# Patient Record
Sex: FEMALE | Race: WHITE | Employment: OTHER | URBAN - METROPOLITAN AREA
[De-identification: names, ages, dates, MRNs, and addresses within clinical notes are randomized per-mention and may not be internally consistent; named-entity substitution may affect disease eponyms.]

---

## 2020-08-20 ENCOUNTER — APPOINTMENT (OUTPATIENT)
Dept: GENERAL RADIOLOGY | Age: 70
DRG: 683 | End: 2020-08-20
Payer: COMMERCIAL

## 2020-08-20 ENCOUNTER — HOSPITAL ENCOUNTER (INPATIENT)
Age: 70
LOS: 4 days | Discharge: NURSING, REHAB OR LONG-TERM CARE | DRG: 683 | End: 2020-08-24
Attending: EMERGENCY MEDICINE | Admitting: INTERNAL MEDICINE
Payer: COMMERCIAL

## 2020-08-20 PROBLEM — E11.9 TYPE 2 DIABETES MELLITUS, WITH LONG-TERM CURRENT USE OF INSULIN (HCC): Chronic | Status: ACTIVE | Noted: 2020-08-20

## 2020-08-20 PROBLEM — Z79.4 TYPE 2 DIABETES MELLITUS, WITH LONG-TERM CURRENT USE OF INSULIN (HCC): Chronic | Status: ACTIVE | Noted: 2020-08-20

## 2020-08-20 PROBLEM — I48.91 ATRIAL FIBRILLATION WITH SLOW VENTRICULAR RESPONSE (HCC): Status: ACTIVE | Noted: 2020-08-20

## 2020-08-20 PROBLEM — N18.9 ACUTE KIDNEY INJURY SUPERIMPOSED ON CHRONIC KIDNEY DISEASE (HCC): Chronic | Status: ACTIVE | Noted: 2020-08-20

## 2020-08-20 PROBLEM — E03.9 ACQUIRED HYPOTHYROIDISM: Chronic | Status: ACTIVE | Noted: 2020-08-20

## 2020-08-20 PROBLEM — N18.30 STAGE 3 CHRONIC KIDNEY DISEASE (HCC): Chronic | Status: ACTIVE | Noted: 2020-08-20

## 2020-08-20 PROBLEM — N17.9 AKI (ACUTE KIDNEY INJURY) (HCC): Status: ACTIVE | Noted: 2020-08-20

## 2020-08-20 PROBLEM — N17.9 ACUTE KIDNEY INJURY SUPERIMPOSED ON CHRONIC KIDNEY DISEASE (HCC): Chronic | Status: ACTIVE | Noted: 2020-08-20

## 2020-08-20 PROBLEM — I10 ESSENTIAL HYPERTENSION: Chronic | Status: ACTIVE | Noted: 2020-08-20

## 2020-08-20 PROBLEM — F03.90 DEMENTIA (HCC): Chronic | Status: ACTIVE | Noted: 2020-08-20

## 2020-08-20 PROBLEM — K21.9 GERD (GASTROESOPHAGEAL REFLUX DISEASE): Chronic | Status: ACTIVE | Noted: 2020-08-20

## 2020-08-20 PROBLEM — E87.5 HYPERKALEMIA: Status: ACTIVE | Noted: 2020-08-20

## 2020-08-20 LAB
ALBUMIN SERPL-MCNC: 3.6 G/DL (ref 3.4–5)
ALP BLD-CCNC: 101 U/L (ref 40–129)
ALT SERPL-CCNC: 32 U/L (ref 10–40)
ANION GAP SERPL CALCULATED.3IONS-SCNC: 10 MMOL/L (ref 3–16)
AST SERPL-CCNC: 92 U/L (ref 15–37)
BACTERIA: ABNORMAL /HPF
BASOPHILS ABSOLUTE: 0 K/UL (ref 0–0.2)
BASOPHILS RELATIVE PERCENT: 0.6 %
BILIRUB SERPL-MCNC: 0.6 MG/DL (ref 0–1)
BILIRUBIN DIRECT: <0.2 MG/DL (ref 0–0.3)
BILIRUBIN URINE: ABNORMAL
BILIRUBIN, INDIRECT: ABNORMAL MG/DL (ref 0–1)
BLOOD, URINE: NEGATIVE
BUN BLDV-MCNC: 94 MG/DL (ref 7–20)
CALCIUM SERPL-MCNC: 9.8 MG/DL (ref 8.3–10.6)
CHLORIDE BLD-SCNC: 109 MMOL/L (ref 99–110)
CLARITY: CLEAR
CO2: 13 MMOL/L (ref 21–32)
COLOR: YELLOW
CREAT SERPL-MCNC: 2.6 MG/DL (ref 0.6–1.2)
EOSINOPHILS ABSOLUTE: 0.1 K/UL (ref 0–0.6)
EOSINOPHILS RELATIVE PERCENT: 1.7 %
EPITHELIAL CELLS, UA: ABNORMAL /HPF (ref 0–5)
GFR AFRICAN AMERICAN: 22
GFR NON-AFRICAN AMERICAN: 18
GLUCOSE BLD-MCNC: 161 MG/DL (ref 70–99)
GLUCOSE URINE: NEGATIVE MG/DL
HCT VFR BLD CALC: 33.4 % (ref 36–48)
HEMOGLOBIN: 10.9 G/DL (ref 12–16)
INR BLD: 1.79 (ref 0.86–1.14)
KETONES, URINE: NEGATIVE MG/DL
LEUKOCYTE ESTERASE, URINE: ABNORMAL
LYMPHOCYTES ABSOLUTE: 0.8 K/UL (ref 1–5.1)
LYMPHOCYTES RELATIVE PERCENT: 13.4 %
MAGNESIUM: 1.8 MG/DL (ref 1.8–2.4)
MCH RBC QN AUTO: 30.7 PG (ref 26–34)
MCHC RBC AUTO-ENTMCNC: 32.7 G/DL (ref 31–36)
MCV RBC AUTO: 93.8 FL (ref 80–100)
MICROSCOPIC EXAMINATION: YES
MONOCYTES ABSOLUTE: 0.5 K/UL (ref 0–1.3)
MONOCYTES RELATIVE PERCENT: 9.1 %
NEUTROPHILS ABSOLUTE: 4.4 K/UL (ref 1.7–7.7)
NEUTROPHILS RELATIVE PERCENT: 75.2 %
NITRITE, URINE: NEGATIVE
PDW BLD-RTO: 14.2 % (ref 12.4–15.4)
PH UA: 5.5 (ref 5–8)
PLATELET # BLD: 106 K/UL (ref 135–450)
PMV BLD AUTO: 8.5 FL (ref 5–10.5)
POTASSIUM REFLEX MAGNESIUM: 6.8 MMOL/L (ref 3.5–5.1)
PRO-BNP: 1620 PG/ML (ref 0–124)
PROTEIN UA: NEGATIVE MG/DL
PROTHROMBIN TIME: 20.9 SEC (ref 10–13.2)
RBC # BLD: 3.56 M/UL (ref 4–5.2)
RBC UA: ABNORMAL /HPF (ref 0–4)
S PYO AG THROAT QL: NEGATIVE
SODIUM BLD-SCNC: 132 MMOL/L (ref 136–145)
SPECIFIC GRAVITY UA: 1.02 (ref 1–1.03)
TOTAL PROTEIN: 6.7 G/DL (ref 6.4–8.2)
URINE REFLEX TO CULTURE: YES
URINE TYPE: ABNORMAL
UROBILINOGEN, URINE: 0.2 E.U./DL
WBC # BLD: 5.8 K/UL (ref 4–11)
WBC UA: ABNORMAL /HPF (ref 0–5)

## 2020-08-20 PROCEDURE — 85610 PROTHROMBIN TIME: CPT

## 2020-08-20 PROCEDURE — 87086 URINE CULTURE/COLONY COUNT: CPT

## 2020-08-20 PROCEDURE — 2500000003 HC RX 250 WO HCPCS: Performed by: NURSE PRACTITIONER

## 2020-08-20 PROCEDURE — 6370000000 HC RX 637 (ALT 250 FOR IP): Performed by: NURSE PRACTITIONER

## 2020-08-20 PROCEDURE — 36415 COLL VENOUS BLD VENIPUNCTURE: CPT

## 2020-08-20 PROCEDURE — 2580000003 HC RX 258: Performed by: NURSE PRACTITIONER

## 2020-08-20 PROCEDURE — 6360000002 HC RX W HCPCS: Performed by: INTERNAL MEDICINE

## 2020-08-20 PROCEDURE — 2500000003 HC RX 250 WO HCPCS: Performed by: INTERNAL MEDICINE

## 2020-08-20 PROCEDURE — 80076 HEPATIC FUNCTION PANEL: CPT

## 2020-08-20 PROCEDURE — 81001 URINALYSIS AUTO W/SCOPE: CPT

## 2020-08-20 PROCEDURE — 96375 TX/PRO/DX INJ NEW DRUG ADDON: CPT

## 2020-08-20 PROCEDURE — 80048 BASIC METABOLIC PNL TOTAL CA: CPT

## 2020-08-20 PROCEDURE — 99285 EMERGENCY DEPT VISIT HI MDM: CPT

## 2020-08-20 PROCEDURE — 87880 STREP A ASSAY W/OPTIC: CPT

## 2020-08-20 PROCEDURE — 71045 X-RAY EXAM CHEST 1 VIEW: CPT

## 2020-08-20 PROCEDURE — 87081 CULTURE SCREEN ONLY: CPT

## 2020-08-20 PROCEDURE — 83880 ASSAY OF NATRIURETIC PEPTIDE: CPT

## 2020-08-20 PROCEDURE — 96374 THER/PROPH/DIAG INJ IV PUSH: CPT

## 2020-08-20 PROCEDURE — 6360000002 HC RX W HCPCS: Performed by: NURSE PRACTITIONER

## 2020-08-20 PROCEDURE — 2580000003 HC RX 258: Performed by: INTERNAL MEDICINE

## 2020-08-20 PROCEDURE — 83735 ASSAY OF MAGNESIUM: CPT

## 2020-08-20 PROCEDURE — 1200000000 HC SEMI PRIVATE

## 2020-08-20 PROCEDURE — 85025 COMPLETE CBC W/AUTO DIFF WBC: CPT

## 2020-08-20 PROCEDURE — 93005 ELECTROCARDIOGRAM TRACING: CPT | Performed by: EMERGENCY MEDICINE

## 2020-08-20 RX ORDER — ATORVASTATIN CALCIUM 10 MG/1
20 TABLET, FILM COATED ORAL NIGHTLY
Status: DISCONTINUED | OUTPATIENT
Start: 2020-08-20 | End: 2020-08-24 | Stop reason: HOSPADM

## 2020-08-20 RX ORDER — PROMETHAZINE HYDROCHLORIDE 25 MG/1
12.5 TABLET ORAL EVERY 6 HOURS PRN
Status: DISCONTINUED | OUTPATIENT
Start: 2020-08-20 | End: 2020-08-24 | Stop reason: HOSPADM

## 2020-08-20 RX ORDER — ACETAMINOPHEN 650 MG/1
650 SUPPOSITORY RECTAL EVERY 6 HOURS PRN
Status: DISCONTINUED | OUTPATIENT
Start: 2020-08-20 | End: 2020-08-24 | Stop reason: HOSPADM

## 2020-08-20 RX ORDER — DEXTROSE MONOHYDRATE 50 MG/ML
100 INJECTION, SOLUTION INTRAVENOUS PRN
Status: DISCONTINUED | OUTPATIENT
Start: 2020-08-20 | End: 2020-08-24 | Stop reason: HOSPADM

## 2020-08-20 RX ORDER — SODIUM PHOSPHATE, DIBASIC AND SODIUM PHOSPHATE, MONOBASIC 7; 19 G/133ML; G/133ML
1 ENEMA RECTAL DAILY PRN
COMMUNITY

## 2020-08-20 RX ORDER — ONDANSETRON 2 MG/ML
4 INJECTION INTRAMUSCULAR; INTRAVENOUS EVERY 6 HOURS PRN
Status: DISCONTINUED | OUTPATIENT
Start: 2020-08-20 | End: 2020-08-24 | Stop reason: HOSPADM

## 2020-08-20 RX ORDER — POLYETHYLENE GLYCOL 3350 17 G/17G
17 POWDER, FOR SOLUTION ORAL DAILY PRN
COMMUNITY

## 2020-08-20 RX ORDER — DEXTROSE MONOHYDRATE 25 G/50ML
25 INJECTION, SOLUTION INTRAVENOUS ONCE
Status: COMPLETED | OUTPATIENT
Start: 2020-08-20 | End: 2020-08-20

## 2020-08-20 RX ORDER — SERTRALINE HYDROCHLORIDE 100 MG/1
100 TABLET, FILM COATED ORAL NIGHTLY
COMMUNITY

## 2020-08-20 RX ORDER — SODIUM POLYSTYRENE SULFONATE 15 G/60ML
30 SUSPENSION ORAL; RECTAL ONCE
Status: COMPLETED | OUTPATIENT
Start: 2020-08-20 | End: 2020-08-21

## 2020-08-20 RX ORDER — LEVOTHYROXINE SODIUM 0.12 MG/1
125 TABLET ORAL DAILY
Status: DISCONTINUED | OUTPATIENT
Start: 2020-08-21 | End: 2020-08-24 | Stop reason: HOSPADM

## 2020-08-20 RX ORDER — FUROSEMIDE 20 MG/1
20 TABLET ORAL 2 TIMES DAILY
Status: ON HOLD | COMMUNITY
End: 2020-12-24 | Stop reason: HOSPADM

## 2020-08-20 RX ORDER — DIPHENHYDRAMINE HCL 25 MG
25 CAPSULE ORAL EVERY 6 HOURS PRN
COMMUNITY

## 2020-08-20 RX ORDER — ATORVASTATIN CALCIUM 20 MG/1
20 TABLET, FILM COATED ORAL DAILY
COMMUNITY
End: 2022-04-20

## 2020-08-20 RX ORDER — CALCIUM CARBONATE 200(500)MG
1000 TABLET,CHEWABLE ORAL 3 TIMES DAILY PRN
Status: DISCONTINUED | OUTPATIENT
Start: 2020-08-20 | End: 2020-08-24 | Stop reason: HOSPADM

## 2020-08-20 RX ORDER — MECLIZINE HYDROCHLORIDE 25 MG/1
25 TABLET ORAL 2 TIMES DAILY
COMMUNITY
End: 2022-04-20

## 2020-08-20 RX ORDER — DULAGLUTIDE 1.5 MG/.5ML
1.5 INJECTION, SOLUTION SUBCUTANEOUS WEEKLY
COMMUNITY

## 2020-08-20 RX ORDER — FLUTICASONE PROPIONATE 50 MCG
1 SPRAY, SUSPENSION (ML) NASAL DAILY
COMMUNITY

## 2020-08-20 RX ORDER — DEXTROSE MONOHYDRATE 25 G/50ML
12.5 INJECTION, SOLUTION INTRAVENOUS PRN
Status: DISCONTINUED | OUTPATIENT
Start: 2020-08-20 | End: 2020-08-24 | Stop reason: HOSPADM

## 2020-08-20 RX ORDER — ONDANSETRON 4 MG/1
4 TABLET, FILM COATED ORAL EVERY 6 HOURS PRN
COMMUNITY

## 2020-08-20 RX ORDER — NICOTINE POLACRILEX 4 MG
15 LOZENGE BUCCAL PRN
Status: DISCONTINUED | OUTPATIENT
Start: 2020-08-20 | End: 2020-08-24 | Stop reason: HOSPADM

## 2020-08-20 RX ORDER — LEVOTHYROXINE SODIUM 0.12 MG/1
150 TABLET ORAL DAILY
COMMUNITY

## 2020-08-20 RX ORDER — ACETAMINOPHEN 325 MG/1
650 TABLET ORAL EVERY 6 HOURS PRN
Status: DISCONTINUED | OUTPATIENT
Start: 2020-08-20 | End: 2020-08-24 | Stop reason: HOSPADM

## 2020-08-20 RX ORDER — CALCIUM GLUCONATE 20 MG/ML
1 INJECTION, SOLUTION INTRAVENOUS ONCE
Status: COMPLETED | OUTPATIENT
Start: 2020-08-20 | End: 2020-08-20

## 2020-08-20 RX ORDER — AMOXICILLIN 250 MG
1 CAPSULE ORAL DAILY
COMMUNITY
End: 2022-04-20

## 2020-08-20 RX ORDER — ATROPINE SULFATE 0.1 MG/ML
0.5 INJECTION INTRAVENOUS EVERY 30 MIN PRN
Status: DISCONTINUED | OUTPATIENT
Start: 2020-08-20 | End: 2020-08-24 | Stop reason: HOSPADM

## 2020-08-20 RX ORDER — INSULIN GLARGINE 100 [IU]/ML
0.15 INJECTION, SOLUTION SUBCUTANEOUS NIGHTLY
Status: DISCONTINUED | OUTPATIENT
Start: 2020-08-20 | End: 2020-08-24 | Stop reason: HOSPADM

## 2020-08-20 RX ORDER — TIZANIDINE 4 MG/1
4 TABLET ORAL EVERY 6 HOURS PRN
Status: ON HOLD | COMMUNITY
End: 2020-12-24 | Stop reason: HOSPADM

## 2020-08-20 RX ORDER — ATROPINE SULFATE 0.1 MG/ML
0.5 INJECTION INTRAVENOUS ONCE
Status: COMPLETED | OUTPATIENT
Start: 2020-08-20 | End: 2020-08-20

## 2020-08-20 RX ORDER — INSULIN GLARGINE 100 [IU]/ML
16-24 INJECTION, SOLUTION SUBCUTANEOUS 2 TIMES DAILY
COMMUNITY

## 2020-08-20 RX ORDER — SODIUM CHLORIDE 0.9 % (FLUSH) 0.9 %
10 SYRINGE (ML) INJECTION PRN
Status: DISCONTINUED | OUTPATIENT
Start: 2020-08-20 | End: 2020-08-24 | Stop reason: HOSPADM

## 2020-08-20 RX ORDER — PANTOPRAZOLE SODIUM 40 MG/1
40 TABLET, DELAYED RELEASE ORAL
Status: DISCONTINUED | OUTPATIENT
Start: 2020-08-21 | End: 2020-08-24 | Stop reason: HOSPADM

## 2020-08-20 RX ORDER — PANTOPRAZOLE SODIUM 40 MG/1
40 TABLET, DELAYED RELEASE ORAL 2 TIMES DAILY
Status: ON HOLD | COMMUNITY
End: 2020-12-24 | Stop reason: HOSPADM

## 2020-08-20 RX ORDER — AMLODIPINE BESYLATE 10 MG/1
10 TABLET ORAL DAILY
Status: ON HOLD | COMMUNITY
End: 2020-12-24 | Stop reason: HOSPADM

## 2020-08-20 RX ORDER — SODIUM CHLORIDE 9 MG/ML
INJECTION, SOLUTION INTRAVENOUS CONTINUOUS
Status: DISCONTINUED | OUTPATIENT
Start: 2020-08-20 | End: 2020-08-21

## 2020-08-20 RX ORDER — AMLODIPINE BESYLATE 5 MG/1
10 TABLET ORAL DAILY
Status: DISCONTINUED | OUTPATIENT
Start: 2020-08-21 | End: 2020-08-21

## 2020-08-20 RX ADMIN — SODIUM BICARBONATE 25 MEQ: 84 INJECTION INTRAVENOUS at 18:15

## 2020-08-20 RX ADMIN — CALCIUM GLUCONATE 3 G: 98 INJECTION, SOLUTION INTRAVENOUS at 20:57

## 2020-08-20 RX ADMIN — ATROPINE SULFATE 0.5 MG: 0.1 INJECTION PARENTERAL at 17:57

## 2020-08-20 RX ADMIN — CALCIUM GLUCONATE 1 G: 20 INJECTION, SOLUTION INTRAVENOUS at 18:15

## 2020-08-20 RX ADMIN — INSULIN HUMAN 10 UNITS: 100 INJECTION, SOLUTION PARENTERAL at 18:16

## 2020-08-20 RX ADMIN — SODIUM BICARBONATE 50 MEQ: 84 INJECTION INTRAVENOUS at 20:03

## 2020-08-20 RX ADMIN — SODIUM CHLORIDE: 9 INJECTION, SOLUTION INTRAVENOUS at 19:19

## 2020-08-20 RX ADMIN — DEXTROSE MONOHYDRATE 25 G: 25 INJECTION, SOLUTION INTRAVENOUS at 18:15

## 2020-08-20 SDOH — HEALTH STABILITY: MENTAL HEALTH: HOW OFTEN DO YOU HAVE A DRINK CONTAINING ALCOHOL?: NEVER

## 2020-08-20 NOTE — ED PROVIDER NOTES
I independently performed a [facility] All diagnostic, treatment, and disposition decisions were made by myself in conjunction with the advanced practice provider.     [facility] is a 77 y.o. female presents to ED for abnormal labs. Patient denies any symptoms including cp, sob. Reports lightheadedness/dizziness for 2-3 days, states it randomly comes on and off, currently not having symptoms. -PE: well appearing, nontoxic, not in acute distress. bradycardia, CTAB/l, no w/r/r, abdomen soft, ND, NTTP, + BS x 4, no rigidity, rebound, guarding  -lab workup significant for: Hyponatremia of 132, hyperkalemia 6.8, BUN of 94 with DIONNE and creatinine of 2.6.  proBNP is 1620, negative leukocytosis, H&H stable at 10.9/33.4.  -CT a/p:  -Ua: Moderate leukocyte esterase, negative nitrite,  WBC, 0-2 RBC, 11-20 epithelial cells  -The Ekg interpreted by me shows  sinus bradycardia, rate=38   Axis is   Normal  QTc is  407  Intervals and Durations are unremarkable. ST Segments: no acute change  No prior ekg for comparison  -Patient was given atropine for bradycardia which improved it from the 30s to the 50s. Patient started on calcium, dextrose, insulin, sodium bicarb and Kayexalate for hyperkalemia.  -Plan for admission for further workup and treatment for abnormal labs discussed with patient who is in agreement with plan and have no further questions/concerns  Total Critical Care time was 20 minutes, excluding separately reportableprocedures. There was a high probability of clinicallysignificant/life threatening deterioration in the patient's condition which required my urgent intervention. For further details of [facility] emergency department encounter, please see DEBBY Shin documentation.         Lizz Byrd MD  08/20/20 8666

## 2020-08-20 NOTE — ED TRIAGE NOTES
Pt was sent in for abnormal labs. K+ was 6.9 at nursing home. ECG nurse states patient was having a cough and sore throat. PT states she did but a long time ago.

## 2020-08-20 NOTE — ED NOTES
Bed: 16  Expected date:   Expected time:   Means of arrival:   Comments:  [facility] [id number] Carrillo RN  08/20/20 5804

## 2020-08-20 NOTE — ED PROVIDER NOTES
Evaluated by [id number] [facility] Provider    [facility]  ED    CHIEF COMPLAINT  Abnormal Lab (Pt sent in for high Potassium)    HISTORY OF PRESENT ILLNESS  Joshua Bautista is a 77 y.o. female who presents to the ED complaining of abnormal lab. Report from [address] [place] [address] sent in due to potassium was 6.9 today, cipro to be started for cough and sore throat. She takes lasix but not potassium. She is very confused but alert. Forgetful. Has slow steady gait w/asst X1. Regular diet. H/O a fib. Patient states she felt her heart rate was slow the other day. Patient states cough and sore throat a while ago. She did endorse a sore throat to me today. Patient found to have a heart rate in the high 30s to low 40s. The patient arrived to the ED via EMS transport. PAST MEDICAL HISTORY    Past Medical History:   Diagnosis Date    Anxiety     Arthritis     Atrial fibrillation ([facility]     CAD (coronary artery disease)     CHF (congestive heart failure) (HCC)     Chronic pain syndrome     Cirrhosis ([facility] [facility]     Depression     Diabetes mellitus (HCC)     GERD (gastroesophageal reflux disease)     Hyperlipidemia     Hypertension     Kidney disease     Thyroid disease     Vitamin D deficiency        SURGICAL HISTORY    History reviewed. No pertinent surgical history.     CURRENT MEDICATIONS    Current Outpatient Rx   Medication Sig Dispense Refill    acetaminophen (TYLENOL) 325 MG suppository Place 650 mg rectally every 4 hours as needed for Fever      apixaban (ELIQUIS) 5 MG TABS tablet Take by mouth 2 times daily      atorvastatin (LIPITOR) 20 MG tablet Take 20 mg by mouth daily      diphenhydrAMINE (BENADRYL) 25 MG capsule Take 25 mg by mouth every 6 hours as needed for Itching      vitamin D (CHOLECALCIFEROL) 125 MCG (5000 UT) CAPS capsule Take 50,000 Units by mouth daily      fluticasone (FLONASE) 50 MCG/ACT nasal spray 1 spray by Each Nostril route daily      furosemide (LASIX) 20 MG tablet Take 20 mg by mouth 2 times daily      polyethylene glycol (MIRALAX) 17 g packet Take 17 g by mouth daily as needed for Constipation      insulin glargine (LANTUS) 100 UNIT/ML injection vial Inject into the skin nightly 24 units in am 15 units at bedtime      levothyroxine (SYNTHROID) 125 MCG tablet Take 125 mcg by mouth Daily      meclizine (ANTIVERT) 25 MG tablet Take 25 mg by mouth 3 times daily as needed      amLODIPine (NORVASC) 10 MG tablet Take 10 mg by mouth daily      insulin aspart (NOVOLOG) 100 UNIT/ML injection cartridge Inject into the skin 3 times daily (before meals) Sliding scale      ondansetron (ZOFRAN) 4 MG tablet Take 4 mg by mouth every 8 hours as needed for Nausea or Vomiting      pantoprazole (PROTONIX) 40 MG tablet Take 40 mg by mouth daily      senna-docusate (PERICOLACE) 8.6-50 MG per tablet Take 1 tablet by mouth daily      tiZANidine (ZANAFLEX) 4 MG tablet Take 4 mg by mouth every 6 hours as needed      Dulaglutide (TRULICITY) 1.5 CA/0.3AR SOPN Inject 1.5 mg into the skin once a week      sertraline (ZOLOFT) 100 MG tablet Take 125 mg by mouth nightly      Sodium Phosphates (FLEET) 7-19 GM/118ML Place 1 enema rectally once as needed      glycerin, pediatric, 1 g SUPP Place 1 suppository rectally once      magnesium hydroxide (MILK OF MAGNESIA CONCENTRATE) 2400 MG/10ML SUSP Take 2,400 mg by mouth once as needed         ALLERGIES    Allergies   Allergen Reactions    Ceclor [Cefaclor]      Sore throat    Lisinopril Other (See Comments)     cough       FAMILY HISTORY    History reviewed. No pertinent family history. SOCIAL HISTORY    Social History     Socioeconomic History    Marital status:       Spouse name: None    Number of children: None    Years of education: None    Highest education level: None   Occupational History    None   Social Needs    Financial resource strain: None    Food insecurity     Worry: None     Inability: None    Transportation needs     Medical: None     Non-medical: None   Tobacco Use    Smoking status: Never Smoker    Smokeless tobacco: Never Used   Substance and Sexual Activity    Alcohol use: Never     Frequency: Never    Drug use: Never    Sexual activity: None   Lifestyle    Physical activity     Days per week: None     Minutes per session: None    Stress: None   Relationships    Social connections     Talks on phone: None     Gets together: None     Attends Sikhism service: None     Active member of club or organization: None     Attends meetings of clubs or organizations: None     Relationship status: None    Intimate partner violence     Fear of current or ex partner: None     Emotionally abused: None     Physically abused: None     Forced sexual activity: None   Other Topics Concern    None   Social History Narrative    None       REVIEW OF SYSTEMS    10 systems reviewed, pertinent positives per HPI otherwise noted to be negative    PHYSICAL EXAM  Vitals:    08/20/20 1846   BP: (!) 123/40   Pulse: (!) 49   Resp: 17   Temp:    SpO2: 91%     GENERAL: Patient is well-developed, morbidly obese. Awake and alert. Cooperative. Resting in bed. No apparent distress. HEENT:  Normocephalic, atraumatic. Conjunctiva appear normal. Sclera is non-icteric. External ears are normal.    NECK: Supple with normal ROM. Trachea midline. LUNGS: Equal and symmetric chest rise. Breathing is unlabored. Speaking comfortably in full sentences. Lungs are clear bilaterally to auscultation. Without wheezing, rales, or rhonchi. CADIOVASCULAR: Bradycardic rate and rhythm. Normal S1-S2 sounds. No murmurs, rubs, or gallops. Capillary refill is brisk in all 4 extremities. Bilateral lower extremities are equal in size, there is no swelling observed. There is no tenderness to palpation. There is no erythema observed or warmth palpated. GI: Soft, nontender, nondistended with positive bowel sounds.   No rebound tenderness, guarding or any peritoneal signs. No masses or hepatosplenomegaly    MUSCULOSKELETAL:  No gross deformities or trauma noted. Moving all extremities equally and appropriately. Normal ROM. SKIN: Warm/dry. Skin is intact. No rashes/lesions noted. PSYCHIATRIC: Mood and affect appropriate. Speech is clear and articulate. NEUROLOGIC: Alert and oriented to person, place and time however she did have several flippant responses. For example when I asked her name she asked what it was worth to me and when I asked the year she said it was the year to conquer the beast. I asked her to clarify what the beast was and she said the one in the [place]. No focal motor or sensory deficits. LABS  I have reviewed all labs for this visit.    Results for orders placed or performed during the hospital encounter of 08/20/20   CBC auto differential   Result Value Ref Range    WBC 5.8 4.0 - 11.0 K/uL    RBC 3.56 (L) 4.00 - 5.20 M/uL    Hemoglobin 10.9 (L) 12.0 - 16.0 g/dL    Hematocrit 33.4 (L) 36.0 - 48.0 %    MCV 93.8 80.0 - 100.0 fL    MCH 30.7 26.0 - 34.0 pg    MCHC 32.7 31.0 - 36.0 g/dL    RDW 14.2 12.4 - 15.4 %    Platelets 916 (L) 628 - 450 K/uL    MPV 8.5 5.0 - 10.5 fL    Neutrophils % 75.2 %    Lymphocytes % 13.4 %    Monocytes % 9.1 %    Eosinophils % 1.7 %    Basophils % 0.6 %    Neutrophils Absolute 4.4 1.7 - 7.7 K/uL    Lymphocytes Absolute 0.8 (L) 1.0 - 5.1 K/uL    Monocytes Absolute 0.5 0.0 - 1.3 K/uL    Eosinophils Absolute 0.1 0.0 - 0.6 K/uL    Basophils Absolute 0.0 0.0 - 0.2 K/uL   Basic Metabolic Panel w/ Reflex to MG   Result Value Ref Range    Sodium 132 (L) 136 - 145 mmol/L    Potassium reflex Magnesium 6.8 (HH) 3.5 - 5.1 mmol/L    Chloride 109 99 - 110 mmol/L    CO2 13 (LL) 21 - 32 mmol/L    Anion Gap 10 3 - 16    Glucose 161 (H) 70 - 99 mg/dL    BUN 94 (HH) 7 - 20 mg/dL    CREATININE 2.6 (H) 0.6 - 1.2 mg/dL    GFR Non-African American 18 (A) >60    GFR  22 (A) >60    Calcium 9.8 8.3 - 10.6 mg/dL Protime-INR   Result Value Ref Range    Protime 20.9 (H) 10.0 - 13.2 sec    INR 1.79 (H) 0.86 - 1.14   Magnesium   Result Value Ref Range    Magnesium 1.80 1.80 - 2.40 mg/dL   Brain Natriuretic Peptide   Result Value Ref Range    Pro-BNP 1,620 (H) 0 - 124 pg/mL   EKG 12 Lead   Result Value Ref Range    Ventricular Rate 38 BPM    Atrial Rate 36 BPM    QRS Duration 100 ms    Q-T Interval 512 ms    QTc Calculation (Bazett) 407 ms    R Axis 89 degrees    T Axis 53 degrees    Diagnosis       Junctional bradycardiaAbnormal ECGNo previous ECGs available       RADIOLOGY    Xr Chest Portable    Result Date: 8/20/2020  EXAMINATION: ONE XRAY VIEW OF THE CHEST 8/20/2020 5:49 pm COMPARISON: None. HISTORY: ORDERING SYSTEM PROVIDED HISTORY: low HR TECHNOLOGIST PROVIDED HISTORY: Reason for exam:->low HR Reason for Exam: low HR Acuity: Acute Type of Exam: Initial FINDINGS: Patchy faint bilateral opacities are present within both lungs. No evidence of pneumothorax or pleural effusion. Sternotomy wires and mediastinal clips are noted. Bilateral pulmonary opacities favored to be due to mild edema. Atypical pneumonia also in the differential.     REVIEW OF RECORDS  Review of records from her nursing facility shows that she had a comprehensive metabolic panel done today, BUN elevated at 91, creatinine elevated at 2.6. Potassium elevated at 6.9. On 6/25 BUN 40 and creatinine 1.1. At that time her potassium was 5.4. She does not appear to be on rate control medication but she is on Apixaban. Most recent blood pressure from 8/20 at approximately 6:15 AM showed it to be 148/48. Most recent heart rate on 8/1 3 was 70 and regular. ED COURSE/MDM  Patient seen and evaluated. Old records reviewed. Diagnostic testing reviewed and results discussed.       I have seen and evaluated this patient with supervising physician: Mohini Babin MD. We thoroughly discussed the history, physical exam, diagnostic testing, emergency department sodium bicarbonate 8.4 % injection 25 mEq (25 mEq Intravenous Given 8/20/20 1815)     Do feel patient will require admission for management of her multiple medical conditions. I spoke with Dr. Kodak Steve. We thoroughly discussed the history, physical exam, laboratory and imaging studies, as well as, emergency department course. Based upon that discussion, we've decided to admit Court Gins for further observation and evaluation of Ginger Camejo's abdominal pain. As I have deemed necessary from their history, physical and studies, I have considered and evaluated Court Gins for the following diagnoses:  ARHYTHMIA, INFECTION, ELECTROLYTE ABNORMALITY, KIDNEY DYSFUNCTION, PNA, CHF    The total critical care time spent while evaluating and treating this patient was at least 53 minutes. This excludes time spent doing separately billable procedures. This includes time at the bedside, data interpretation, medication management, obtaining critical history from collateral sources if the patient is unable to provide it directly, and physician consultation. Specifics of interventions taken and potentially life-threatening diagnostic considerations are listed above in the medical decision making. CLINICAL IMPRESSION    1. Hyperkalemia    2. Bradycardia    3. DIONNE (acute kidney injury) ([address]    4. Chronic anticoagulation    5. Longstanding persistent atrial fibrillation       DISPOSITION  Admitted. Comment: Please note this report has been produced using speech recognition software and may contain errors related to that system including errors in grammar, punctuation, and spelling, as well as words and phrases that may be inappropriate. If there are any questions or concerns please feel free to contact the dictating provider for clarification.         DANIELLE Au - COREY  08/21/20 9750

## 2020-08-20 NOTE — ED NOTES
[facility] @ [facility]   Re:  high K+, DIONNE, bradycardic  Dr. Lamine Gibbons @ [address] [facility]  08/20/20 1936

## 2020-08-21 LAB
ALBUMIN SERPL-MCNC: 3.9 G/DL (ref 3.4–5)
ANION GAP SERPL CALCULATED.3IONS-SCNC: 14 MMOL/L (ref 3–16)
ANION GAP SERPL CALCULATED.3IONS-SCNC: 14 MMOL/L (ref 3–16)
BASE EXCESS VENOUS: -10.4 MMOL/L (ref -3–3)
BASOPHILS ABSOLUTE: 0 K/UL (ref 0–0.2)
BASOPHILS RELATIVE PERCENT: 0.3 %
BUN BLDV-MCNC: 68 MG/DL (ref 7–20)
BUN BLDV-MCNC: 80 MG/DL (ref 7–20)
CALCIUM SERPL-MCNC: 10.2 MG/DL (ref 8.3–10.6)
CALCIUM SERPL-MCNC: 10.7 MG/DL (ref 8.3–10.6)
CARBOXYHEMOGLOBIN: 1.4 % (ref 0–1.5)
CHLORIDE BLD-SCNC: 113 MMOL/L (ref 99–110)
CHLORIDE BLD-SCNC: 113 MMOL/L (ref 99–110)
CO2: 14 MMOL/L (ref 21–32)
CO2: 14 MMOL/L (ref 21–32)
CREAT SERPL-MCNC: 1.3 MG/DL (ref 0.6–1.2)
CREAT SERPL-MCNC: 1.7 MG/DL (ref 0.6–1.2)
EKG ATRIAL RATE: 36 BPM
EKG DIAGNOSIS: NORMAL
EKG Q-T INTERVAL: 512 MS
EKG QRS DURATION: 100 MS
EKG QTC CALCULATION (BAZETT): 407 MS
EKG R AXIS: 89 DEGREES
EKG T AXIS: 53 DEGREES
EKG VENTRICULAR RATE: 38 BPM
EOSINOPHILS ABSOLUTE: 0.1 K/UL (ref 0–0.6)
EOSINOPHILS RELATIVE PERCENT: 1.3 %
ESTIMATED AVERAGE GLUCOSE: 177.2 MG/DL
GFR AFRICAN AMERICAN: 36
GFR AFRICAN AMERICAN: 49
GFR NON-AFRICAN AMERICAN: 30
GFR NON-AFRICAN AMERICAN: 41
GLUCOSE BLD-MCNC: 111 MG/DL (ref 70–99)
GLUCOSE BLD-MCNC: 113 MG/DL (ref 70–99)
GLUCOSE BLD-MCNC: 122 MG/DL (ref 70–99)
GLUCOSE BLD-MCNC: 129 MG/DL (ref 70–99)
GLUCOSE BLD-MCNC: 172 MG/DL (ref 70–99)
GLUCOSE BLD-MCNC: 201 MG/DL (ref 70–99)
HBA1C MFR BLD: 7.8 %
HCO3 VENOUS: 15.6 MMOL/L (ref 23–29)
HCT VFR BLD CALC: 34.6 % (ref 36–48)
HEMOGLOBIN: 11.6 G/DL (ref 12–16)
LYMPHOCYTES ABSOLUTE: 0.8 K/UL (ref 1–5.1)
LYMPHOCYTES RELATIVE PERCENT: 13.3 %
MAGNESIUM: 1.7 MG/DL (ref 1.8–2.4)
MCH RBC QN AUTO: 31.3 PG (ref 26–34)
MCHC RBC AUTO-ENTMCNC: 33.4 G/DL (ref 31–36)
MCV RBC AUTO: 93.6 FL (ref 80–100)
METHEMOGLOBIN VENOUS: 0.7 %
MONOCYTES ABSOLUTE: 0.4 K/UL (ref 0–1.3)
MONOCYTES RELATIVE PERCENT: 6.7 %
NEUTROPHILS ABSOLUTE: 4.7 K/UL (ref 1.7–7.7)
NEUTROPHILS RELATIVE PERCENT: 78.4 %
O2 CONTENT, VEN: 14 VOL %
O2 SAT, VEN: 89 %
O2 THERAPY: ABNORMAL
PCO2, VEN: 35 MMHG (ref 40–50)
PDW BLD-RTO: 14.1 % (ref 12.4–15.4)
PERFORMED ON: ABNORMAL
PH VENOUS: 7.27 (ref 7.35–7.45)
PHOSPHORUS: 3 MG/DL (ref 2.5–4.9)
PLATELET # BLD: 104 K/UL (ref 135–450)
PMV BLD AUTO: 8 FL (ref 5–10.5)
PO2, VEN: 58.5 MMHG (ref 25–40)
POTASSIUM SERPL-SCNC: 5.3 MMOL/L (ref 3.5–5.1)
POTASSIUM SERPL-SCNC: 5.8 MMOL/L (ref 3.5–5.1)
RBC # BLD: 3.7 M/UL (ref 4–5.2)
SARS-COV-2, NAA: NOT DETECTED
SODIUM BLD-SCNC: 141 MMOL/L (ref 136–145)
SODIUM BLD-SCNC: 141 MMOL/L (ref 136–145)
TCO2 CALC VENOUS: 17 MMOL/L
URINE CULTURE, ROUTINE: NORMAL
WBC # BLD: 6 K/UL (ref 4–11)

## 2020-08-21 PROCEDURE — 83036 HEMOGLOBIN GLYCOSYLATED A1C: CPT

## 2020-08-21 PROCEDURE — 2580000003 HC RX 258: Performed by: INTERNAL MEDICINE

## 2020-08-21 PROCEDURE — U0003 INFECTIOUS AGENT DETECTION BY NUCLEIC ACID (DNA OR RNA); SEVERE ACUTE RESPIRATORY SYNDROME CORONAVIRUS 2 (SARS-COV-2) (CORONAVIRUS DISEASE [COVID-19]), AMPLIFIED PROBE TECHNIQUE, MAKING USE OF HIGH THROUGHPUT TECHNOLOGIES AS DESCRIBED BY CMS-2020-01-R: HCPCS

## 2020-08-21 PROCEDURE — 85025 COMPLETE CBC W/AUTO DIFF WBC: CPT

## 2020-08-21 PROCEDURE — 80069 RENAL FUNCTION PANEL: CPT

## 2020-08-21 PROCEDURE — 6360000002 HC RX W HCPCS: Performed by: INTERNAL MEDICINE

## 2020-08-21 PROCEDURE — 6370000000 HC RX 637 (ALT 250 FOR IP): Performed by: INTERNAL MEDICINE

## 2020-08-21 PROCEDURE — 2500000003 HC RX 250 WO HCPCS: Performed by: INTERNAL MEDICINE

## 2020-08-21 PROCEDURE — 82803 BLOOD GASES ANY COMBINATION: CPT

## 2020-08-21 PROCEDURE — 83735 ASSAY OF MAGNESIUM: CPT

## 2020-08-21 PROCEDURE — 1200000000 HC SEMI PRIVATE

## 2020-08-21 PROCEDURE — 6370000000 HC RX 637 (ALT 250 FOR IP): Performed by: NURSE PRACTITIONER

## 2020-08-21 RX ORDER — SODIUM POLYSTYRENE SULFONATE 15 G/60ML
30 SUSPENSION ORAL; RECTAL ONCE
Status: COMPLETED | OUTPATIENT
Start: 2020-08-21 | End: 2020-08-21

## 2020-08-21 RX ORDER — 0.9 % SODIUM CHLORIDE 0.9 %
1000 INTRAVENOUS SOLUTION INTRAVENOUS ONCE
Status: COMPLETED | OUTPATIENT
Start: 2020-08-21 | End: 2020-08-21

## 2020-08-21 RX ORDER — DIAZEPAM 5 MG/1
5 TABLET ORAL ONCE
Status: COMPLETED | OUTPATIENT
Start: 2020-08-21 | End: 2020-08-21

## 2020-08-21 RX ADMIN — INSULIN LISPRO 1 UNITS: 100 INJECTION, SOLUTION INTRAVENOUS; SUBCUTANEOUS at 21:21

## 2020-08-21 RX ADMIN — ATORVASTATIN CALCIUM 20 MG: 10 TABLET, FILM COATED ORAL at 21:20

## 2020-08-21 RX ADMIN — INSULIN GLARGINE 15 UNITS: 100 INJECTION, SOLUTION SUBCUTANEOUS at 21:20

## 2020-08-21 RX ADMIN — INSULIN GLARGINE 15 UNITS: 100 INJECTION, SOLUTION SUBCUTANEOUS at 00:37

## 2020-08-21 RX ADMIN — SODIUM CHLORIDE: 9 INJECTION, SOLUTION INTRAVENOUS at 10:40

## 2020-08-21 RX ADMIN — SODIUM BICARBONATE: 84 INJECTION, SOLUTION INTRAVENOUS at 14:32

## 2020-08-21 RX ADMIN — ATORVASTATIN CALCIUM 20 MG: 10 TABLET, FILM COATED ORAL at 00:00

## 2020-08-21 RX ADMIN — APIXABAN 2.5 MG: 2.5 TABLET, FILM COATED ORAL at 11:03

## 2020-08-21 RX ADMIN — APIXABAN 2.5 MG: 2.5 TABLET, FILM COATED ORAL at 21:20

## 2020-08-21 RX ADMIN — SERTRALINE HYDROCHLORIDE 125 MG: 50 TABLET, FILM COATED ORAL at 00:00

## 2020-08-21 RX ADMIN — SODIUM POLYSTYRENE SULFONATE 30 G: 15 SUSPENSION ORAL; RECTAL at 06:14

## 2020-08-21 RX ADMIN — DIAZEPAM 5 MG: 5 TABLET ORAL at 21:19

## 2020-08-21 RX ADMIN — SODIUM CHLORIDE 1000 ML: 9 INJECTION, SOLUTION INTRAVENOUS at 06:14

## 2020-08-21 RX ADMIN — PANTOPRAZOLE SODIUM 40 MG: 40 TABLET, DELAYED RELEASE ORAL at 06:14

## 2020-08-21 RX ADMIN — SODIUM POLYSTYRENE SULFONATE 30 G: 15 SUSPENSION ORAL; RECTAL at 00:00

## 2020-08-21 RX ADMIN — Medication 2 MG: at 21:20

## 2020-08-21 RX ADMIN — SERTRALINE HYDROCHLORIDE 125 MG: 50 TABLET, FILM COATED ORAL at 21:19

## 2020-08-21 RX ADMIN — CEFTRIAXONE SODIUM 1 G: 1 INJECTION, POWDER, FOR SOLUTION INTRAMUSCULAR; INTRAVENOUS at 06:14

## 2020-08-21 RX ADMIN — LEVOTHYROXINE SODIUM 125 MCG: 0.12 TABLET ORAL at 06:14

## 2020-08-21 ASSESSMENT — PAIN DESCRIPTION - DESCRIPTORS: DESCRIPTORS: SORE

## 2020-08-21 ASSESSMENT — PAIN DESCRIPTION - LOCATION: LOCATION: GENERALIZED

## 2020-08-21 ASSESSMENT — PAIN SCALES - GENERAL
PAINLEVEL_OUTOF10: 0
PAINLEVEL_OUTOF10: 3
PAINLEVEL_OUTOF10: 0

## 2020-08-21 ASSESSMENT — PAIN DESCRIPTION - PAIN TYPE: TYPE: ACUTE PAIN

## 2020-08-21 NOTE — DISCHARGE INSTR - COC
Continuity of Care Form    Patient Name: Dalia Haney   :  1950  MRN:  [id number]    Admit date:  2020  Discharge date:  2020    Code Status Order: Full Code   Advance Directives:     Admitting Physician:  Sandro Gonzalez MD  PCP: Isis Hurd MD    Discharging Nurse: Izzy BENNETT [facility] Unit/Room#: 5547/9990-37  Discharging Unit Phone Number: [phone]    Emergency Contact:   Extended Emergency Contact Information  Primary Emergency Contact: [facility] Phone: [phone]  Relation: Other  Secondary Emergency Contact: Monster Baker  [place] Phone: [phone]  Relation: Other    Past Surgical History:  History reviewed. No pertinent surgical history. Immunization History: There is no immunization history on file for this patient. Active Problems:  Patient Active Problem List   Diagnosis Code    Hyperkalemia E87.5    Acute kidney injury superimposed on chronic kidney disease ([facility] [address] N17.9, N18.9    Stage 3 chronic kidney disease ([facility] [address] N18.3    Atrial fibrillation with slow ventricular response ([facility]) I48.91    DIONNE (acute kidney injury) ([facility] [address] N17.9    Type 2 diabetes mellitus, with long-term current use of insulin ([facility]) E11.9, Z79.4    Essential hypertension I10    GERD (gastroesophageal reflux disease) K21.9    Dementia ([facility] [address] F03.90    Acquired hypothyroidism E03.9       Isolation/Infection:   Isolation          Droplet Plus        Unreconciled External Infections     Infection Onset Last Indicated Last Received Source    No mapped external infections found    .     Unmapped Infections (1)      ESBL organism 19             Patient Infection Status     Infection Onset Added Last Indicated Last Indicated By Review Planned Expiration Resolved Resolved By    COVID-19 Rule Out 20 COVID-19 (Ordered) 20            Nurse Assessment:  Last Vital Signs: BP (!) 157/41   Pulse 53   Temp 97.7 °F (36.5 °C) (Oral)   Resp 20   Ht 5' 5\" (1.651 m)   Wt 225 lb (102.1 kg)   SpO2 98%   BMI 37.44 kg/m²     Last documented pain score (0-10 scale):    Last Weight:   Wt Readings from Last 1 Encounters:   08/20/20 225 lb (102.1 kg)     Mental Status:  oriented, alert and coherent    IV Access:  - None    Nursing Mobility/ADLs:  Walking   Assisted  Transfer  Independent  Bathing  Independent  Dressing  Independent  [address]  Independent  Med Delivery   whole    Wound Care Documentation and Therapy:        Elimination:  Continence:   · Bowel: Yes  · Bladder: Yes  Urinary Catheter: None   Colostomy/Ileostomy/Ileal Conduit: No       Date of Last BM: 08/21/2020  No intake or output data in the 24 hours ending 08/21/20 1010  No intake/output data recorded. Safety Concerns: At Risk for Falls    Impairments/Disabilities:      Vision    Nutrition Therapy:  Current Nutrition Therapy:   - Oral Diet:  carb control    Routes of Feeding: Oral  Liquids: No Restrictions  Daily Fluid Restriction: no  Last Modified Barium Swallow with Video (Video Swallowing Test): not done    Treatments at the Time of Hospital Discharge:   Respiratory Treatments: Na  Oxygen Therapy:  is not on home oxygen therapy.   Ventilator:    - No ventilator support    Rehab Therapies: Physical Therapy and Occupational Therapy  Weight Bearing Status/Restrictions: No weight bearing restirctions  Other Medical Equipment (for information only, NOT a DME order):  walker  Other Treatments: na    Patient's personal belongings (please select all that are sent with patient):  Glasses    RN SIGNATURE:  Electronically signed by Jemma Mccrary RN on 8/24/20 at 2:56 PM EDT    CASE MANAGEMENT/SOCIAL WORK SECTION    Inpatient Status Date: 8/20/20    Readmission Risk Assessment Score:  Readmission Risk              Risk of Unplanned Readmission:        22           Discharging to Facility/ Agency   · Name: [facility]  · Address:  · Phone: [phone]  · BDV:[id number]    Dialysis Facility (if applicable)   · Name:  · Address:  · Dialysis Schedule:  · Phone:  · Fax:    / signature: Electronically signed by Gisselle Gr RN on 8/24/20 at 10:47 AM EDT    PHYSICIAN SECTION    Prognosis: Good    Condition at Discharge: Stable    Rehab Potential (if transferring to Rehab): Good    Recommended Labs or Other Treatments After Discharge: PT/OT  For Kidney, Blood pressure edema, and electrolyte Problems :  Please get Renal panel  done in about a week and fax to [phone], Dr Eneida Murphy, nephrologist at [facility] - also fax to Primary care Physician, and other specialties if appropriate. Please call phone no. [phone] with above mentioned issues. Physician Certification: I certify the above information and transfer of Bandar Jensen  is necessary for the continuing treatment of the diagnosis listed and that she requires ECF for greater 30 days.      Update Admission H&P: No change in H&P    PHYSICIAN SIGNATURE:  Electronically signed by DANIELLE Pitt CNP on 8/24/20 at 9:50 AM EDT

## 2020-08-21 NOTE — H&P
Hospital Medicine History & Physical      Patient:  Marisa Rubin  :   1950  MRN:   [id number]  Date of Service: 20    CHIEF COMPLAINT:  Abnormal labs    HISTORY OF PRESENT ILLNESS:    Marisa Rubin is a 77 y.o. female. She was sent from her [facility] where she resides d/t dementia for abnormal labs. Lab work was obtained because the patient had complained of a sore throat and was about to start an antibiotic, ciprofloxacin. Labs of concern included an elevated potassium and creatinine compared to her baseline. To me patient does relate she has a sore throat and discomfort in both ears, left > right. She can't tell me how long this has been going on. She denies odynophagia and dysphagia. She does not think she has had a cough, dyspnea, wheezing, pleuritic pain, or hemoptysis. She relates her appetite is \"not great\" but denies recent rigors, sweats, nausea, and diarrhea. Review of Systems:  All pertinent positives and negatives are as noted in the HPI section. All other systems were reviewed and are negative. Past Medical History:   Diagnosis Date    Anxiety     Arthritis     Atrial fibrillation ([address]     CAD (coronary artery disease)     CHF (congestive heart failure) (HCC)     Chronic pain syndrome     [facility]     Depression     Diabetes mellitus ([facility]     GERD (gastroesophageal reflux disease)     Hyperlipidemia     Hypertension     Kidney disease     Thyroid disease     Vitamin D deficiency        History reviewed. No pertinent surgical history. Prior to Admission medications    Medication Sig Start Date End Date Taking?  Authorizing Provider   acetaminophen (TYLENOL) 325 MG suppository Place 650 mg rectally every 4 hours as needed for Fever   Yes Historical Provider, MD   apixaban (ELIQUIS) 5 MG TABS tablet Take by mouth 2 times daily   Yes Historical Provider, MD   atorvastatin (LIPITOR) 20 MG tablet Take 20 mg by mouth daily   Yes Historical Provider, MD diphenhydrAMINE (BENADRYL) 25 MG capsule Take 25 mg by mouth every 6 hours as needed for Itching   Yes Historical Provider, MD   vitamin D (CHOLECALCIFEROL) 125 MCG (5000 UT) CAPS capsule Take 50,000 Units by mouth daily   Yes Historical Provider, MD   fluticasone (FLONASE) 50 MCG/ACT nasal spray 1 spray by Each Nostril route daily   Yes Historical Provider, MD   furosemide (LASIX) 20 MG tablet Take 20 mg by mouth 2 times daily   Yes Historical Provider, MD   polyethylene glycol (MIRALAX) 17 g packet Take 17 g by mouth daily as needed for Constipation   Yes Historical Provider, MD   insulin glargine (LANTUS) 100 UNIT/ML injection vial Inject into the skin nightly 24 units in am 15 units at bedtime   Yes Historical Provider, MD   levothyroxine (SYNTHROID) 125 MCG tablet Take 125 mcg by mouth Daily   Yes Historical Provider, MD   meclizine (ANTIVERT) 25 MG tablet Take 25 mg by mouth 3 times daily as needed   Yes Historical Provider, MD   amLODIPine (NORVASC) 10 MG tablet Take 10 mg by mouth daily   Yes Historical Provider, MD   insulin aspart (NOVOLOG) 100 UNIT/ML injection cartridge Inject into the skin 3 times daily (before meals) Sliding scale   Yes Historical Provider, MD   ondansetron (ZOFRAN) 4 MG tablet Take 4 mg by mouth every 8 hours as needed for Nausea or Vomiting   Yes Historical Provider, MD   pantoprazole (PROTONIX) 40 MG tablet Take 40 mg by mouth daily   Yes Historical Provider, MD   senna-docusate (Doneen Patient) 8.6-50 MG per tablet Take 1 tablet by mouth daily   Yes Historical Provider, MD   tiZANidine (ZANAFLEX) 4 MG tablet Take 4 mg by mouth every 6 hours as needed   Yes Historical Provider, MD   Dulaglutide (TRULICITY) 1.5 BW/7.7BO SOPN Inject 1.5 mg into the skin once a week   Yes Historical Provider, MD   sertraline (ZOLOFT) 100 MG tablet Take 125 mg by mouth nightly   Yes Historical Provider, MD   Sodium Phosphates (FLEET) 7-19 GM/118ML Place 1 enema rectally once as needed    Historical Provider, MD   glycerin, pediatric, 1 g SUPP Place 1 suppository rectally once    Historical Provider, MD   magnesium hydroxide (MILK OF MAGNESIA CONCENTRATE) 2400 MG/10ML SUSP Take 2,400 mg by mouth once as needed    Historical Provider, MD       Allergies:   Ceclor [cefaclor] and Lisinopril    Social:   reports that she has never smoked. She has never used smokeless tobacco.   reports no history of alcohol use. Social History     Substance and Sexual Activity   Drug Use Never       History reviewed. No pertinent family history. PHYSICAL EXAM:  I performed this physical examination. Vitals:  Patient Vitals for the past 24 hrs:   BP Temp Temp src Pulse Resp SpO2 Height Weight   08/20/20 2001 (!) 141/48 -- -- 51 14 96 % -- --   08/20/20 1846 (!) 123/40 -- -- (!) 49 17 91 % -- --   08/20/20 1831 (!) 124/35 -- -- (!) 49 21 92 % -- --   08/20/20 1815 (!) 120/41 -- -- (!) 45 13 95 % -- --   08/20/20 1801 (!) 118/51 -- -- 50 16 97 % -- --   08/20/20 1713 (!) 114/46 97.7 °F (36.5 °C) Oral (!) 40 16 94 % 5' 5\" (1.651 m) 225 lb (102.1 kg)     Room air    GEN:  Appearance:  Elderly female . Level of Consciousness:  alert . Orientation:  Responses are slow but pt does eventually correctly answer all orientation questions. HEENT: Sclera anicteric.  no conjunctival chemosis. Tacky mucus membranes. Hyperemic soft palate and oropharynx. Film of dark brown exudate adherent to the soft  palate and uvula. AC's and TM's normal  no specific or diagnostic oral lesions. NECK:  no signs of meningismus. Jugular veins not distended. Carotid pulses  2+.  no cervical lymphadenopathy. no thyromegaly. CV:  Slow irregular rhythm. normal S1 & S2.    no murmur. no rub.  no gallop. PULM:  Chest excursion is symmetric. Breath sounds are vesicular. Adventitious sounds:  none    AB:  Abdominal shape is obese. Bowel sounds are hypoactive but present. Generally soft to palpation. no tenderness is present. ventricular response ([facility] [I48.91] 08/20/2020    DIONNE (acute kidney injury) ([facility] [N17.9] 08/20/2020    Type 2 diabetes mellitus, with long-term current use of insulin ([facility] [E11.9, Z79.4] 08/20/2020    Essential hypertension [I10] 08/20/2020    GERD (gastroesophageal reflux disease) [K21.9] 08/20/2020    Dementia ([facility] [F03.90] 08/20/2020    Acquired hypothyroidism [E03.9] 08/20/2020       ASSESSMENT & PLAN  DIONNE on CKD 3, Hyperkalemia  -  Baseline creatinine ~ 1.4 mg/dL per outside labs. -  Awaiting current U/A.  -  Renal U/S requested to evaluate for hydronephrosis, masses, etc.  -  Received 1g calcium gluconate and 25mM NaHCO3 in the ER.  -  Give another 3g calcium gluconate, 50mEq NaHCO3, and kayexalate 30g po now. -  Repeat BMP at midnight. Afib w/ slow ventricular response, HTN  -  Avoid all ziyad agents. -  Atropine available prn for bradycardia combined with hypotension.  -  If SVR does not resolve w/ treatment of electrolyte aberrations will request cardiology evaluation.  -  Afib is chronic. Continue apixaban dose adjusted for reduced GFR.  -  Hold amlodipine, furosemide. Type 2 DM, Hypothyroidism  -  Hold all oral antidiabetic agents. Start s.c. Insulin regimen based on  home regimen.  -  Continue levothyroxine. Check TSH.     DVT prophylaxis: SCDs, apixaban  Code Status:  Full code  Disposition:  Inpatient    Joey Beatty MD

## 2020-08-21 NOTE — PROGRESS NOTES
Hospitalist Progress Note      PCP: Ginna Ruiz MD    Date of Admission: 8/20/2020    Chief Complaint: abnormal labs    Subjective: no new c/o. Medications:  Reviewed    Infusion Medications    sodium chloride 100 mL/hr at 08/20/20 1919    dextrose       Scheduled Medications    cefTRIAXone (ROCEPHIN) IV  1 g Intravenous Q24H    amLODIPine  10 mg Oral Daily    apixaban  2.5 mg Oral BID    atorvastatin  20 mg Oral Nightly    levothyroxine  125 mcg Oral Daily    pantoprazole  40 mg Oral QAM AC    sertraline  125 mg Oral Nightly    insulin glargine  0.15 Units/kg Subcutaneous Nightly    insulin lispro  0.05 Units/kg Subcutaneous TID WC    insulin lispro  0-6 Units Subcutaneous TID WC    insulin lispro  0-3 Units Subcutaneous Nightly     PRN Meds: sodium chloride flush, acetaminophen **OR** acetaminophen, promethazine **OR** ondansetron, glucose, dextrose, glucagon (rDNA), dextrose, melatonin ER, calcium carbonate, atropine    No intake or output data in the 24 hours ending 08/21/20 0937    Physical Exam Performed:    BP (!) 157/41   Pulse 53   Temp 97.7 °F (36.5 °C) (Oral)   Resp 20   Ht 5' 5\" (1.651 m)   Wt 225 lb (102.1 kg)   SpO2 98%   BMI 37.44 kg/m²     General appearance: No apparent distress, appears stated age and cooperative. HEENT: Pupils equal, round, and reactive to light. Conjunctivae/corneas clear. Neck: Supple, with full range of motion. No jugular venous distention. Trachea midline. Respiratory:  Normal respiratory effort. Clear to auscultation, bilaterally without Rales/Wheezes/Rhonchi. Cardiovascular: Regular rate and rhythm with normal S1/S2 without murmurs, rubs or gallops. Abdomen: Soft, non-tender, non-distended with normal bowel sounds. Musculoskeletal: No clubbing, cyanosis or edema bilaterally. Full range of motion without deformity. Skin: Skin color, texture, turgor normal.  No rashes or lesions.   Neurologic:  Neurovascularly intact without any focal sensory/motor deficits. Cranial nerves: II-XII intact, grossly non-focal.  Psychiatric: Alert and oriented, thought content appropriate, normal insight  Capillary Refill: Brisk,< 3 seconds   Peripheral Pulses: +2 palpable, equal bilaterally       Labs:   Recent Labs     08/20/20  1724 08/21/20  0510   WBC 5.8 6.0   HGB 10.9* 11.6*   HCT 33.4* 34.6*   * 104*     Recent Labs     08/20/20  1724 08/21/20  0510   * 141   K 6.8* 5.8*    113*   CO2 13* 14*   BUN 94* 80*   CREATININE 2.6* 1.7*   CALCIUM 9.8 10.7*     Recent Labs     08/20/20  1724   AST 92*   ALT 32   BILIDIR <0.2   BILITOT 0.6   ALKPHOS 101     Recent Labs     08/20/20  1724   INR 1.79*     No results for input(s): CKTOTAL, TROPONINI in the last 72 hours. Urinalysis:      Lab Results   Component Value Date    NITRU Negative 08/20/2020    WBCUA  08/20/2020    BACTERIA 2+ 08/20/2020    RBCUA 0-2 08/20/2020    BLOODU Negative 08/20/2020    SPECGRAV 1.025 08/20/2020    GLUCOSEU Negative 08/20/2020       Consults:    IP CONSULT TO HOSPITALIST      Assessment/Plan:    Active Hospital Problems    Diagnosis    Hyperkalemia [E87.5]    Acute kidney injury superimposed on chronic kidney disease ([facility] [N17.9, N18.9]    Stage 3 chronic kidney disease ([facility] [N18.3]    Atrial fibrillation with slow ventricular response ([facility] [I48.91]    DIONNE (acute kidney injury) ([facility] [N17.9]    Type 2 diabetes mellitus, with long-term current use of insulin (HCC) [E11.9, Z79.4]    Essential hypertension [I10]    GERD (gastroesophageal reflux disease) [K21.9]    Dementia (HCC) [F03.90]    Acquired hypothyroidism [E03.9]         ARF/CKD - baseline stage 3 w/ elevated BUN/Cr ratio c/w pre-renal azotemia. Given IVF hydration and follow serial labs. Reviewed and documented as above. Nephrology consulted and appreciated in advance.   -  Awaiting current U/A.  -  Renal U/S requested to evaluate for hydronephrosis, masses, etc.     HyperKalemia - likely 2nd to above. Follow serial labs. Reviewed and documented as above. -  Received 1g calcium gluconate and 25mM NaHCO3 in the ER.  -  Give another 3g calcium gluconate, 50mEq NaHCO3, and kayexalate 30g po on admission. HTN - w/out known CAD and no evidence of active signs/sxs of ischemia/failure. W/ Slow ventricular response on admission. Currently controlled on home meds w/ vitals reviewed and documented as above. Afib - chronic persistent of unspecified and clinically unable to determine etiology. Normally rate controlled on no ziyad agents. Anticoagulated at baseline on home Eliquis  - continued. Monitored on tele. GERD - w/out active signs/sxs of dysphagia/odynophagia. No evidence of active PUD or hx of GI bleed. Controlled on home PPI - continue. DM2 - controlled on home oral antiGlycemics/Insulin - held/continued respectively. Continue Lantus basal/bolus and follow FSBS/SSI low regimen. Last HbA1c 7.8% dated this admission. Anticipate resuming/continuing home regimen at discharge. HypoThyroid - clinically euthyroid on oral replacement therapy. Continue, w/ outpt monitoring as previously arranged. Dementia - w/out behavioral disturbance. Controlled on home medication regimen - continued. Continue supportive care and redirection as needed. Obesity -  With Body mass index is 37.44 kg/m². Complicating assessment and treatment. Placing patient at risk for multiple co-morbidities as well as early death and contributing to the patient's presentation. Counseled on weight loss. DVT prophylaxis:        SCDs, apixaban  Code Status:               Full code  Disposition:                 Inpatient      DVT Prophylaxis: Eliquis/IPC  Diet: DIET CARB CONTROL;  Code Status: Full Code      PT/OT Eval Status: not yet ordered. Dispo - possibly Sat/Sunday 22/23 August pending clinical course.      Geri Moe MD

## 2020-08-21 NOTE — CONSULTS
[facility]    [facility]              [phone]                    Plan :     Continue with the fluid-because of acidosis will change to bicarb drip  DC amlodipine due to low diastolic BP    Assessment :     Hyperkalemia  Potassium 6.8 on admission-down to 5.8  Treated medically  Repeat    Acidosis  Check venous pH  Start on bicarb drip  Anion gap is normal  Does not appear to have DKA    Acute Kidney Injury on CKD III  DIONNE likely due to -urinary tract infection, also has cirrhosis of the liver with decompensation  Cr on consultation 1.7 down from 2.6 on admission  Baseline Cr-1.5 from 4/9  She is followed by Dr. Isabella Boyer follow-up was 5/19    UA-leukocyte esterase, WBC present, otherwise bland  Renal Imaging:- pending  Echo: 11/18-EF normal moderate TR    Hypertension   BP: (143)/(41)  Pulse:  [86]   BP goal inpatient [id number] systolic inpatient  Known to have atrial fibrillation    Cirrhosis of the liver-with portal  Diabetes mellitus on insulin  Hyperuricemia    Pedal Edema  Watch for now      [facility] would like to thank Ronda Ocampo MD   for opportunity to serve this patient      Please call with questions at-   24 Hrs Answering service [phone] or  7 am- 5 pm via Perfect serve or cell phone  Dr.Sudhir Juan Chaudhary          CC/reason for consult :     hyperkalemia     HPI :     Ruth Mcgee is a 77 y.o. female presented to   the hospital on 8/20/2020 with acute renal failure and hyperkalemia. She had lab on on the day of admission which showed potassium around 6.9 with acute kidney injury because of which she was sent to the emergency room   She was also bradycardic which improved with atropine. For high potassium she was treated medically.   Her heart rate was 30s to 40s on presentation     Interval History:     k coming down    ROS:     Seen with- RM             PMH/PSH/SH/Family History:     Past Medical History:   Diagnosis Date    Anxiety     Arthritis     Subcutaneous, Nightly  melatonin ER tablet 2 mg, 2 mg, Oral, Nightly PRN  calcium carbonate (TUMS) chewable tablet 1,000 mg, 1,000 mg, Oral, TID PRN  atropine injection 0.5 mg, 0.5 mg, Intravenous, Q30 Min PRN       Vitals :     Vitals:    08/21/20 1030   BP: (!) 143/41   Pulse: 86   Resp: 18   Temp: 97.4 °F (36.3 °C)   SpO2: 94%       I & O :       Intake/Output Summary (Last 24 hours) at 8/21/2020 1230  Last data filed at 8/21/2020 1040  Gross per 24 hour   Intake 422 ml   Output --   Net 422 ml        Physical Examination :     Due to the current efforts to prevent transmission of COVID-19 and also the need to preserve PPE for other caregivers, a face-to-face encounter with the patient was not performed. That being said, all relevant records and diagnostic tests were reviewed, including laboratory results and imaging. Please reference any relevant documentation elsewhere. Care will be coordinated with the primary service.     Seen from window  Discussed with staff and RN      LABS:     Recent Labs     08/20/20  1724 08/21/20  0510   WBC 5.8 6.0   HGB 10.9* 11.6*   HCT 33.4* 34.6*   * 104*     Recent Labs     08/20/20  1724 08/21/20  0510   * 141   K 6.8* 5.8*    113*   CO2 13* 14*   BUN 94* 80*   CREATININE 2.6* 1.7*   GLUCOSE 161* 122*   MG 1.80 1.70*

## 2020-08-21 NOTE — CONSULTS
Thanks for consulting [facility] for the care of Jakub. Full consult will follow  Please call with questions at-  24 Hrs Answering service [phone]  Perfect serve, or cell phone 7 am - 242 [facility], MD   [facility] nephrology  [facility]

## 2020-08-21 NOTE — CARE COORDINATION
CASE MANAGEMENT INITIAL ASSESSMENT      Reviewed chart and completed assessment via telephone with: Pt's JAJA Hook  Explained Case Management role/services. :     Admit date/status: 8/20/20  Diagnosis: DIONNE  Is this a Readmission?: No    Insurance: [employer] required for SNF - Y for Skilled        3 night stay required -  N    Living arrangements, Adls, care needs, prior to admission: Pt [facility] at [facility]. 79.: No preference     Durable Medical Equipment at home: Walker__Cane__RTS__ BSC__Shower Chair__  02__ HHN__ CPAP__  BiPap__  Hospital Bed__ W/C___ Sapphire Sprinkle _________    Services in the home and/or outpatient, prior to admission: [place] (if applicable) N/A   · Name:  · Address:  · Dialysis Schedule:  · Phone:  · Fax:    PT/OT recs: Not yet ordered     Hospital Exemption Notification (HEN): N/A return     Barriers to discharge: None     Plan/comments: 8/21/20 Pt is LTC at [facility] Unable to reach pt by phone. [facility] and confirmed that pt is LTC and there are no barriers to return. Pt will need precert to return skilled. Pt will need negative COVID within 24-48 hours prior to d/c. Ordered today,. I called Pt's listed Claude Sotero and he tells me that he owns a [address] and that he and the pt were in the process of parting ways. However the formal separation has not occurred and he is still her POA as of now. I also tried to call pt's cousin Cristine Stark also listed as a contact. I did not get an answer from Cristine Stark.           ECOC on chart for MD signature

## 2020-08-22 ENCOUNTER — APPOINTMENT (OUTPATIENT)
Dept: ULTRASOUND IMAGING | Age: 70
DRG: 683 | End: 2020-08-22
Payer: COMMERCIAL

## 2020-08-22 LAB
ANION GAP SERPL CALCULATED.3IONS-SCNC: 9 MMOL/L (ref 3–16)
BASOPHILS ABSOLUTE: 0 K/UL (ref 0–0.2)
BASOPHILS RELATIVE PERCENT: 0.5 %
BUN BLDV-MCNC: 51 MG/DL (ref 7–20)
CALCIUM SERPL-MCNC: 9.6 MG/DL (ref 8.3–10.6)
CHLORIDE BLD-SCNC: 113 MMOL/L (ref 99–110)
CO2: 18 MMOL/L (ref 21–32)
CREAT SERPL-MCNC: 1 MG/DL (ref 0.6–1.2)
EOSINOPHILS ABSOLUTE: 0.1 K/UL (ref 0–0.6)
EOSINOPHILS RELATIVE PERCENT: 2.1 %
GFR AFRICAN AMERICAN: >60
GFR NON-AFRICAN AMERICAN: 55
GLUCOSE BLD-MCNC: 146 MG/DL (ref 70–99)
GLUCOSE BLD-MCNC: 155 MG/DL (ref 70–99)
GLUCOSE BLD-MCNC: 159 MG/DL (ref 70–99)
GLUCOSE BLD-MCNC: 176 MG/DL (ref 70–99)
GLUCOSE BLD-MCNC: 192 MG/DL (ref 70–99)
HCT VFR BLD CALC: 29.7 % (ref 36–48)
HEMOGLOBIN: 9.9 G/DL (ref 12–16)
LYMPHOCYTES ABSOLUTE: 0.5 K/UL (ref 1–5.1)
LYMPHOCYTES RELATIVE PERCENT: 15.2 %
MAGNESIUM: 1.4 MG/DL (ref 1.8–2.4)
MCH RBC QN AUTO: 31.1 PG (ref 26–34)
MCHC RBC AUTO-ENTMCNC: 33.3 G/DL (ref 31–36)
MCV RBC AUTO: 93.5 FL (ref 80–100)
MONOCYTES ABSOLUTE: 0.3 K/UL (ref 0–1.3)
MONOCYTES RELATIVE PERCENT: 8.8 %
NEUTROPHILS ABSOLUTE: 2.5 K/UL (ref 1.7–7.7)
NEUTROPHILS RELATIVE PERCENT: 73.4 %
PDW BLD-RTO: 14.1 % (ref 12.4–15.4)
PERFORMED ON: ABNORMAL
PLATELET # BLD: 84 K/UL (ref 135–450)
PLATELET SLIDE REVIEW: ABNORMAL
PMV BLD AUTO: 8.6 FL (ref 5–10.5)
POTASSIUM SERPL-SCNC: 4.9 MMOL/L (ref 3.5–5.1)
RBC # BLD: 3.17 M/UL (ref 4–5.2)
S PYO THROAT QL CULT: NORMAL
SLIDE REVIEW: ABNORMAL
SODIUM BLD-SCNC: 140 MMOL/L (ref 136–145)
WBC # BLD: 3.4 K/UL (ref 4–11)

## 2020-08-22 PROCEDURE — 85025 COMPLETE CBC W/AUTO DIFF WBC: CPT

## 2020-08-22 PROCEDURE — 6360000002 HC RX W HCPCS: Performed by: HOSPITALIST

## 2020-08-22 PROCEDURE — 6370000000 HC RX 637 (ALT 250 FOR IP): Performed by: INTERNAL MEDICINE

## 2020-08-22 PROCEDURE — 83735 ASSAY OF MAGNESIUM: CPT

## 2020-08-22 PROCEDURE — 2500000003 HC RX 250 WO HCPCS: Performed by: INTERNAL MEDICINE

## 2020-08-22 PROCEDURE — 76770 US EXAM ABDO BACK WALL COMP: CPT

## 2020-08-22 PROCEDURE — 2580000003 HC RX 258: Performed by: INTERNAL MEDICINE

## 2020-08-22 PROCEDURE — 6360000002 HC RX W HCPCS: Performed by: INTERNAL MEDICINE

## 2020-08-22 PROCEDURE — 80048 BASIC METABOLIC PNL TOTAL CA: CPT

## 2020-08-22 PROCEDURE — 1200000000 HC SEMI PRIVATE

## 2020-08-22 RX ORDER — MAGNESIUM SULFATE 1 G/100ML
1 INJECTION INTRAVENOUS ONCE
Status: COMPLETED | OUTPATIENT
Start: 2020-08-22 | End: 2020-08-22

## 2020-08-22 RX ORDER — AMLODIPINE BESYLATE 5 MG/1
5 TABLET ORAL DAILY
Status: DISCONTINUED | OUTPATIENT
Start: 2020-08-22 | End: 2020-08-23

## 2020-08-22 RX ADMIN — ATORVASTATIN CALCIUM 20 MG: 10 TABLET, FILM COATED ORAL at 21:18

## 2020-08-22 RX ADMIN — INSULIN LISPRO 5 UNITS: 100 INJECTION, SOLUTION INTRAVENOUS; SUBCUTANEOUS at 17:22

## 2020-08-22 RX ADMIN — AMLODIPINE BESYLATE 5 MG: 5 TABLET ORAL at 09:21

## 2020-08-22 RX ADMIN — INSULIN LISPRO 1 UNITS: 100 INJECTION, SOLUTION INTRAVENOUS; SUBCUTANEOUS at 07:55

## 2020-08-22 RX ADMIN — CEFTRIAXONE SODIUM 1 G: 1 INJECTION, POWDER, FOR SOLUTION INTRAMUSCULAR; INTRAVENOUS at 06:49

## 2020-08-22 RX ADMIN — MAGNESIUM SULFATE HEPTAHYDRATE 1 G: 1 INJECTION, SOLUTION INTRAVENOUS at 11:48

## 2020-08-22 RX ADMIN — LEVOTHYROXINE SODIUM 125 MCG: 0.12 TABLET ORAL at 06:50

## 2020-08-22 RX ADMIN — SERTRALINE HYDROCHLORIDE 125 MG: 50 TABLET, FILM COATED ORAL at 21:18

## 2020-08-22 RX ADMIN — APIXABAN 2.5 MG: 2.5 TABLET, FILM COATED ORAL at 21:18

## 2020-08-22 RX ADMIN — INSULIN LISPRO 1 UNITS: 100 INJECTION, SOLUTION INTRAVENOUS; SUBCUTANEOUS at 17:22

## 2020-08-22 RX ADMIN — INSULIN GLARGINE 15 UNITS: 100 INJECTION, SOLUTION SUBCUTANEOUS at 21:19

## 2020-08-22 RX ADMIN — INSULIN LISPRO 1 UNITS: 100 INJECTION, SOLUTION INTRAVENOUS; SUBCUTANEOUS at 11:47

## 2020-08-22 RX ADMIN — INSULIN LISPRO 1 UNITS: 100 INJECTION, SOLUTION INTRAVENOUS; SUBCUTANEOUS at 21:19

## 2020-08-22 RX ADMIN — APIXABAN 2.5 MG: 2.5 TABLET, FILM COATED ORAL at 07:54

## 2020-08-22 RX ADMIN — SODIUM BICARBONATE: 84 INJECTION, SOLUTION INTRAVENOUS at 06:49

## 2020-08-22 RX ADMIN — SODIUM BICARBONATE: 84 INJECTION, SOLUTION INTRAVENOUS at 09:49

## 2020-08-22 RX ADMIN — ACETAMINOPHEN 650 MG: 325 TABLET ORAL at 15:39

## 2020-08-22 RX ADMIN — PANTOPRAZOLE SODIUM 40 MG: 40 TABLET, DELAYED RELEASE ORAL at 06:50

## 2020-08-22 RX ADMIN — INSULIN LISPRO 5 UNITS: 100 INJECTION, SOLUTION INTRAVENOUS; SUBCUTANEOUS at 11:47

## 2020-08-22 RX ADMIN — INSULIN LISPRO 5 UNITS: 100 INJECTION, SOLUTION INTRAVENOUS; SUBCUTANEOUS at 07:55

## 2020-08-22 RX ADMIN — SODIUM BICARBONATE: 84 INJECTION, SOLUTION INTRAVENOUS at 21:22

## 2020-08-22 ASSESSMENT — PAIN SCALES - GENERAL
PAINLEVEL_OUTOF10: 0
PAINLEVEL_OUTOF10: 2
PAINLEVEL_OUTOF10: 0
PAINLEVEL_OUTOF10: 4
PAINLEVEL_OUTOF10: 0

## 2020-08-22 ASSESSMENT — PAIN DESCRIPTION - LOCATION: LOCATION: BACK

## 2020-08-22 ASSESSMENT — PAIN DESCRIPTION - ORIENTATION: ORIENTATION: LOWER

## 2020-08-22 NOTE — PROGRESS NOTES
Patient admitted to room 201 from 222. Patient oriented to room, bed and side rails, bathroom, how to use the call light, and instructed how to order meals with prescribed diet. Bed is locked in lowest position, side rails up 2/4, call light within reach. Patient denies any further needs at this time. Patient instructed about the schedule of the day including: vital sign frequency, lab draws, possible tests, frequency of MD and staff rounds, including RN/MD rounding together at bedside, daily weights, and I &O's. Will continue to monitor.

## 2020-08-22 NOTE — PROGRESS NOTES
DEBBY Quiroz paged: Pt admitted with hyperkalemia and DIONNE. Pt is complaining of muscle spasms and is requesting her Zanaflex that she takes q6h at home. Could this or something similar be ordered d/t dx of DIONNE? thank you! One time 5mg valium ordered.

## 2020-08-22 NOTE — PROGRESS NOTES
[facility]    [facility]              [phone]                    Plan :     Creatinines better to 1.0    Continue IV fluid with bicarbonate. Replace IV magnesium. Assessment :     Hyperkalemia  Potassium 6.8 on admission-down to 4.9  Treated medically  Repeat    Acidosis  On bicarbonate drip. Acute Kidney Injury on CKD III  DIONNE likely due to -urinary tract infection, also has cirrhosis of the liver with decompensation  Cr on consultation 1.7 down from 2.6 on admission  Baseline Cr-1.5 from 4/9  She is followed by Dr. Helen De Jesus follow-up was 5/19    UA-leukocyte esterase, WBC present, otherwise bland  Renal Imaging:- pending  Echo: 11/18-EF normal moderate TR    Hypertension   BP: (154)/(51)  Pulse:  [65]   BP goal inpatient [id number] systolic inpatient  Known to have atrial fibrillation    Cirrhosis of the liver-with portal  Diabetes mellitus on insulin  Hyperuricemia    Pedal Edema  Watch for now      [facility]phrology would like to thank Nicolette Bain MD   for opportunity to serve this patient      Please call with questions at-   24 Hrs Answering service [phone] or  7 am- 5 pm via Perfect serve or cell phone  Virgil Echevarria          CC/reason for consult :     hyperkalemia     HPI :     Rosalinda Marroquin is a 77 y.o. female presented to   the hospital on 8/20/2020 with acute renal failure and hyperkalemia. She had lab on on the day of admission which showed potassium around 6.9 with acute kidney injury because of which she was sent to the emergency room   She was also bradycardic which improved with atropine. For high potassium she was treated medically. Her heart rate was 30s to 40s on presentation     Interval History:     Urine output is 550 mL. Blood pressure is mostly stable.     ROS:     Seen with- no family in the room             PMH/PSH/SH/Family History:     Past Medical History:   Diagnosis Date    Anxiety     Arthritis     Atrial fibrillation [facility])     CAD (coronary artery disease)     CHF (congestive heart failure) (HCC)     Chronic pain syndrome     [facility]     Depression     Diabetes mellitus ([facility]     GERD (gastroesophageal reflux disease)     Hyperlipidemia     Hypertension     Kidney disease     Thyroid disease     Vitamin D deficiency        History reviewed. No pertinent surgical history. reports that she has never smoked. She has never used smokeless tobacco. She reports that she does not drink alcohol or use drugs. family history is not on file.          Medication:     Current Facility-Administered Medications: cefTRIAXone (ROCEPHIN) 1 g IVPB in 50 mL D5W minibag, 1 g, Intravenous, Q24H  sodium bicarbonate 70 mEq in sodium chloride 0.45 % 1,000 mL infusion, , Intravenous, Continuous  apixaban (ELIQUIS) tablet 2.5 mg, 2.5 mg, Oral, BID  atorvastatin (LIPITOR) tablet 20 mg, 20 mg, Oral, Nightly  levothyroxine (SYNTHROID) tablet 125 mcg, 125 mcg, Oral, Daily  pantoprazole (PROTONIX) tablet 40 mg, 40 mg, Oral, QAM AC  sertraline (ZOLOFT) tablet 125 mg, 125 mg, Oral, Nightly  sodium chloride flush 0.9 % injection 10 mL, 10 mL, Intravenous, PRN  acetaminophen (TYLENOL) tablet 650 mg, 650 mg, Oral, Q6H PRN **OR** acetaminophen (TYLENOL) suppository 650 mg, 650 mg, Rectal, Q6H PRN  promethazine (PHENERGAN) tablet 12.5 mg, 12.5 mg, Oral, Q6H PRN **OR** ondansetron (ZOFRAN) injection 4 mg, 4 mg, Intravenous, Q6H PRN  glucose (GLUTOSE) 40 % oral gel 15 g, 15 g, Oral, PRN  dextrose 50 % IV solution, 12.5 g, Intravenous, PRN  glucagon (rDNA) injection 1 mg, 1 mg, Intramuscular, PRN  dextrose 5 % solution, 100 mL/hr, Intravenous, PRN  insulin glargine (LANTUS) injection vial 15 Units, 0.15 Units/kg, Subcutaneous, Nightly  insulin lispro (HUMALOG) injection vial 5 Units, 0.05 Units/kg, Subcutaneous, TID WC  insulin lispro (HUMALOG) injection vial 0-6 Units, 0-6 Units, Subcutaneous, TID WC  insulin lispro (HUMALOG) injection vial 0-3

## 2020-08-22 NOTE — PROGRESS NOTES
Hospitalist Progress Note      PCP: Tommy Dobbins MD    Date of Admission: 8/20/2020    Chief Complaint: abnormal labs       Hospital Course: This is a 71-year-old female admitted with abnormal labs, on admission noted to have hyperkalemia, hyponatremia, acute kidney injury on chronic kidney disease, elevated BNP, COVID-19 negative patient will be transferred to medical floor. Subjective: Patient denies any chest pain or shortness of breath no nausea vomiting      Medications:  Reviewed    Infusion Medications    IV infusion builder      dextrose       Scheduled Medications    amLODIPine  5 mg Oral Daily    cefTRIAXone (ROCEPHIN) IV  1 g Intravenous Q24H    apixaban  2.5 mg Oral BID    atorvastatin  20 mg Oral Nightly    levothyroxine  125 mcg Oral Daily    pantoprazole  40 mg Oral QAM AC    sertraline  125 mg Oral Nightly    insulin glargine  0.15 Units/kg Subcutaneous Nightly    insulin lispro  0.05 Units/kg Subcutaneous TID WC    insulin lispro  0-6 Units Subcutaneous TID WC    insulin lispro  0-3 Units Subcutaneous Nightly     PRN Meds: sodium chloride flush, acetaminophen **OR** acetaminophen, promethazine **OR** ondansetron, glucose, dextrose, glucagon (rDNA), dextrose, melatonin ER, calcium carbonate, atropine      Intake/Output Summary (Last 24 hours) at 8/22/2020 0921  Last data filed at 8/22/2020 8463  Gross per 24 hour   Intake 2754 ml   Output 550 ml   Net 2204 ml       Physical Exam Performed:    BP (!) 154/51   Pulse 65   Temp 98.3 °F (36.8 °C) (Oral)   Resp 15   Ht 5' 5\" (1.651 m)   Wt 240 lb 15.4 oz (109.3 kg)   SpO2 98%   BMI 40.10 kg/m²     General appearance: No apparent distress, appears stated age and cooperative. HEENT: Pupils equal, round, and reactive to light. Conjunctivae/corneas clear. Neck: Supple, with full range of motion. No jugular venous distention. Trachea midline. Respiratory:  Normal respiratory effort.  Clear to auscultation, bilaterally without Rales/Wheezes/Rhonchi. Cardiovascular: Regular rate and rhythm with normal S1/S2 without murmurs, rubs or gallops. Abdomen: Soft, non-tender, non-distended with normal bowel sounds. Musculoskeletal: No clubbing, cyanosis or edema bilaterally. Full range of motion without deformity. Skin: Skin color, texture, turgor normal.  No rashes or lesions. Neurologic:  Neurovascularly intact without any focal sensory/motor deficits. Cranial nerves: II-XII intact, grossly non-focal.  Psychiatric: Alert and oriented, thought content appropriate, normal insight  Capillary Refill: Brisk,< 3 seconds   Peripheral Pulses: +2 palpable, equal bilaterally       Labs:   Recent Labs     08/20/20  1724 08/21/20  0510 08/22/20  0645   WBC 5.8 6.0 3.4*   HGB 10.9* 11.6* 9.9*   HCT 33.4* 34.6* 29.7*   * 104* 84*     Recent Labs     08/21/20  0510 08/21/20  1415 08/22/20  0645    141 140   K 5.8* 5.3* 4.9   * 113* 113*   CO2 14* 14* 18*   BUN 80* 68* 51*   CREATININE 1.7* 1.3* 1.0   CALCIUM 10.7* 10.2 9.6   PHOS  --  3.0  --      Recent Labs     08/20/20  1724   AST 92*   ALT 32   BILIDIR <0.2   BILITOT 0.6   ALKPHOS 101     Recent Labs     08/20/20  1724   INR 1.79*     No results for input(s): CKTOTAL, TROPONINI in the last 72 hours. Urinalysis:      Lab Results   Component Value Date    NITRU Negative 08/20/2020    WBCUA  08/20/2020    BACTERIA 2+ 08/20/2020    RBCUA 0-2 08/20/2020    BLOODU Negative 08/20/2020    SPECGRAV 1.025 08/20/2020    GLUCOSEU Negative 08/20/2020       Radiology:  XR CHEST PORTABLE   Final Result   Bilateral pulmonary opacities favored to be due to mild edema.   Atypical   pneumonia also in the differential.         US RENAL COMPLETE    (Results Pending)           Assessment/Plan:    Active Hospital Problems    Diagnosis    Hyperkalemia [E87.5]    Acute kidney injury superimposed on chronic kidney disease ([facility] [N17.9, N18.9]    Stage 3 chronic kidney disease ([facility] [N18.3]  Atrial fibrillation with slow ventricular response ([facility]) [I48.91]    DIONNE (acute kidney injury) ([facility] [N17.9]    Type 2 diabetes mellitus, with long-term current use of insulin (HCC) [E11.9, Z79.4]    Essential hypertension [I10]    GERD (gastroesophageal reflux disease) [K21.9]    Dementia ([facility]) [F03.90]    Acquired hypothyroidism [E03.9]     1. Acute kidney injury on chronic kidney disease nephrology consulted and following. 2.  Hyperkalemia status post treatment improved. 3.  Hypertension controlled continue with home medication. 4.  Atrial fibrillation rate is controlled on chronic anticoagulation with Eliquis. 5.  GERD continue the PPI. 6.  Diabetes mellitus type 2 continue with current care hemoglobin A1c= 7.8 on 8/21/2020.  7.  Hypothyroidism on Synthroid. 8.  Dementia without behavioral disturbances controlled on home medication  9. Obesity BMI 40.10    DVT Prophylaxis: Eliquis  Diet: DIET CARB CONTROL;  Code Status: Full Code    PT/OT Eval Status: Consult physical Occupational Therapy.     Collette Alarcon MD

## 2020-08-22 NOTE — PROGRESS NOTES
End of shift report given to Kim Barron RN at bedside. Patient alert and oriented. Bed in lowest position with wheels locked. Call light within reach. No further needs at this time.

## 2020-08-22 NOTE — PLAN OF CARE
Problem: Falls - Risk of:  Goal: Will remain free from falls  Description: Will remain free from falls  Outcome: Ongoing  Note: Pt remains free from fall and injury. Non-skid slippers on. Fall risk band on wrist. Instructed to call for assistance. Call light in reach, will monitor.     Goal: Absence of physical injury  Description: Absence of physical injury  Outcome: Ongoing     Problem: Skin Integrity:  Goal: Will show no infection signs and symptoms  Description: Will show no infection signs and symptoms  Outcome: Ongoing  Goal: Absence of new skin breakdown  Description: Absence of new skin breakdown  Outcome: Ongoing

## 2020-08-23 LAB
ANION GAP SERPL CALCULATED.3IONS-SCNC: 10 MMOL/L (ref 3–16)
BASOPHILS ABSOLUTE: 0 K/UL (ref 0–0.2)
BASOPHILS RELATIVE PERCENT: 0.4 %
BUN BLDV-MCNC: 34 MG/DL (ref 7–20)
CALCIUM SERPL-MCNC: 9.7 MG/DL (ref 8.3–10.6)
CHLORIDE BLD-SCNC: 108 MMOL/L (ref 99–110)
CO2: 21 MMOL/L (ref 21–32)
CREAT SERPL-MCNC: 0.9 MG/DL (ref 0.6–1.2)
EOSINOPHILS ABSOLUTE: 0.1 K/UL (ref 0–0.6)
EOSINOPHILS RELATIVE PERCENT: 1.9 %
GFR AFRICAN AMERICAN: >60
GFR NON-AFRICAN AMERICAN: >60
GLUCOSE BLD-MCNC: 121 MG/DL (ref 70–99)
GLUCOSE BLD-MCNC: 139 MG/DL (ref 70–99)
GLUCOSE BLD-MCNC: 173 MG/DL (ref 70–99)
GLUCOSE BLD-MCNC: 201 MG/DL (ref 70–99)
GLUCOSE BLD-MCNC: 204 MG/DL (ref 70–99)
HCT VFR BLD CALC: 32.7 % (ref 36–48)
HEMOGLOBIN: 10.9 G/DL (ref 12–16)
LYMPHOCYTES ABSOLUTE: 0.7 K/UL (ref 1–5.1)
LYMPHOCYTES RELATIVE PERCENT: 16.2 %
MAGNESIUM: 1.5 MG/DL (ref 1.8–2.4)
MCH RBC QN AUTO: 30.7 PG (ref 26–34)
MCHC RBC AUTO-ENTMCNC: 33.4 G/DL (ref 31–36)
MCV RBC AUTO: 91.9 FL (ref 80–100)
MONOCYTES ABSOLUTE: 0.3 K/UL (ref 0–1.3)
MONOCYTES RELATIVE PERCENT: 7.4 %
NEUTROPHILS ABSOLUTE: 3.1 K/UL (ref 1.7–7.7)
NEUTROPHILS RELATIVE PERCENT: 74.1 %
PDW BLD-RTO: 13.8 % (ref 12.4–15.4)
PERFORMED ON: ABNORMAL
PLATELET # BLD: 98 K/UL (ref 135–450)
PMV BLD AUTO: 8.5 FL (ref 5–10.5)
POTASSIUM SERPL-SCNC: 4.5 MMOL/L (ref 3.5–5.1)
RBC # BLD: 3.56 M/UL (ref 4–5.2)
SODIUM BLD-SCNC: 139 MMOL/L (ref 136–145)
WBC # BLD: 4.2 K/UL (ref 4–11)

## 2020-08-23 PROCEDURE — 36415 COLL VENOUS BLD VENIPUNCTURE: CPT

## 2020-08-23 PROCEDURE — 85025 COMPLETE CBC W/AUTO DIFF WBC: CPT

## 2020-08-23 PROCEDURE — 97161 PT EVAL LOW COMPLEX 20 MIN: CPT

## 2020-08-23 PROCEDURE — 83735 ASSAY OF MAGNESIUM: CPT

## 2020-08-23 PROCEDURE — 97530 THERAPEUTIC ACTIVITIES: CPT

## 2020-08-23 PROCEDURE — 6370000000 HC RX 637 (ALT 250 FOR IP): Performed by: INTERNAL MEDICINE

## 2020-08-23 PROCEDURE — 1200000000 HC SEMI PRIVATE

## 2020-08-23 PROCEDURE — 6360000002 HC RX W HCPCS: Performed by: INTERNAL MEDICINE

## 2020-08-23 PROCEDURE — 97166 OT EVAL MOD COMPLEX 45 MIN: CPT

## 2020-08-23 PROCEDURE — 2580000003 HC RX 258: Performed by: INTERNAL MEDICINE

## 2020-08-23 PROCEDURE — 80048 BASIC METABOLIC PNL TOTAL CA: CPT

## 2020-08-23 PROCEDURE — 97535 SELF CARE MNGMENT TRAINING: CPT

## 2020-08-23 RX ORDER — MAGNESIUM SULFATE IN WATER 40 MG/ML
2 INJECTION, SOLUTION INTRAVENOUS ONCE
Status: COMPLETED | OUTPATIENT
Start: 2020-08-23 | End: 2020-08-23

## 2020-08-23 RX ORDER — AMLODIPINE BESYLATE 5 MG/1
10 TABLET ORAL DAILY
Status: DISCONTINUED | OUTPATIENT
Start: 2020-08-23 | End: 2020-08-24 | Stop reason: HOSPADM

## 2020-08-23 RX ADMIN — Medication 10 ML: at 22:14

## 2020-08-23 RX ADMIN — INSULIN LISPRO 5 UNITS: 100 INJECTION, SOLUTION INTRAVENOUS; SUBCUTANEOUS at 12:24

## 2020-08-23 RX ADMIN — ACETAMINOPHEN 650 MG: 325 TABLET ORAL at 22:29

## 2020-08-23 RX ADMIN — ATORVASTATIN CALCIUM 20 MG: 10 TABLET, FILM COATED ORAL at 22:14

## 2020-08-23 RX ADMIN — APIXABAN 2.5 MG: 2.5 TABLET, FILM COATED ORAL at 09:42

## 2020-08-23 RX ADMIN — INSULIN LISPRO 5 UNITS: 100 INJECTION, SOLUTION INTRAVENOUS; SUBCUTANEOUS at 09:07

## 2020-08-23 RX ADMIN — INSULIN LISPRO 5 UNITS: 100 INJECTION, SOLUTION INTRAVENOUS; SUBCUTANEOUS at 17:24

## 2020-08-23 RX ADMIN — MAGNESIUM SULFATE IN WATER 2 G: 40 INJECTION, SOLUTION INTRAVENOUS at 09:45

## 2020-08-23 RX ADMIN — Medication 10 ML: at 09:43

## 2020-08-23 RX ADMIN — APIXABAN 2.5 MG: 2.5 TABLET, FILM COATED ORAL at 22:14

## 2020-08-23 RX ADMIN — INSULIN LISPRO 2 UNITS: 100 INJECTION, SOLUTION INTRAVENOUS; SUBCUTANEOUS at 12:24

## 2020-08-23 RX ADMIN — AMLODIPINE BESYLATE 10 MG: 5 TABLET ORAL at 09:42

## 2020-08-23 RX ADMIN — PANTOPRAZOLE SODIUM 40 MG: 40 TABLET, DELAYED RELEASE ORAL at 06:15

## 2020-08-23 RX ADMIN — INSULIN GLARGINE 15 UNITS: 100 INJECTION, SOLUTION SUBCUTANEOUS at 22:25

## 2020-08-23 RX ADMIN — Medication 2 MG: at 22:29

## 2020-08-23 RX ADMIN — SERTRALINE HYDROCHLORIDE 125 MG: 50 TABLET, FILM COATED ORAL at 22:13

## 2020-08-23 RX ADMIN — CEFTRIAXONE SODIUM 1 G: 1 INJECTION, POWDER, FOR SOLUTION INTRAMUSCULAR; INTRAVENOUS at 06:15

## 2020-08-23 RX ADMIN — INSULIN LISPRO 1 UNITS: 100 INJECTION, SOLUTION INTRAVENOUS; SUBCUTANEOUS at 22:14

## 2020-08-23 RX ADMIN — INSULIN LISPRO 2 UNITS: 100 INJECTION, SOLUTION INTRAVENOUS; SUBCUTANEOUS at 17:24

## 2020-08-23 RX ADMIN — LEVOTHYROXINE SODIUM 125 MCG: 0.12 TABLET ORAL at 06:15

## 2020-08-23 ASSESSMENT — PAIN SCALES - GENERAL
PAINLEVEL_OUTOF10: 4
PAINLEVEL_OUTOF10: 0

## 2020-08-23 NOTE — PROGRESS NOTES
Hospitalist Progress Note      PCP: Tej Whelan MD    Date of Admission: 8/20/2020    Chief Complaint: Abnormal labs    Hospital Course: This is a 61-year-old female admitted with abnormal labs, on admission noted to have hyperkalemia, hyponatremia, acute kidney injury on chronic kidney disease, elevated BNP, COVID-19 negative patient was transferred to medical floor on 8/22/20    Subjective: Patient sitting in a chair lives in a nursing home denies any chest pain or shortness of breath no nausea vomiting. Medications:  Reviewed    Infusion Medications    dextrose       Scheduled Medications    amLODIPine  10 mg Oral Daily    magnesium sulfate  2 g Intravenous Once    cefTRIAXone (ROCEPHIN) IV  1 g Intravenous Q24H    apixaban  2.5 mg Oral BID    atorvastatin  20 mg Oral Nightly    levothyroxine  125 mcg Oral Daily    pantoprazole  40 mg Oral QAM AC    sertraline  125 mg Oral Nightly    insulin glargine  0.15 Units/kg Subcutaneous Nightly    insulin lispro  0.05 Units/kg Subcutaneous TID WC    insulin lispro  0-6 Units Subcutaneous TID WC    insulin lispro  0-3 Units Subcutaneous Nightly     PRN Meds: sodium chloride flush, acetaminophen **OR** acetaminophen, promethazine **OR** ondansetron, glucose, dextrose, glucagon (rDNA), dextrose, melatonin ER, calcium carbonate, atropine      Intake/Output Summary (Last 24 hours) at 8/23/2020 0845  Last data filed at 8/23/2020 0806  Gross per 24 hour   Intake --   Output 870 ml   Net -870 ml       Physical Exam Performed:    BP (!) 150/72   Pulse 76   Temp 99 °F (37.2 °C) (Oral)   Resp 16   Ht 5' 5\" (1.651 m)   Wt 242 lb (109.8 kg)   SpO2 96%   BMI 40.27 kg/m²     General appearance: No apparent distress, appears stated age and cooperative. HEENT: Pupils equal, round, and reactive to light. Conjunctivae/corneas clear. Neck: Supple, with full range of motion. No jugular venous distention. Trachea midline.   Respiratory:  Normal respiratory effort. Clear to auscultation, bilaterally without Rales/Wheezes/Rhonchi. Cardiovascular: Regular rate and rhythm with normal S1/S2 without murmurs, rubs or gallops. Abdomen: Soft, non-tender, non-distended with normal bowel sounds. Musculoskeletal: No clubbing, cyanosis or edema bilaterally. Full range of motion without deformity. Skin: Skin color, texture, turgor normal.  No rashes or lesions. Neurologic:  Neurovascularly intact without any focal sensory/motor deficits. Cranial nerves: II-XII intact, grossly non-focal.  Psychiatric: Alert and oriented, thought content appropriate, normal insight  Capillary Refill: Brisk,< 3 seconds   Peripheral Pulses: +2 palpable, equal bilaterally       Labs:   Recent Labs     08/21/20  0510 08/22/20  0645 08/23/20  0613   WBC 6.0 3.4* 4.2   HGB 11.6* 9.9* 10.9*   HCT 34.6* 29.7* 32.7*   * 84* 98*     Recent Labs     08/21/20  1415 08/22/20  0645 08/23/20  0613    140 139   K 5.3* 4.9 4.5   * 113* 108   CO2 14* 18* 21   BUN 68* 51* 34*   CREATININE 1.3* 1.0 0.9   CALCIUM 10.2 9.6 9.7   PHOS 3.0  --   --      Recent Labs     08/20/20  1724   AST 92*   ALT 32   BILIDIR <0.2   BILITOT 0.6   ALKPHOS 101     Recent Labs     08/20/20  1724   INR 1.79*     No results for input(s): CKTOTAL, TROPONINI in the last 72 hours. Urinalysis:      Lab Results   Component Value Date    NITRU Negative 08/20/2020    WBCUA  08/20/2020    BACTERIA 2+ 08/20/2020    RBCUA 0-2 08/20/2020    BLOODU Negative 08/20/2020    SPECGRAV 1.025 08/20/2020    GLUCOSEU Negative 08/20/2020       Radiology:  US RENAL COMPLETE   Final Result   No hydronephrosis noted      Echogenic foci right kidney, either focal islands of fat or nonobstructing   renal stones         XR CHEST PORTABLE   Final Result   Bilateral pulmonary opacities favored to be due to mild edema.   Atypical   pneumonia also in the differential.                 Assessment/Plan:    Active Hospital Problems Diagnosis    Hyperkalemia [E87.5]    Acute kidney injury superimposed on chronic kidney disease ([facility] [N17.9, N18.9]    Stage 3 chronic kidney disease ([facility] [N18.3]    Atrial fibrillation with slow ventricular response ([facility]) [I48.91]    DIONNE (acute kidney injury) ([facility] [N17.9]    Type 2 diabetes mellitus, with long-term current use of insulin ([facility]) [E11.9, Z79.4]    Essential hypertension [I10]    GERD (gastroesophageal reflux disease) [K21.9]    Dementia ([facility]) [F03.90]    Acquired hypothyroidism [E03.9]     1. Acute kidney injury on chronic kidney disease nephrology consulted and following. Creatinine at baseline now and is stable. Urine culture negative we will discontinue IV Rocephin. Will consult social service for discharge planning. 2.  Hyperkalemia status post treatment resolved. 3.  Hypertension controlled continue with home medication. 4.  Atrial fibrillation rate is controlled on chronic anticoagulation with Eliquis. 5.  GERD continue the PPI. 6.  Diabetes mellitus type 2 continue with current care hemoglobin A1c= 7.8 on 8/21/2020.  7.  Hypothyroidism on Synthroid. 8.  Dementia without behavioral disturbances controlled on home medication  9.   Obesity BMI 40.10    DVT Prophylaxis: Eliquis  Diet: DIET CARB CONTROL;  Code Status: Full Code    PT/OT Eval Status: Consulted    Dispo -nursing home resident    Nikki Rodriguez MD

## 2020-08-23 NOTE — PROGRESS NOTES
Occupational Therapy   Occupational Therapy Initial Assessment and Treatment Note  Date: 2020   Patient Name: Laroy Cushing  MRN: [id number]     : 1950    Date of Service: 2020    Discharge Recommendations:  SNF (Pt requesting to return to [facility])  OT Equipment Recommendations  Equipment Needed: No    Assessment   Performance deficits / Impairments: Decreased functional mobility ; Decreased endurance;Decreased ADL status; Decreased balance;Decreased strength;Decreased safe awareness;Decreased cognition  Assessment: OT eval complete. Pt presents with the above deficits impacting independence with ADLs, t/fs and mobility. Pt requiring CGA t/fs and mobility this date with RW and VCs for safety awareness. Pt requesting rest break upon ambulating x20ft with RW. Pt required Min A to don brief, assist required to thread LLE, pt able to perform above waist clothing management with increased time. Pt requesting to return to [facility], pt agreeable to receiving OT upon return to LTC facility. Cont POC  Prognosis: Good  Decision Making: Medium Complexity  OT Education: OT Role;Plan of Care;Equipment;ADL Adaptive Strategies;Transfer Training;Energy Conservation  REQUIRES OT FOLLOW UP: Yes  Activity Tolerance  Activity Tolerance: Patient Tolerated treatment well  Safety Devices  Safety Devices in place: Yes  Type of devices: All fall risk precautions in place; Left in chair;Call light within reach;Nurse notified;Gait belt; Chair alarm in place  Restraints  Initially in place: No           Patient Diagnosis(es): The primary encounter diagnosis was Hyperkalemia. Diagnoses of Bradycardia, DIONNE (acute kidney injury) ([address] Chronic anticoagulation, and Longstanding persistent atrial fibrillation were also pertinent to this visit.      has a past medical history of Anxiety, Arthritis, Atrial fibrillation ([address] CAD (coronary artery disease), CHF (congestive heart failure) ([address] Chronic pain syndrome, Cirrhosis ([facility] Depression, Diabetes mellitus ([facility] GERD (gastroesophageal reflux disease), Hyperlipidemia, Hypertension, Kidney disease, Thyroid disease, and Vitamin D deficiency. has no past surgical history on file.            Restrictions  Restrictions/Precautions  Restrictions/Precautions: General Precautions, Fall Risk  Required Braces or Orthoses?: No  Position Activity Restriction  Other position/activity restrictions: up in chair, IV    Subjective   General  Chart Reviewed: Yes  Patient assessed for rehabilitation services?: Yes  Family / Caregiver Present: No  Diagnosis: DIONNE  Subjective  Subjective: Pt supine in bed upon arrival and agreeable to OT session  General Comment  Comments: RN approval prior to session  Patient Currently in Pain: Denies  Vital Signs  Temp: 98.8 °F (37.1 °C)  Temp Source: Oral  Pulse: 68  Heart Rate Source: Monitor  Resp: 16  BP: (!) 149/54  BP Location: Right Arm  BP Upper/Lower: Upper  MAP (mmHg): 85  Patient Currently in Pain: Denies  Oxygen Therapy  SpO2: 95 %  O2 Device: None (Room air)  Social/Functional History  Social/Functional History  Lives With: Alone  Type of Home: Assisted living([facility])  Home Layout: One level  Home Access: Level entry  Bathroom Shower/Tub: Walk-in shower, Shower chair with back  Bathroom Toilet: Standard  Bathroom Equipment: Grab bars in shower, Grab bars around toilet  Bathroom Accessibility: Accessible  Home Equipment: Rolling walker, Wheelchair-manual, Cane, Grab bars  ADL Assistance: Needs assistance  Homemaking Responsibilities: No  Ambulation Assistance: Independent(With RW)  Transfer Assistance: Independent  Additional Comments: Pt reports recent history of falls, pt is questionable historian       Objective   Vision: Impaired  Vision Exceptions: Wears glasses at all times  Hearing: Within functional limits    Orientation  Overall Orientation Status: Within Functional Limits     Balance  Sitting Balance: Supervision  Standing Balance: Contact guard assistance  Standing Balance  Time: x30 sec x2, x1 min  Activity: murray dimas, mobility in room  Comment: CGA RW  Functional Mobility  Functional - Mobility Device: Rolling Walker  Activity: Other  Assist Level: Contact guard assistance  ADL  Feeding: Independent  LE Dressing: Minimal assistance(to louise peguero)  Tone RUE  RUE Tone: Normotonic  Tone LUE  LUE Tone: Normotonic  Coordination  Movements Are Fluid And Coordinated: Yes     Bed mobility  Sit to Supine: Stand by assistance  Scooting: Stand by assistance(EOB)  Comment: HOB elevated, side rails used  Transfers  Sit to stand: Contact guard assistance  Stand to sit: Contact guard assistance  Transfer Comments: RW     Cognition  Overall Cognitive Status: Exceptions  Arousal/Alertness: Appropriate responses to stimuli  Following Commands: Follows one step commands with increased time; Follows one step commands with repetition  Attention Span: Difficulty attending to directions  Memory: Appears intact  Safety Judgement: Decreased awareness of need for assistance  Problem Solving: Assistance required to generate solutions  Insights: Decreased awareness of deficits  Initiation: Requires cues for some  Sequencing: Requires cues for some        Sensation  Overall Sensation Status: WFL        LUE AROM (degrees)  LUE AROM : WFL  RUE AROM (degrees)  RUE AROM : WFL  LUE Strength  Gross LUE Strength: WFL  L Hand General: 4/5  RUE Strength  Gross RUE Strength: WFL  R Hand General: 4/5     Hand Dominance  Hand Dominance: Right             Plan   Plan  Times per week: 3-5x/wk  Current Treatment Recommendations: Strengthening, Patient/Caregiver Education & Training, Equipment Evaluation, Education, & procurement, Balance Training, Functional Mobility Training, Positioning, Endurance Training, Cognitive/Perceptual Training, Safety Education & Training, Self-Care / ADL      AM-PAC Score        AM-PAC Inpatient Daily Activity Raw Score: 18 (08/23/20 0858)  AM-PAC

## 2020-08-23 NOTE — PROGRESS NOTES
Physical Therapy    Facility/Department: [facility] A[facility] CARD TELEMETRY  Initial Assessment/Treatment    NAME: Rosalinda Marroquin  : 1950  MRN: [id number]    Date of Service: 2020    Discharge Recommendations:  ECF with PT   PT Equipment Recommendations  Equipment Needed: No    Assessment   Body structures, Functions, Activity limitations: Decreased functional mobility ; Decreased balance;Decreased strength;Decreased endurance  Assessment: Pt is a 77 y.o. female admitted to [facility] [place] from ECF d/t abnormal labs, noted to have DIONNE and hyperkalemia. Pt resides in ECF but reports she is typically I with bed mobility, t/f and  gait with use of RW. Pt reports recent increased BLE weakness and hx of falls. Pt is currently functioning slightly below her baseline completing bed mobility with SBA, t/f with RW and CGA and ambulating x 20' with RW with CGA. Pt will benefit from continued skilled PT in acute care setting to address above deficits. Recommend pt d/c to ECF with PT. Treatment Diagnosis: Impaired balance and gait  Specific instructions for Next Treatment: Progress mobility as tolerated, ther ex  Prognosis: Good  Decision Making: Low Complexity  PT Education: Goals; General Safety;Gait Training;PT Role;Disease Specific Education;Plan of Care; Functional Mobility Training;Precautions; Injury Prevention;Transfer Training  Patient Education: Pt verbalized understanding  Barriers to Learning: None  REQUIRES PT FOLLOW UP: Yes  Activity Tolerance  Activity Tolerance: Patient Tolerated treatment well       Patient Diagnosis(es): The primary encounter diagnosis was Hyperkalemia. Diagnoses of Bradycardia, DIONNE (acute kidney injury) ([address] Chronic anticoagulation, and Longstanding persistent atrial fibrillation were also pertinent to this visit.      has a past medical history of Anxiety, Arthritis, Atrial fibrillation ([address] CAD (coronary artery disease), CHF (congestive heart failure) ([address] Chronic pain syndrome, Cirrhosis ([address] Depression, Diabetes mellitus ([facility] GERD (gastroesophageal reflux disease), Hyperlipidemia, Hypertension, Kidney disease, Thyroid disease, and Vitamin D deficiency. has no past surgical history on file.     Restrictions  Restrictions/Precautions  Restrictions/Precautions: General Precautions, Fall Risk  Required Braces or Orthoses?: No  Position Activity Restriction  Other position/activity restrictions: up in chair, IV  Vision/Hearing  Vision: Impaired  Vision Exceptions: Wears glasses at all times  Hearing: Within functional limits     Subjective  General  Chart Reviewed: Yes  Patient assessed for rehabilitation services?: Yes  Additional Pertinent Hx: Hx dementia  Response To Previous Treatment: Not applicable  Family / Caregiver Present: No  Referring Practitioner: Dionne Jaquez MD  Referral Date : 08/23/20  Diagnosis: Hyperkalemia, DIONNE  Follows Commands: Within Functional Limits  General Comment  Comments: RN cleared pt for session  Subjective  Subjective: Pt resting in bed on approach, pleasant and agreeable to PT tx with no c/o pain  Pain Screening  Patient Currently in Pain: Denies  Vital Signs  Patient Currently in Pain: Denies       Orientation  Orientation  Overall Orientation Status: Within Normal Limits  Social/Functional History  Social/Functional History  Lives With: Alone  Type of Home: Facility([facility])  Home Layout: One level  Home Access: Level entry  Bathroom Shower/Tub: Walk-in shower, Shower chair with back  Bathroom Toilet: Standard  Bathroom Equipment: Grab bars in shower, Grab bars around toilet  Bathroom Accessibility: Accessible  Home Equipment: Rolling walker, Wheelchair-manual, Cane, Grab bars  ADL Assistance: Needs assistance  Homemaking Responsibilities: No  Ambulation Assistance: Independent(With RW)  Transfer Assistance: Independent  Additional Comments: Pt reports recent history of falls, pt is questionable historian  Cognition   Cognition  Overall Cognitive Status: Training, Balance Training, Patient/Caregiver Education & Training, Functional Mobility Training, Transfer Training, Gait Training, ROM  Safety Devices  Type of devices: All fall risk precautions in place, Left in chair, Call light within reach, Chair alarm in place, Nurse notified, Gait belt, Patient at risk for falls  Restraints  Initially in place: No    AM-PAC Score     AM-PAC Inpatient Mobility without Stair Climbing Raw Score : 17 (08/23/20 0948)  AM-PAC Inpatient without Stair Climbing T-Scale Score : 48.47 (08/23/20 0948)  Mobility Inpatient CMS 0-100% Score: 32.72 (08/23/20 0948)  Mobility Inpatient without Stair CMS G-Code Modifier : Lowella Declan (08/23/20 8293)       Goals  Short term goals  Time Frame for Short term goals: 1 week 8/30/20 (unless otherwise specified)  Short term goal 1: Pt will complete supine to/from sit with I  Short term goal 2: Pt will complete sit to/from stand with RW with S  Short term goal 3: Pt will ambulate x 48' with RW with S without LOB  Short term goal 4: 8/26/20: Pt will participate in 12-15 reps BLE exercises to increase strength and increase I with functional mobility  Patient Goals   Patient goals : \"Go back [employer]"       Therapy Time   Individual Concurrent Group Co-treatment   Time In 0803         Time Out 0832         Minutes 29         Timed Code Treatment Minutes: 19 Minutes(10 minutes for eval)     If pt is unable to be seen after this session, please let this note serve as discharge summary. Please see case management note for discharge disposition. Thank you.     Celio Weaver, PT, DPT

## 2020-08-23 NOTE — PROGRESS NOTES
[facility]    [facility]              [phone]                    Plan :     Creatinines better to 0.9    Discontinue IV fluid    Replace IV magnesium. Increase amlodipine to 10 mg once a day    Assessment :     Hyperkalemia  Potassium 6.8 on admission-down to 4.9  Treated medically  Repeat    Acidosis  On bicarbonate drip. Acute Kidney Injury on CKD III  DIONNE likely due to -urinary tract infection, also has cirrhosis of the liver with decompensation  Cr on consultation 1.7 down from 2.6 on admission  Baseline Cr-1.5 from 4/9  She is followed by Dr. Davon Fields follow-up was 5/19    UA-leukocyte esterase, WBC present, otherwise bland  Renal Imaging:- pending  Echo: 11/18-EF normal moderate TR    Hypertension   BP: (150)/(72)  Pulse:  [76]   BP goal inpatient [id number] systolic inpatient  Known to have atrial fibrillation    Cirrhosis of the liver-with portal  Diabetes mellitus on insulin  Hyperuricemia    Pedal Edema  Watch for now      [facility] would like to thank Keely Toney MD   for opportunity to serve this patient      Please call with questions at-   24 Hrs Answering service [phone] or  7 am- 5 pm via Perfect serve or cell phone  Jojo Maldonado          CC/reason for consult :     hyperkalemia     HPI :     Gael Quispe is a 77 y.o. female presented to   the hospital on 8/20/2020 with acute renal failure and hyperkalemia. She had lab on on the day of admission which showed potassium around 6.9 with acute kidney injury because of which she was sent to the emergency room   She was also bradycardic which improved with atropine. For high potassium she was treated medically.   Her heart rate was 30s to 40s on presentation     Interval History:     Urine output is not measured  Blood pressure is uncontrolled    ROS:     Seen with- no family in the room             PMH/PSH/SH/Family History:     Past Medical History:   Diagnosis Date    Anxiety     Arthritis     [facility]rial fibrillation ([facility] [address]     CAD (coronary artery disease)     CHF (congestive heart failure) (HCC)     Chronic pain syndrome     [facility]     Depression     Diabetes mellitus (HCC)     GERD (gastroesophageal reflux disease)     Hyperlipidemia     Hypertension     Kidney disease     Thyroid disease     Vitamin D deficiency        History reviewed. No pertinent surgical history. reports that she has never smoked. She has never used smokeless tobacco. She reports that she does not drink alcohol or use drugs. family history is not on file.          Medication:     Current Facility-Administered Medications: sodium bicarbonate 75 mEq in sodium chloride 0.45 % 1,000 mL infusion, , Intravenous, Continuous  amLODIPine (NORVASC) tablet 5 mg, 5 mg, Oral, Daily  cefTRIAXone (ROCEPHIN) 1 g IVPB in 50 mL D5W minibag, 1 g, Intravenous, Q24H  apixaban (ELIQUIS) tablet 2.5 mg, 2.5 mg, Oral, BID  atorvastatin (LIPITOR) tablet 20 mg, 20 mg, Oral, Nightly  levothyroxine (SYNTHROID) tablet 125 mcg, 125 mcg, Oral, Daily  pantoprazole (PROTONIX) tablet 40 mg, 40 mg, Oral, QAM AC  sertraline (ZOLOFT) tablet 125 mg, 125 mg, Oral, Nightly  sodium chloride flush 0.9 % injection 10 mL, 10 mL, Intravenous, PRN  acetaminophen (TYLENOL) tablet 650 mg, 650 mg, Oral, Q6H PRN **OR** acetaminophen (TYLENOL) suppository 650 mg, 650 mg, Rectal, Q6H PRN  promethazine (PHENERGAN) tablet 12.5 mg, 12.5 mg, Oral, Q6H PRN **OR** ondansetron (ZOFRAN) injection 4 mg, 4 mg, Intravenous, Q6H PRN  glucose (GLUTOSE) 40 % oral gel 15 g, 15 g, Oral, PRN  dextrose 50 % IV solution, 12.5 g, Intravenous, PRN  glucagon (rDNA) injection 1 mg, 1 mg, Intramuscular, PRN  dextrose 5 % solution, 100 mL/hr, Intravenous, PRN  insulin glargine (LANTUS) injection vial 15 Units, 0.15 Units/kg, Subcutaneous, Nightly  insulin lispro (HUMALOG) injection vial 5 Units, 0.05 Units/kg, Subcutaneous, TID WC  insulin lispro (HUMALOG) injection vial 0-6 Units, 0-6 Units, Subcutaneous, TID WC  insulin lispro (HUMALOG) injection vial 0-3 Units, 0-3 Units, Subcutaneous, Nightly  melatonin ER tablet 2 mg, 2 mg, Oral, Nightly PRN  calcium carbonate (TUMS) chewable tablet 1,000 mg, 1,000 mg, Oral, TID PRN  atropine injection 0.5 mg, 0.5 mg, Intravenous, Q30 Min PRN       Vitals :     Vitals:    08/23/20 0436   BP: (!) 150/72   Pulse: 76   Resp: 16   Temp: 99 °F (37.2 °C)   SpO2: 96%       I & O :       Intake/Output Summary (Last 24 hours) at 8/23/2020 3343  Last data filed at 8/23/2020 6711  Gross per 24 hour   Intake --   Output 870 ml   Net -870 ml        Physical Examination :     Due to the current efforts to prevent transmission of COVID-19 and also the need to preserve PPE for other caregivers, a face-to-face encounter with the patient was not performed. That being said, all relevant records and diagnostic tests were reviewed, including laboratory results and imaging. Please reference any relevant documentation elsewhere. Care will be coordinated with the primary service.     Seen from window  Discussed with staff      LABS:     Recent Labs     08/21/20  0510 08/22/20  0645 08/23/20  0613   WBC 6.0 3.4* 4.2   HGB 11.6* 9.9* 10.9*   HCT 34.6* 29.7* 32.7*   * 84* 98*     Recent Labs     08/21/20  0510 08/21/20  1415 08/22/20  0645 08/23/20  0613    141 140 139   K 5.8* 5.3* 4.9 4.5   * 113* 113* 108   CO2 14* 14* 18* 21   BUN 80* 68* 51* 34*   CREATININE 1.7* 1.3* 1.0 0.9   GLUCOSE 122* 172* 159* 139*   MG 1.70*  --  1.40* 1.50*   PHOS  --  3.0  --   --

## 2020-08-23 NOTE — PLAN OF CARE
Problem: Falls - Risk of:  Goal: Will remain free from falls  Description: Will remain free from falls  8/23/2020 1109 by Yogesh Haywood RN  Outcome: Ongoing  8/23/2020 0449 by Zofia Degroot RN  Outcome: Ongoing   Pt to remain free of fall and injury. Bed locked and in lowest position with bed alarm on and non skid socks. Call light within reach.

## 2020-08-24 VITALS
DIASTOLIC BLOOD PRESSURE: 70 MMHG | HEIGHT: 65 IN | BODY MASS INDEX: 40.32 KG/M2 | HEART RATE: 64 BPM | SYSTOLIC BLOOD PRESSURE: 110 MMHG | TEMPERATURE: 98 F | RESPIRATION RATE: 16 BRPM | OXYGEN SATURATION: 96 % | WEIGHT: 242 LBS

## 2020-08-24 LAB
ANION GAP SERPL CALCULATED.3IONS-SCNC: 10 MMOL/L (ref 3–16)
BASOPHILS ABSOLUTE: 0 K/UL (ref 0–0.2)
BASOPHILS RELATIVE PERCENT: 0.5 %
BUN BLDV-MCNC: 27 MG/DL (ref 7–20)
CALCIUM SERPL-MCNC: 10 MG/DL (ref 8.3–10.6)
CHLORIDE BLD-SCNC: 109 MMOL/L (ref 99–110)
CO2: 22 MMOL/L (ref 21–32)
CREAT SERPL-MCNC: 0.8 MG/DL (ref 0.6–1.2)
EOSINOPHILS ABSOLUTE: 0.1 K/UL (ref 0–0.6)
EOSINOPHILS RELATIVE PERCENT: 2.4 %
GFR AFRICAN AMERICAN: >60
GFR NON-AFRICAN AMERICAN: >60
GLUCOSE BLD-MCNC: 106 MG/DL (ref 70–99)
GLUCOSE BLD-MCNC: 117 MG/DL (ref 70–99)
GLUCOSE BLD-MCNC: 150 MG/DL (ref 70–99)
HCT VFR BLD CALC: 33.4 % (ref 36–48)
HEMOGLOBIN: 11.1 G/DL (ref 12–16)
LYMPHOCYTES ABSOLUTE: 0.6 K/UL (ref 1–5.1)
LYMPHOCYTES RELATIVE PERCENT: 17 %
MAGNESIUM: 1.7 MG/DL (ref 1.8–2.4)
MCH RBC QN AUTO: 30.9 PG (ref 26–34)
MCHC RBC AUTO-ENTMCNC: 33.3 G/DL (ref 31–36)
MCV RBC AUTO: 92.7 FL (ref 80–100)
MONOCYTES ABSOLUTE: 0.3 K/UL (ref 0–1.3)
MONOCYTES RELATIVE PERCENT: 7.2 %
NEUTROPHILS ABSOLUTE: 2.6 K/UL (ref 1.7–7.7)
NEUTROPHILS RELATIVE PERCENT: 72.9 %
PDW BLD-RTO: 14.1 % (ref 12.4–15.4)
PERFORMED ON: ABNORMAL
PERFORMED ON: ABNORMAL
PLATELET # BLD: 100 K/UL (ref 135–450)
PMV BLD AUTO: 8.8 FL (ref 5–10.5)
POTASSIUM SERPL-SCNC: 4.6 MMOL/L (ref 3.5–5.1)
RBC # BLD: 3.61 M/UL (ref 4–5.2)
SODIUM BLD-SCNC: 141 MMOL/L (ref 136–145)
WBC # BLD: 3.5 K/UL (ref 4–11)

## 2020-08-24 PROCEDURE — 6370000000 HC RX 637 (ALT 250 FOR IP): Performed by: INTERNAL MEDICINE

## 2020-08-24 PROCEDURE — 36415 COLL VENOUS BLD VENIPUNCTURE: CPT

## 2020-08-24 PROCEDURE — 85025 COMPLETE CBC W/AUTO DIFF WBC: CPT

## 2020-08-24 PROCEDURE — 6360000002 HC RX W HCPCS: Performed by: REGISTERED NURSE

## 2020-08-24 PROCEDURE — 80048 BASIC METABOLIC PNL TOTAL CA: CPT

## 2020-08-24 PROCEDURE — 83735 ASSAY OF MAGNESIUM: CPT

## 2020-08-24 PROCEDURE — 2580000003 HC RX 258: Performed by: INTERNAL MEDICINE

## 2020-08-24 RX ORDER — MAGNESIUM SULFATE IN WATER 40 MG/ML
2 INJECTION, SOLUTION INTRAVENOUS ONCE
Status: COMPLETED | OUTPATIENT
Start: 2020-08-24 | End: 2020-08-24

## 2020-08-24 RX ORDER — FUROSEMIDE 20 MG/1
20 TABLET ORAL 2 TIMES DAILY
Status: DISCONTINUED | OUTPATIENT
Start: 2020-08-24 | End: 2020-08-24 | Stop reason: HOSPADM

## 2020-08-24 RX ADMIN — INSULIN LISPRO 5 UNITS: 100 INJECTION, SOLUTION INTRAVENOUS; SUBCUTANEOUS at 08:46

## 2020-08-24 RX ADMIN — AMLODIPINE BESYLATE 10 MG: 5 TABLET ORAL at 09:46

## 2020-08-24 RX ADMIN — INSULIN LISPRO 1 UNITS: 100 INJECTION, SOLUTION INTRAVENOUS; SUBCUTANEOUS at 12:36

## 2020-08-24 RX ADMIN — Medication 10 ML: at 09:45

## 2020-08-24 RX ADMIN — APIXABAN 2.5 MG: 2.5 TABLET, FILM COATED ORAL at 09:45

## 2020-08-24 RX ADMIN — PANTOPRAZOLE SODIUM 40 MG: 40 TABLET, DELAYED RELEASE ORAL at 09:57

## 2020-08-24 RX ADMIN — MAGNESIUM SULFATE HEPTAHYDRATE 2 G: 40 INJECTION, SOLUTION INTRAVENOUS at 09:58

## 2020-08-24 RX ADMIN — ACETAMINOPHEN 650 MG: 325 TABLET ORAL at 10:48

## 2020-08-24 RX ADMIN — INSULIN LISPRO 5 UNITS: 100 INJECTION, SOLUTION INTRAVENOUS; SUBCUTANEOUS at 12:36

## 2020-08-24 RX ADMIN — LEVOTHYROXINE SODIUM 125 MCG: 0.12 TABLET ORAL at 10:49

## 2020-08-24 ASSESSMENT — PAIN SCALES - GENERAL
PAINLEVEL_OUTOF10: 1
PAINLEVEL_OUTOF10: 0
PAINLEVEL_OUTOF10: 0
PAINLEVEL_OUTOF10: 3
PAINLEVEL_OUTOF10: 3

## 2020-08-24 ASSESSMENT — PAIN DESCRIPTION - LOCATION: LOCATION: BACK

## 2020-08-24 ASSESSMENT — PAIN DESCRIPTION - ORIENTATION: ORIENTATION: LOWER

## 2020-08-24 NOTE — PROGRESS NOTES
[facility]    [facility]              [phone]                    Plan :     Renal function is stable  DC fluids  Edematous  Resume lasix po 20 mg bid- will help BP also  No potassium and aldactone due to recent severe hyperkalemia    If DCed will follow up in office  Asked [facility] to fax results to me  Cr is 0.9- may show true no once back on diuretics    Assessment :     Hyperkalemia  Potassium 6.8 on admission-   Treated medically  Repeat    Acidosis  Needed bicarb drip  CO2 13 on admssion    Acute Kidney Injury on CKD III  DIONNE likely due to -urinary tract infection, also has cirrhosis of the liver with decompensation  Cr on consultation 1.7 down from 2.6 on admission  Baseline Cr-1.5 from 4/9  She is followed by Dr. Mackenzie Mccormack follow-up was 5/19    UA-leukocyte esterase, WBC present, otherwise bland  Renal Imaging:- 8/20- no hydronephrosis, no sig abnormality  Echo: 11/18-EF normal moderate TR    Hypertension   BP: (149-188)/(64-79)  Pulse:  [66-69]   BP goal inpatient [phone] systolic inpatient  Known to have atrial fibrillation    Cirrhosis of the liver-with portal  Diabetes mellitus on insulin  Hyperuricemia    Pedal Edema  Getting worse  Resume diuretics      [facility] would like to thank Ad Metz MD   for opportunity to serve this patient      Please call with questions at-   24 Hrs Answering service [phone] or  7 am- 5 pm via Perfect serve or cell phone  Dr.Sudhir Neema Bajwa          CC/reason for consult :     hyperkalemia     HPI :   From consult note-   Campos Riojas is a 77 y.o. female presented to   the hospital on 8/20/2020 with acute renal failure and hyperkalemia. She had lab on on the day of admission which showed potassium around 6.9 with acute kidney injury because of which she was sent to the emergency room   She was also bradycardic which improved with atropine. For high potassium she was treated medically.   Her heart rate was 30s to 40s on presentation     Interval History:     BP high  edematous    ROS:     Seen with- no family in the room  Feels edemtous          Medication:     Current Facility-Administered Medications: magnesium sulfate 2 g in 50 mL IVPB premix, 2 g, Intravenous, Once  amLODIPine (NORVASC) tablet 10 mg, 10 mg, Oral, Daily  apixaban (ELIQUIS) tablet 2.5 mg, 2.5 mg, Oral, BID  atorvastatin (LIPITOR) tablet 20 mg, 20 mg, Oral, Nightly  levothyroxine (SYNTHROID) tablet 125 mcg, 125 mcg, Oral, Daily  pantoprazole (PROTONIX) tablet 40 mg, 40 mg, Oral, QAM AC  sertraline (ZOLOFT) tablet 125 mg, 125 mg, Oral, Nightly  sodium chloride flush 0.9 % injection 10 mL, 10 mL, Intravenous, PRN  acetaminophen (TYLENOL) tablet 650 mg, 650 mg, Oral, Q6H PRN **OR** acetaminophen (TYLENOL) suppository 650 mg, 650 mg, Rectal, Q6H PRN  promethazine (PHENERGAN) tablet 12.5 mg, 12.5 mg, Oral, Q6H PRN **OR** ondansetron (ZOFRAN) injection 4 mg, 4 mg, Intravenous, Q6H PRN  glucose (GLUTOSE) 40 % oral gel 15 g, 15 g, Oral, PRN  dextrose 50 % IV solution, 12.5 g, Intravenous, PRN  glucagon (rDNA) injection 1 mg, 1 mg, Intramuscular, PRN  dextrose 5 % solution, 100 mL/hr, Intravenous, PRN  insulin glargine (LANTUS) injection vial 15 Units, 0.15 Units/kg, Subcutaneous, Nightly  insulin lispro (HUMALOG) injection vial 5 Units, 0.05 Units/kg, Subcutaneous, TID WC  insulin lispro (HUMALOG) injection vial 0-6 Units, 0-6 Units, Subcutaneous, TID WC  insulin lispro (HUMALOG) injection vial 0-3 Units, 0-3 Units, Subcutaneous, Nightly  melatonin ER tablet 2 mg, 2 mg, Oral, Nightly PRN  calcium carbonate (TUMS) chewable tablet 1,000 mg, 1,000 mg, Oral, TID PRN  atropine injection 0.5 mg, 0.5 mg, Intravenous, Q30 Min PRN       Vitals :     Vitals:    08/24/20 0802   BP: (!) 188/64   Pulse: 66   Resp: 16   Temp: 98.1 °F (36.7 °C)   SpO2: 97%       I & O :       Intake/Output Summary (Last 24 hours) at 8/24/2020 0912  Last data filed at 8/24/2020 0800  Gross per 24 hour Intake 840 ml   Output 1075 ml   Net -235 ml        Physical Examination :     General appearance: Anxious- no, distressed- no, in good spirits- yes,   Comfortable, communicative  AAO X 3  HEENT: Lips- normal, teeth- ok , oral mucosa- moist  Neck : Mass- no, appears symmetrical, JVD- not visible  Respiratory: Respiratory effort-  normal, wheeze- no, crackles - no, Oxygen- no  Cardiovascular:  Ausculation- No M/R/G, Edema 1 + and knuckles edema  Abdomen: visible mass- no, distention- no, scar- no, tenderness- no                            hepatosplenomegaly-  no  Musculoskeletal:  clubbing no,cyanosis- no , digital ischemia- no                           muscle strength- grossly normal , tone - grossly normal  Skin: rashes- no , ulcers- no, induration- no, tightening - no  Psychiatric:  Judgement and insight- normal           LABS:     Recent Labs     08/22/20  0645 08/23/20  0613 08/24/20  0815   WBC 3.4* 4.2 3.5*   HGB 9.9* 10.9* 11.1*   HCT 29.7* 32.7* 33.4*   PLT 84* 98* 100*     Recent Labs     08/21/20  1415 08/22/20  0645 08/23/20  0613 08/24/20  0815    140 139 141   K 5.3* 4.9 4.5 4.6   * 113* 108 109   CO2 14* 18* 21 22   BUN 68* 51* 34* 27*   CREATININE 1.3* 1.0 0.9 0.8   GLUCOSE 172* 159* 139* 117*   MG  --  1.40* 1.50* 1.70*   PHOS 3.0  --   --   --

## 2020-08-24 NOTE — DISCHARGE SUMMARY
Hospital Medicine Discharge Summary    Patient ID: Sandro Carias      Patient's PCP: [facility], MD    Admit Date: 8/20/2020     Discharge Date:   8/24/2020    Admitting Physician: Curtis Serna MD     Discharge Physician: DANIELLE Cooney - CNP     Discharge Diagnoses: Active Hospital Problems    Diagnosis    Hyperkalemia [E87.5]    Acute kidney injury superimposed on chronic kidney disease ([address] [N17.9, N18.9]    Stage 3 chronic kidney disease ([address] [N18.3]    Atrial fibrillation with slow ventricular response (HCC) [I48.91]    DIONNE (acute kidney [facility]) ([facility] [N17.9]    Type 2 diabetes mellitus, with long-term current use of insulin (HCC) [E11.9, Z79.4]    Essential hypertension [I10]    GERD (gastroesophageal reflux disease) [K21.9]    Dementia (HCC) [F03.90]    Acquired hypothyroidism [E03.9]       The patient was seen and examined on day of discharge and this discharge summary is in conjunction with any daily progress note from day of discharge. Hospital Course:  Sandro Carias is a 77year old female admitted to [facility] due to abnormal labs. Pt notes to have hyperkalemia, mild hyponatremia, DIONNE on CKD. Acute kidney injury (resolved) on chronic kidney disease stage 3:  - Cr was 2.6 on admission. Baseline Cr ~1.5. Cr is currently stable at 0.8.  - Unclear etiology, possibly due to cirrhosis of the liver with decompensation.   - Nephrology consulted. Pt treated with bicarb infusion due to metabolic acidosis. Lasix 20 mg BID resumed at UT. Pt to follow-up with Nephrology as an outpt. - Renal US showed no hydronephrosis. - Urine culture shows no growth. Empiric abx were discontinued. Hyperkalemia (resolved):  - Likely secondary to above. Medically treated. Hypertension:  - Sub-optimal control. Continue her home amlodipine. Lasix resumed as noted above. Atrial fibrillation, rate controlled:  - Continue chronic anticoagulation with Eliquis.   - It appears that she is on no rate controlled meds. Diabetes mellitus type 2:  - Sub-optimally controlled. A1C is 7.8.  - Basal bolus insulin regimen while inpt. Resume home regimen at dc. - Carb-control diet. Hypothyroidism:  - Pt is clinically euthyroid. Continue home dose of levothyroxine. Dementia without behavioral disturbances:  - Continue supportive care. Morbid obesity:  - With Body mass index is 40.27 kg/m². Complicating assessment and treatment. Placing patient at risk for multiple co-morbidities as well as early death and contributing to the patient's presentation. Counseled on weight loss. Physical Exam Performed:     BP (!) 188/64   Pulse 66   Temp 98.1 °F (36.7 °C) (Oral)   Resp 16   Ht 5' 5\" (1.651 m)   Wt 242 lb (109.8 kg)   SpO2 97%   BMI 40.27 kg/m²       General appearance: Obese female in no apparent distress, appears stated age and cooperative. HEENT:  Normal cephalic, atraumatic without obvious deformity. Pupils equal, round, and reactive to light. Extra ocular muscles intact. Conjunctivae/corneas clear. Neck: Supple, with full range of motion. No jugular venous distention. Trachea midline. Respiratory:  Normal respiratory effort. Clear to auscultation, bilaterally without Rales/Wheezes/Rhonchi. Cardiovascular:  Regular rate and rhythm with normal S1/S2 without murmurs, rubs or gallops. Abdomen: Soft, non-tender, non-distended with normal bowel sounds. Musculoskeletal:  No clubbing, cyanosis or edema bilaterally. Full range of motion without deformity. Skin: Skin color, texture, turgor normal.  No rashes or lesions. Neurologic:  Neurovascularly intact without any focal sensory/motor deficits. Cranial nerves: II-XII intact, grossly non-focal.  Psychiatric:  Alert and oriented, thought content appropriate, normal insight  Capillary Refill: Brisk,< 3 seconds   Peripheral Pulses: +2 palpable, equal bilaterally       Labs:  For convenience and continuity at follow-up the following most recent labs are provided:      CBC:    Lab Results   Component Value Date    WBC 3.5 08/24/2020    HGB 11.1 08/24/2020    HCT 33.4 08/24/2020     08/24/2020       Renal:    Lab Results   Component Value Date     08/24/2020    K 4.6 08/24/2020    K 6.8 08/20/2020     08/24/2020    CO2 22 08/24/2020    BUN 27 08/24/2020    CREATININE 0.8 08/24/2020    CALCIUM 10.0 08/24/2020    PHOS 3.0 08/21/2020         Significant Diagnostic Studies    Radiology:   US RENAL COMPLETE   Final Result   No hydronephrosis noted      Echogenic foci right kidney, either focal islands of fat or nonobstructing   renal stones         XR CHEST PORTABLE   Final Result   Bilateral pulmonary opacities favored to be due to mild edema.   Atypical   pneumonia also in the differential.                Consults:     IP CONSULT TO HOSPITALIST  IP CONSULT TO NEPHROLOGY  IP CONSULT TO SOCIAL WORK    Disposition:  [facility]    Condition at Discharge: Stable    Discharge Instructions/Follow-up:  Follow-up with Nephrology    Code Status:  Full Code     Activity: activity as tolerated    Diet: diabetic diet      Discharge Medications:     Current Discharge Medication List           Details   acetaminophen (TYLENOL) 325 MG suppository Place 650 mg rectally every 4 hours as needed for Fever      apixaban (ELIQUIS) 5 MG TABS tablet Take by mouth 2 times daily      atorvastatin (LIPITOR) 20 MG tablet Take 20 mg by mouth daily      diphenhydrAMINE (BENADRYL) 25 MG capsule Take 25 mg by mouth every 6 hours as needed for Itching      vitamin D (CHOLECALCIFEROL) 125 MCG (5000 UT) CAPS capsule Take 50,000 Units by mouth daily      fluticasone (FLONASE) 50 MCG/ACT nasal spray 1 spray by Each Nostril route daily      furosemide (LASIX) 20 MG tablet Take 20 mg by mouth 2 times daily      polyethylene glycol (MIRALAX) 17 g packet Take 17 g by mouth daily as needed for Constipation      insulin glargine (LANTUS) 100 UNIT/ML injection vial Inject into the skin nightly 24 units in am 15 units at bedtime      levothyroxine (SYNTHROID) 125 MCG tablet Take 125 mcg by mouth Daily      meclizine (ANTIVERT) 25 MG tablet Take 25 mg by mouth 3 times daily as needed      amLODIPine (NORVASC) 10 MG tablet Take 10 mg by mouth daily      insulin aspart (NOVOLOG) 100 UNIT/ML injection cartridge Inject into the skin 3 times daily (before meals) Sliding scale      ondansetron (ZOFRAN) 4 MG tablet Take 4 mg by mouth every 8 hours as needed for Nausea or Vomiting      pantoprazole (PROTONIX) 40 MG tablet Take 40 mg by mouth daily      senna-docusate (PERICOLACE) 8.6-50 MG per tablet Take 1 tablet by mouth daily      tiZANidine (ZANAFLEX) 4 MG tablet Take 4 mg by mouth every 6 hours as needed      Dulaglutide (TRULICITY) 1.5 ML/7.8HU SOPN Inject 1.5 mg into the skin once a week      sertraline (ZOLOFT) 100 MG tablet Take 125 mg by mouth nightly      Sodium Phosphates (FLEET) 7-19 GM/118ML Place 1 enema rectally once as needed      glycerin, pediatric, 1 g SUPP Place 1 suppository rectally once      magnesium hydroxide (MILK OF MAGNESIA CONCENTRATE) 2400 MG/10ML SUSP Take 2,400 mg by mouth once as needed             Time Spent on discharge is more than 45 minutes in the examination, evaluation, counseling and review of medications and discharge plan. Signed:    DANIELLE Newell CNP   8/24/2020      Thank you Bere Manuel MD for the opportunity to be involved in this patient's care. If you have any questions or concerns please feel free to contact me at [phone].

## 2020-08-24 NOTE — PROGRESS NOTES
Patient okay for discharge per MD. Discharge instructions and script given. IV removed and site assessment clean, dry, and intact. Telebox removed and CMU notified. Patient discharged home in stable conditions with all belongings including cell phone. Lock box has been emptied. No questions or concerns at this time. Patient escorted with EMT to [facility].

## 2020-12-04 ENCOUNTER — APPOINTMENT (OUTPATIENT)
Dept: CT IMAGING | Age: 70
DRG: 291 | End: 2020-12-04
Payer: COMMERCIAL

## 2020-12-04 ENCOUNTER — HOSPITAL ENCOUNTER (INPATIENT)
Age: 70
LOS: 19 days | Discharge: NURSING, REHAB OR LONG-TERM CARE | DRG: 291 | End: 2020-12-24
Attending: EMERGENCY MEDICINE | Admitting: INTERNAL MEDICINE
Payer: COMMERCIAL

## 2020-12-04 ENCOUNTER — APPOINTMENT (OUTPATIENT)
Dept: GENERAL RADIOLOGY | Age: 70
DRG: 291 | End: 2020-12-04
Payer: COMMERCIAL

## 2020-12-04 LAB
A/G RATIO: 0.9 (ref 1.1–2.2)
ALBUMIN SERPL-MCNC: 3.1 G/DL (ref 3.4–5)
ALP BLD-CCNC: 108 U/L (ref 40–129)
ALT SERPL-CCNC: 8 U/L (ref 10–40)
ANION GAP SERPL CALCULATED.3IONS-SCNC: 11 MMOL/L (ref 3–16)
AST SERPL-CCNC: 24 U/L (ref 15–37)
BASE EXCESS VENOUS: -5.6 MMOL/L (ref -3–3)
BASOPHILS ABSOLUTE: 0 K/UL (ref 0–0.2)
BASOPHILS RELATIVE PERCENT: 0.4 %
BILIRUB SERPL-MCNC: 0.4 MG/DL (ref 0–1)
BUN BLDV-MCNC: 50 MG/DL (ref 7–20)
CALCIUM SERPL-MCNC: 9.6 MG/DL (ref 8.3–10.6)
CARBOXYHEMOGLOBIN: 2.5 % (ref 0–1.5)
CHLORIDE BLD-SCNC: 106 MMOL/L (ref 99–110)
CO2: 21 MMOL/L (ref 21–32)
CREAT SERPL-MCNC: 2 MG/DL (ref 0.6–1.2)
EOSINOPHILS ABSOLUTE: 0.1 K/UL (ref 0–0.6)
EOSINOPHILS RELATIVE PERCENT: 2.6 %
GFR AFRICAN AMERICAN: 30
GFR NON-AFRICAN AMERICAN: 25
GLOBULIN: 3.4 G/DL
GLUCOSE BLD-MCNC: 133 MG/DL (ref 70–99)
HCO3 VENOUS: 20 MMOL/L (ref 23–29)
HCT VFR BLD CALC: 22.8 % (ref 36–48)
HEMOGLOBIN: 6.9 G/DL (ref 12–16)
INR BLD: 2.03 (ref 0.86–1.14)
LACTIC ACID: 0.7 MMOL/L (ref 0.4–2)
LIPASE: 15 U/L (ref 13–60)
LYMPHOCYTES ABSOLUTE: 0.4 K/UL (ref 1–5.1)
LYMPHOCYTES RELATIVE PERCENT: 9.3 %
MCH RBC QN AUTO: 26.1 PG (ref 26–34)
MCHC RBC AUTO-ENTMCNC: 30.5 G/DL (ref 31–36)
MCV RBC AUTO: 85.6 FL (ref 80–100)
METHEMOGLOBIN VENOUS: 0.6 %
MONOCYTES ABSOLUTE: 0.2 K/UL (ref 0–1.3)
MONOCYTES RELATIVE PERCENT: 6.3 %
NEUTROPHILS ABSOLUTE: 3.1 K/UL (ref 1.7–7.7)
NEUTROPHILS RELATIVE PERCENT: 81.4 %
O2 CONTENT, VEN: 10 VOL %
O2 SAT, VEN: 92 %
O2 THERAPY: ABNORMAL
PCO2, VEN: 39.6 MMHG (ref 40–50)
PDW BLD-RTO: 16.9 % (ref 12.4–15.4)
PH VENOUS: 7.32 (ref 7.35–7.45)
PLATELET # BLD: 161 K/UL (ref 135–450)
PMV BLD AUTO: 7.7 FL (ref 5–10.5)
PO2, VEN: 65.7 MMHG (ref 25–40)
POTASSIUM REFLEX MAGNESIUM: 4.4 MMOL/L (ref 3.5–5.1)
PRO-BNP: 3574 PG/ML (ref 0–124)
PROTHROMBIN TIME: 23.7 SEC (ref 10–13.2)
RBC # BLD: 2.66 M/UL (ref 4–5.2)
SARS-COV-2, NAAT: NOT DETECTED
SODIUM BLD-SCNC: 138 MMOL/L (ref 136–145)
TCO2 CALC VENOUS: 21 MMOL/L
TOTAL PROTEIN: 6.5 G/DL (ref 6.4–8.2)
TROPONIN: 0.05 NG/ML
WBC # BLD: 3.8 K/UL (ref 4–11)

## 2020-12-04 PROCEDURE — 82803 BLOOD GASES ANY COMBINATION: CPT

## 2020-12-04 PROCEDURE — 83880 ASSAY OF NATRIURETIC PEPTIDE: CPT

## 2020-12-04 PROCEDURE — U0002 COVID-19 LAB TEST NON-CDC: HCPCS

## 2020-12-04 PROCEDURE — 84484 ASSAY OF TROPONIN QUANT: CPT

## 2020-12-04 PROCEDURE — 96374 THER/PROPH/DIAG INJ IV PUSH: CPT

## 2020-12-04 PROCEDURE — 71045 X-RAY EXAM CHEST 1 VIEW: CPT

## 2020-12-04 PROCEDURE — 83690 ASSAY OF LIPASE: CPT

## 2020-12-04 PROCEDURE — 83605 ASSAY OF LACTIC ACID: CPT

## 2020-12-04 PROCEDURE — 85025 COMPLETE CBC W/AUTO DIFF WBC: CPT

## 2020-12-04 PROCEDURE — 84443 ASSAY THYROID STIM HORMONE: CPT

## 2020-12-04 PROCEDURE — 36415 COLL VENOUS BLD VENIPUNCTURE: CPT

## 2020-12-04 PROCEDURE — 86923 COMPATIBILITY TEST ELECTRIC: CPT

## 2020-12-04 PROCEDURE — 86850 RBC ANTIBODY SCREEN: CPT

## 2020-12-04 PROCEDURE — 93005 ELECTROCARDIOGRAM TRACING: CPT | Performed by: PHYSICIAN ASSISTANT

## 2020-12-04 PROCEDURE — 99285 EMERGENCY DEPT VISIT HI MDM: CPT

## 2020-12-04 PROCEDURE — 6360000002 HC RX W HCPCS: Performed by: PHYSICIAN ASSISTANT

## 2020-12-04 PROCEDURE — P9016 RBC LEUKOCYTES REDUCED: HCPCS

## 2020-12-04 PROCEDURE — 85610 PROTHROMBIN TIME: CPT

## 2020-12-04 PROCEDURE — 84439 ASSAY OF FREE THYROXINE: CPT

## 2020-12-04 PROCEDURE — 74176 CT ABD & PELVIS W/O CONTRAST: CPT

## 2020-12-04 PROCEDURE — 93005 ELECTROCARDIOGRAM TRACING: CPT | Performed by: INTERNAL MEDICINE

## 2020-12-04 PROCEDURE — 83036 HEMOGLOBIN GLYCOSYLATED A1C: CPT

## 2020-12-04 PROCEDURE — 86901 BLOOD TYPING SEROLOGIC RH(D): CPT

## 2020-12-04 PROCEDURE — 86900 BLOOD TYPING SEROLOGIC ABO: CPT

## 2020-12-04 PROCEDURE — 80053 COMPREHEN METABOLIC PANEL: CPT

## 2020-12-04 RX ORDER — SODIUM CHLORIDE 0.9 % (FLUSH) 0.9 %
10 SYRINGE (ML) INJECTION PRN
Status: DISCONTINUED | OUTPATIENT
Start: 2020-12-04 | End: 2020-12-24 | Stop reason: HOSPADM

## 2020-12-04 RX ORDER — 0.9 % SODIUM CHLORIDE 0.9 %
20 INTRAVENOUS SOLUTION INTRAVENOUS ONCE
Status: COMPLETED | OUTPATIENT
Start: 2020-12-04 | End: 2020-12-05

## 2020-12-04 RX ORDER — FUROSEMIDE 10 MG/ML
40 INJECTION INTRAMUSCULAR; INTRAVENOUS ONCE
Status: COMPLETED | OUTPATIENT
Start: 2020-12-04 | End: 2020-12-04

## 2020-12-04 RX ADMIN — FUROSEMIDE 40 MG: 10 INJECTION, SOLUTION INTRAMUSCULAR; INTRAVENOUS at 23:45

## 2020-12-04 ASSESSMENT — PAIN DESCRIPTION - LOCATION: LOCATION: SHOULDER

## 2020-12-04 ASSESSMENT — PAIN DESCRIPTION - DESCRIPTORS: DESCRIPTORS: ACHING

## 2020-12-04 ASSESSMENT — PAIN DESCRIPTION - PAIN TYPE: TYPE: CHRONIC PAIN

## 2020-12-04 ASSESSMENT — PAIN SCALES - GENERAL: PAINLEVEL_OUTOF10: 3

## 2020-12-04 ASSESSMENT — PAIN DESCRIPTION - ORIENTATION: ORIENTATION: LEFT

## 2020-12-05 PROBLEM — E87.70 FLUID OVERLOAD: Status: ACTIVE | Noted: 2020-12-05

## 2020-12-05 PROBLEM — E66.01 MORBID OBESITY (HCC): Status: ACTIVE | Noted: 2020-12-05

## 2020-12-05 LAB
ABO/RH: NORMAL
ANION GAP SERPL CALCULATED.3IONS-SCNC: 11 MMOL/L (ref 3–16)
ANTIBODY SCREEN: NORMAL
BILIRUBIN URINE: NEGATIVE
BLOOD, URINE: ABNORMAL
BUN BLDV-MCNC: 48 MG/DL (ref 7–20)
CALCIUM SERPL-MCNC: 9.8 MG/DL (ref 8.3–10.6)
CHLORIDE BLD-SCNC: 104 MMOL/L (ref 99–110)
CLARITY: CLEAR
CO2: 20 MMOL/L (ref 21–32)
COLOR: YELLOW
CREAT SERPL-MCNC: 1.8 MG/DL (ref 0.6–1.2)
EKG ATRIAL RATE: 394 BPM
EKG DIAGNOSIS: NORMAL
EKG Q-T INTERVAL: 418 MS
EKG QRS DURATION: 94 MS
EKG QTC CALCULATION (BAZETT): 466 MS
EKG R AXIS: 110 DEGREES
EKG T AXIS: -21 DEGREES
EKG VENTRICULAR RATE: 75 BPM
EPITHELIAL CELLS, UA: ABNORMAL /HPF (ref 0–5)
ESTIMATED AVERAGE GLUCOSE: 142.7 MG/DL
GFR AFRICAN AMERICAN: 34
GFR NON-AFRICAN AMERICAN: 28
GLUCOSE BLD-MCNC: 127 MG/DL (ref 70–99)
GLUCOSE BLD-MCNC: 155 MG/DL (ref 70–99)
GLUCOSE BLD-MCNC: 156 MG/DL (ref 70–99)
GLUCOSE BLD-MCNC: 177 MG/DL (ref 70–99)
GLUCOSE BLD-MCNC: 179 MG/DL (ref 70–99)
GLUCOSE URINE: NEGATIVE MG/DL
HBA1C MFR BLD: 6.6 %
HYALINE CASTS: ABNORMAL /LPF (ref 0–2)
KETONES, URINE: NEGATIVE MG/DL
LEUKOCYTE ESTERASE, URINE: NEGATIVE
MAGNESIUM: 2 MG/DL (ref 1.8–2.4)
MICROSCOPIC EXAMINATION: YES
NITRITE, URINE: NEGATIVE
PERFORMED ON: ABNORMAL
PH UA: 5 (ref 5–8)
POTASSIUM SERPL-SCNC: 3.8 MMOL/L (ref 3.5–5.1)
PROTEIN UA: NEGATIVE MG/DL
RBC UA: ABNORMAL /HPF (ref 0–4)
SODIUM BLD-SCNC: 135 MMOL/L (ref 136–145)
SPECIFIC GRAVITY UA: 1.02 (ref 1–1.03)
T4 FREE: 1.2 NG/DL (ref 0.9–1.8)
TROPONIN: 0.04 NG/ML
TSH REFLEX: 7.1 UIU/ML (ref 0.27–4.2)
URINE REFLEX TO CULTURE: ABNORMAL
URINE TYPE: ABNORMAL
UROBILINOGEN, URINE: 0.2 E.U./DL
WBC UA: ABNORMAL /HPF (ref 0–5)

## 2020-12-05 PROCEDURE — 6370000000 HC RX 637 (ALT 250 FOR IP): Performed by: INTERNAL MEDICINE

## 2020-12-05 PROCEDURE — 2580000003 HC RX 258: Performed by: PHYSICIAN ASSISTANT

## 2020-12-05 PROCEDURE — 2580000003 HC RX 258: Performed by: NURSE PRACTITIONER

## 2020-12-05 PROCEDURE — 51702 INSERT TEMP BLADDER CATH: CPT

## 2020-12-05 PROCEDURE — 83550 IRON BINDING TEST: CPT

## 2020-12-05 PROCEDURE — 6370000000 HC RX 637 (ALT 250 FOR IP): Performed by: NURSE PRACTITIONER

## 2020-12-05 PROCEDURE — 84484 ASSAY OF TROPONIN QUANT: CPT

## 2020-12-05 PROCEDURE — 83540 ASSAY OF IRON: CPT

## 2020-12-05 PROCEDURE — 6360000002 HC RX W HCPCS: Performed by: NURSE PRACTITIONER

## 2020-12-05 PROCEDURE — 36415 COLL VENOUS BLD VENIPUNCTURE: CPT

## 2020-12-05 PROCEDURE — 83735 ASSAY OF MAGNESIUM: CPT

## 2020-12-05 PROCEDURE — 36430 TRANSFUSION BLD/BLD COMPNT: CPT

## 2020-12-05 PROCEDURE — 80048 BASIC METABOLIC PNL TOTAL CA: CPT

## 2020-12-05 PROCEDURE — 93010 ELECTROCARDIOGRAM REPORT: CPT | Performed by: INTERNAL MEDICINE

## 2020-12-05 PROCEDURE — P9016 RBC LEUKOCYTES REDUCED: HCPCS

## 2020-12-05 PROCEDURE — 1200000000 HC SEMI PRIVATE

## 2020-12-05 PROCEDURE — 81001 URINALYSIS AUTO W/SCOPE: CPT

## 2020-12-05 RX ORDER — AMLODIPINE BESYLATE 5 MG/1
10 TABLET ORAL DAILY
Status: DISCONTINUED | OUTPATIENT
Start: 2020-12-05 | End: 2020-12-14

## 2020-12-05 RX ORDER — TIZANIDINE 4 MG/1
4 TABLET ORAL 2 TIMES DAILY
Status: DISCONTINUED | OUTPATIENT
Start: 2020-12-05 | End: 2020-12-24 | Stop reason: HOSPADM

## 2020-12-05 RX ORDER — ONDANSETRON 2 MG/ML
4 INJECTION INTRAMUSCULAR; INTRAVENOUS EVERY 6 HOURS PRN
Status: DISCONTINUED | OUTPATIENT
Start: 2020-12-05 | End: 2020-12-24 | Stop reason: HOSPADM

## 2020-12-05 RX ORDER — NICOTINE POLACRILEX 4 MG
15 LOZENGE BUCCAL PRN
Status: DISCONTINUED | OUTPATIENT
Start: 2020-12-05 | End: 2020-12-24 | Stop reason: HOSPADM

## 2020-12-05 RX ORDER — INSULIN GLARGINE 100 [IU]/ML
15 INJECTION, SOLUTION SUBCUTANEOUS NIGHTLY
Status: DISCONTINUED | OUTPATIENT
Start: 2020-12-05 | End: 2020-12-18

## 2020-12-05 RX ORDER — ACETAMINOPHEN 325 MG/1
650 TABLET ORAL EVERY 6 HOURS PRN
Status: DISCONTINUED | OUTPATIENT
Start: 2020-12-05 | End: 2020-12-24 | Stop reason: HOSPADM

## 2020-12-05 RX ORDER — PANTOPRAZOLE SODIUM 40 MG/1
40 TABLET, DELAYED RELEASE ORAL DAILY
Status: DISCONTINUED | OUTPATIENT
Start: 2020-12-05 | End: 2020-12-24 | Stop reason: HOSPADM

## 2020-12-05 RX ORDER — ACETAMINOPHEN 650 MG/1
650 SUPPOSITORY RECTAL EVERY 6 HOURS PRN
Status: DISCONTINUED | OUTPATIENT
Start: 2020-12-05 | End: 2020-12-24 | Stop reason: HOSPADM

## 2020-12-05 RX ORDER — DEXTROSE MONOHYDRATE 25 G/50ML
12.5 INJECTION, SOLUTION INTRAVENOUS PRN
Status: DISCONTINUED | OUTPATIENT
Start: 2020-12-05 | End: 2020-12-24 | Stop reason: HOSPADM

## 2020-12-05 RX ORDER — POLYETHYLENE GLYCOL 3350 17 G/17G
17 POWDER, FOR SOLUTION ORAL DAILY PRN
Status: DISCONTINUED | OUTPATIENT
Start: 2020-12-05 | End: 2020-12-24 | Stop reason: HOSPADM

## 2020-12-05 RX ORDER — SODIUM CHLORIDE 0.9 % (FLUSH) 0.9 %
10 SYRINGE (ML) INJECTION EVERY 12 HOURS SCHEDULED
Status: DISCONTINUED | OUTPATIENT
Start: 2020-12-05 | End: 2020-12-24 | Stop reason: HOSPADM

## 2020-12-05 RX ORDER — CARBOXYMETHYLCELLULOSE SODIUM 10 MG/ML
1 GEL OPHTHALMIC PRN
Status: DISCONTINUED | OUTPATIENT
Start: 2020-12-05 | End: 2020-12-24 | Stop reason: HOSPADM

## 2020-12-05 RX ORDER — ATORVASTATIN CALCIUM 10 MG/1
20 TABLET, FILM COATED ORAL DAILY
Status: DISCONTINUED | OUTPATIENT
Start: 2020-12-05 | End: 2020-12-24 | Stop reason: HOSPADM

## 2020-12-05 RX ORDER — PROMETHAZINE HYDROCHLORIDE 25 MG/1
12.5 TABLET ORAL EVERY 6 HOURS PRN
Status: DISCONTINUED | OUTPATIENT
Start: 2020-12-05 | End: 2020-12-24 | Stop reason: HOSPADM

## 2020-12-05 RX ORDER — DEXTROSE MONOHYDRATE 50 MG/ML
100 INJECTION, SOLUTION INTRAVENOUS PRN
Status: DISCONTINUED | OUTPATIENT
Start: 2020-12-05 | End: 2020-12-24 | Stop reason: HOSPADM

## 2020-12-05 RX ORDER — ACETAMINOPHEN 650 MG/1
650 SUPPOSITORY RECTAL EVERY 4 HOURS PRN
Status: DISCONTINUED | OUTPATIENT
Start: 2020-12-05 | End: 2020-12-05

## 2020-12-05 RX ORDER — LEVOTHYROXINE SODIUM 0.12 MG/1
125 TABLET ORAL DAILY
Status: DISCONTINUED | OUTPATIENT
Start: 2020-12-05 | End: 2020-12-24 | Stop reason: HOSPADM

## 2020-12-05 RX ORDER — SODIUM CHLORIDE 0.9 % (FLUSH) 0.9 %
10 SYRINGE (ML) INJECTION PRN
Status: DISCONTINUED | OUTPATIENT
Start: 2020-12-05 | End: 2020-12-24 | Stop reason: HOSPADM

## 2020-12-05 RX ORDER — TRAMADOL HYDROCHLORIDE 50 MG/1
25 TABLET ORAL EVERY 6 HOURS PRN
Status: DISCONTINUED | OUTPATIENT
Start: 2020-12-05 | End: 2020-12-24 | Stop reason: HOSPADM

## 2020-12-05 RX ADMIN — INSULIN GLARGINE 15 UNITS: 100 INJECTION, SOLUTION SUBCUTANEOUS at 20:30

## 2020-12-05 RX ADMIN — TIZANIDINE 4 MG: 4 TABLET ORAL at 19:36

## 2020-12-05 RX ADMIN — APIXABAN 5 MG: 5 TABLET, FILM COATED ORAL at 08:55

## 2020-12-05 RX ADMIN — LEVOTHYROXINE SODIUM 125 MCG: 0.12 TABLET ORAL at 06:41

## 2020-12-05 RX ADMIN — AMLODIPINE BESYLATE 10 MG: 5 TABLET ORAL at 08:55

## 2020-12-05 RX ADMIN — APIXABAN 5 MG: 5 TABLET, FILM COATED ORAL at 19:36

## 2020-12-05 RX ADMIN — CARBOXYMETHYLCELLULOSE SODIUM 1 DROP: 10 GEL OPHTHALMIC at 19:40

## 2020-12-05 RX ADMIN — INSULIN LISPRO 1 UNITS: 100 INJECTION, SOLUTION INTRAVENOUS; SUBCUTANEOUS at 20:30

## 2020-12-05 RX ADMIN — SERTRALINE HYDROCHLORIDE 125 MG: 50 TABLET ORAL at 19:36

## 2020-12-05 RX ADMIN — FUROSEMIDE 10 MG/HR: 10 INJECTION, SOLUTION INTRAMUSCULAR; INTRAVENOUS at 17:31

## 2020-12-05 RX ADMIN — SODIUM CHLORIDE, PRESERVATIVE FREE 10 ML: 5 INJECTION INTRAVENOUS at 08:55

## 2020-12-05 RX ADMIN — INSULIN LISPRO 2 UNITS: 100 INJECTION, SOLUTION INTRAVENOUS; SUBCUTANEOUS at 08:59

## 2020-12-05 RX ADMIN — INSULIN LISPRO 2 UNITS: 100 INJECTION, SOLUTION INTRAVENOUS; SUBCUTANEOUS at 13:15

## 2020-12-05 RX ADMIN — Medication 10 ML: at 04:42

## 2020-12-05 RX ADMIN — SODIUM CHLORIDE, PRESERVATIVE FREE 10 ML: 5 INJECTION INTRAVENOUS at 19:36

## 2020-12-05 RX ADMIN — ATORVASTATIN CALCIUM 20 MG: 10 TABLET, FILM COATED ORAL at 08:55

## 2020-12-05 RX ADMIN — INSULIN LISPRO 2 UNITS: 100 INJECTION, SOLUTION INTRAVENOUS; SUBCUTANEOUS at 17:31

## 2020-12-05 RX ADMIN — SODIUM CHLORIDE 20 ML: 9 INJECTION, SOLUTION INTRAVENOUS at 01:00

## 2020-12-05 RX ADMIN — FUROSEMIDE 10 MG/HR: 10 INJECTION, SOLUTION INTRAMUSCULAR; INTRAVENOUS at 08:46

## 2020-12-05 RX ADMIN — PANTOPRAZOLE SODIUM 40 MG: 40 TABLET, DELAYED RELEASE ORAL at 08:55

## 2020-12-05 RX ADMIN — TIZANIDINE 4 MG: 4 TABLET ORAL at 13:00

## 2020-12-05 ASSESSMENT — PAIN DESCRIPTION - PAIN TYPE
TYPE: CHRONIC PAIN
TYPE: CHRONIC PAIN

## 2020-12-05 ASSESSMENT — PAIN DESCRIPTION - LOCATION
LOCATION: ABDOMEN;LEG
LOCATION: GENERALIZED

## 2020-12-05 ASSESSMENT — PAIN SCALES - GENERAL
PAINLEVEL_OUTOF10: 2
PAINLEVEL_OUTOF10: 5

## 2020-12-05 ASSESSMENT — PAIN DESCRIPTION - DESCRIPTORS: DESCRIPTORS: BURNING;SORE

## 2020-12-05 NOTE — ED NOTES
[facility] @ 2003   Re:  Admission: 73 pound weight gain in 4 months, anasarca, ascites, hemoglobin 6.9, DIONNE on CKD  Np gurpreet @ 0006     [facility]  12/05/20 0008

## 2020-12-05 NOTE — PROGRESS NOTES
Blood transfusion complete. Lungs without rales or rhonchi, But are diminished in the bases. Tolerated transfusion well.

## 2020-12-05 NOTE — PROGRESS NOTES
4 Eyes Skin Assessment     The patient is being assess for   Admission    I agree that 2 RN's have performed a thorough Head to Toe Skin Assessment on the patient. ALL assessment sites listed below have been assessed. Areas assessed by both nurses:   [x]   Head, Face, and Ears   [x]   Shoulders, Back, and Chest, Abdomen  [x]   Arms, Elbows, and Hands   [x]   Coccyx, Sacrum, and Ischium  [x]   Legs, Feet, and Heels      Stage 1 peely skin over unblanchable redness on coccyx and rectal fissure is flakey. Numerous red and open areas in skin folds of abdomin, perineal area and behind right knee very excoriated. Heel red. **SHARE this note so that the co-signing nurse is able to place an eSignature**    Co-signer eSignature: Electronically signed by Pa Malagon on 12/5/20 at 7:49 AM EST    Does the Patient have Skin Breakdown?   Yes [facility] WOUND CARE was Initiated documentation include the Christen-wound, Wound Assessment, Measurements, Dressing Treatment, Drainage, and Color\",          Melvin Prevention initiated:  Yes   Wound Care Orders initiated:  Yes      [id number]  nurse consulted for Pressure Injury (Stage 3,4, Unstageable, DTI, NWPT, Complex wounds)and New or Established Ostomies:  Yes      Primary Nurse eSignature: Electronically signed by Nathan Solis RN on 12/5/20 at 4:56 AM EST      4

## 2020-12-05 NOTE — PROGRESS NOTES
Admitted to room 537 per stretcher from ED. Blood continues to infuse without signs of negative reaction. Bed in low locked position, Call light and belongings within reach. Water and snack given.

## 2020-12-05 NOTE — CONSULTS
Nutrition Assessment     Type and Reason for Visit: Initial, Consult    Nutrition Recommendations/Plan:   Continue 2gm Na diet  Add 2L FR per CHF  Encourage compliance and monitor ongoing education needs. Nutrition Assessment:  Pt is a 73 y/o female admitted with CHF exacerbation. Nutritionally compromised AEB Morbid obesity, Wt gain, ascites on admission. Pt currently ordered on 2gm Na diet. Recommend continue this diet with addition of 2L FR for CHF. Diet education provided. Nutrition Related Findings: redness to cocyx, hypoactive bowel sounds. +3 BLE edema. Current Nutrition Therapies:    DIET LOW SODIUM 2 GM; Anthropometric Measures:  · Height: 5' 5\" (165.1 cm)  · Current Body Wt: 356 lb (161.5 kg)   · BMI: 59.2    Nutrition Diagnosis:   · Overweight/Obese related to excessive energy intake as evidenced by BMI      Nutrition Interventions:   Food and/or Nutrient Delivery:  Modify Current Diet  Nutrition Education/Counseling:  Education initiated   Goals:  Compliance with 2gm Na diet to prevent further CHF exacerbations       Nutrition Monitoring and Evaluation:   Food/Nutrient Intake Outcomes:  Food and Nutrient Intake  Physical Signs/Symptoms Outcomes:  Fluid Status or Edema     Discharge Planning:    Continue current diet     Electronically signed by Jeannette Riojas.  Abimael Rivera RD, LD on 12/5/20 at 11:41 AM EST    Contact: [phone]

## 2020-12-05 NOTE — ED NOTES
Bed: 21  Expected date:   Expected time:   Means of arrival:   Comments:   Mari Burnett RN  12/04/20 1936

## 2020-12-05 NOTE — ED PROVIDER NOTES
Date: 12/4/2020  EXAMINATION: CT OF THE ABDOMEN AND PELVIS WITHOUT CONTRAST 12/4/2020 10:38 pm TECHNIQUE: CT of the abdomen and pelvis was performed without the administration of intravenous contrast. Multiplanar reformatted images are provided for review. Dose modulation, iterative reconstruction, and/or weight based adjustment of the mA/kV was utilized to reduce the radiation dose to as low as reasonably achievable. COMPARISON: None. HISTORY: ORDERING SYSTEM PROVIDED HISTORY: Weight gain, ascites, anasarca, history LARRY TECHNOLOGIST PROVIDED HISTORY: Reason for exam:->Weight gain, ascites, anasarca, history LARRY Additional Contrast?->None Reason for Exam: Weight gain, ascites, anasarca, history LARRY Acuity: Acute Type of Exam: Initial Relevant Medical/Surgical History: dave, bypass FINDINGS: Lower Chest: Small volume bilateral pleural effusion with pulmonary densities in the lower lungs typically seen with pulmonary edema. Organs: Hepatic morphologic features of cirrhosis. No gross hepatic lesion evident. The spleen is enlarged. Calcifications involving the liver and spleen reflect sequela from old granulomatous disease. The pancreas, adrenal glands and kidneys appear unremarkable with exception of mild bilateral renal atrophy. Status post cholecystectomy. GI/Bowel: No diffuse or focal bowel wall thickening evident. No inflammatory changes evident. No obstruction is seen. The appendix is not clearly visualized. Pelvis: Small volume ascites. Urinary bladder is decompressed by Bishop catheter. No pneumoperitoneum or adenopathy. Peritoneum/Retroperitoneum: Small volume ascites. No pneumoperitoneum. Shotty mildly enlarged right upper quadrant lymph nodes are almost certainly reactive. Bones/Soft Tissues: Diffuse anasarca. No acute osseous abnormality identified.      1. Findings of diffuse volume overload including abdominal and pelvic ascites, anasarca and findings of congestive heart failure in the lower 3 - 16    Glucose 133 (H) 70 - 99 mg/dL    BUN 50 (H) 7 - 20 mg/dL    CREATININE 2.0 (H) 0.6 - 1.2 mg/dL    GFR Non-African American 25 (A) >60    GFR  30 (A) >60    Calcium 9.6 8.3 - 10.6 mg/dL    Total Protein 6.5 6.4 - 8.2 g/dL    Alb 3.1 (L) 3.4 - 5.0 g/dL    Albumin/Globulin Ratio 0.9 (L) 1.1 - 2.2    Total Bilirubin 0.4 0.0 - 1.0 mg/dL    Alkaline Phosphatase 108 40 - 129 U/L    ALT 8 (L) 10 - 40 U/L    AST 24 15 - 37 U/L    Globulin 3.4 g/dL   Troponin   Result Value Ref Range    Troponin 0.05 (H) <0.01 ng/mL   Brain Natriuretic Peptide   Result Value Ref Range    Pro-BNP 3,574 (H) 0 - 124 pg/mL   Protime-INR   Result Value Ref Range    Protime 23.7 (H) 10.0 - 13.2 sec    INR 2.03 (H) 0.86 - 1.14   Blood Gas, Venous   Result Value Ref Range    pH, Ulysses 7.322 (L) 7.350 - 7.450    pCO2, Ulysses 39.6 (L) 40.0 - 50.0 mmHg    pO2, Ulysses 65.7 (H) 25.0 - 40.0 mmHg    HCO3, Venous 20.0 (L) 23.0 - 29.0 mmol/L    Base Excess, Ulysses -5.6 (L) -3.0 - 3.0 mmol/L    O2 Sat, Ulysses 92 Not Established %    Carboxyhemoglobin 2.5 (H) 0.0 - 1.5 %    MetHgb, Ulysses 0.6 <1.5 %    TC02 (Calc), Ulysses 21 Not Established mmol/L    O2 Content, Ulysses 10 Not Established VOL %    O2 Therapy Unknown    Lactic Acid, Plasma   Result Value Ref Range    Lactic Acid 0.7 0.4 - 2.0 mmol/L   Lipase   Result Value Ref Range    Lipase 15.0 13.0 - 60.0 U/L   COVID-19   Result Value Ref Range    SARS-CoV-2, NAAT Not Detected Not Detected   EKG 12 Lead   Result Value Ref Range    Ventricular Rate 75 BPM    Atrial Rate 394 BPM    QRS Duration 94 ms    Q-T Interval 418 ms    QTc Calculation (Bazett) 466 ms    R Axis 110 degrees    T Axis -21 degrees    Diagnosis       Atrial fibrillationLow voltage QRSIncomplete right bundle branch blockLeft posterior fascicular blockNonspecific T wave abnormality , probably digitalis effectAbnormal ECGWhen compared with ECG of 20-AUG-2020 17:28,Significant changes have occurred       For further details of Saritha

## 2020-12-05 NOTE — DISCHARGE INSTR - DIET
 Good nutrition is important when healing from an illness, injury, or surgery. Follow any nutrition recommendations given to you during your hospital stay.  If you were given an oral nutrition supplement while in the hospital, continue to take this supplement at home. You can take it with meals, in-between meals, and/or before bedtime. These supplements can be purchased at most local grocery stores, pharmacies, and chain super-stores.  If you have any questions about your diet or nutrition, call the hospital and ask for the dietitian. For nutrition questions after discharge please call the Registered Dietitian at ***-***-***. Heart Failure Nutrition Therapy  This diet will help you feel better and support your heart by reducing symptoms of fluid retention, shortness of breath and swelling. You should focus on:   Limiting sodium in your diet by reading labels and limiting foods high in sodium. o Limit your daily sodium intake to 2,000 mg per day.  o Select foods with 140 mg of sodium or less per serving. o Foods with more than 300 mg of sodium per serving may not fit into a reduced-sodium meal plan.  o Check serving sizes. If you eat more than 1 serving, you will get more sodium than the amount listed.  Limiting fluid in your diet. o Ask your doctor how much fluid you can have per day  o Remember foods that are liquid at room temperature such as popsicles, soup, ice cream and Jell-O are fluids.  Checking your weight to make sure you're not retaining too much fluid.  o Weigh yourself every morning. If you gain 3 or more pounds in one day or 5 pounds within 1 week, call your doctor. Foods to choose and avoid:   Avoid processed foods. Eat more fresh foods. o Fresh and frozen fruits and vegetables are good choices. o Choose fresh meats. Avoid processed meats such as muir, sausage and hot dogs.  Do not add salt to your food while cooking or at the table.   o Try dry or fresh herbs, pepper, lemon juice, or a sodium-free seasoning blend such as Mrs. Dash to add flavor to food. o Do not use a salt substitute.  Use caution at restaurants  o Restaurant foods are high in sodium. Ask for your food to be cooked without salt and request sauces and dressing to come on the side. 

## 2020-12-05 NOTE — FLOWSHEET NOTE
12/05/20 1422   Encounter Summary   Services provided to: Patient   Referral/Consult From: Nurse   Continue Visiting   (12/5Wants to complete AD 12/6)   Complexity of Encounter Moderate   Spiritual/Rastafari   Type Spiritual support   Assessment Approachable   Intervention Active listening;Explored feelings, thoughts, concerns;Nurtured hope;Sustaining presence/ Ministry of presence

## 2020-12-05 NOTE — PLAN OF CARE
Problem: Falls - Risk of:  Goal: Will remain free from falls  Description: Will remain free from falls  Outcome: Ongoing  Goal: Absence of physical injury  Description: Absence of physical injury  Outcome: Ongoing     Problem: Skin Integrity:  Goal: Will show no infection signs and symptoms  Description: Will show no infection signs and symptoms  Outcome: Ongoing  Goal: Absence of new skin breakdown  Description: Absence of new skin breakdown  Outcome: Ongoing     Problem: Pain:  Goal: Pain level will decrease  Description: Pain level will decrease  Outcome: Ongoing  Goal: Control of acute pain  Description: Control of acute pain  Outcome: Ongoing  Goal: Control of chronic pain  Description: Control of chronic pain  Outcome: Ongoing     Problem: OXYGENATION/RESPIRATORY FUNCTION  Goal: Patient will maintain patent airway  Outcome: Ongoing  Goal: Patient will achieve/maintain normal respiratory rate/effort  Description: Respiratory rate and effort will be within normal limits for the patient  Outcome: Ongoing     Problem: FLUID AND ELECTROLYTE IMBALANCE  Goal: Fluid and electrolyte balance are achieved/maintained  Outcome: Ongoing     Problem: ACTIVITY INTOLERANCE/IMPAIRED MOBILITY  Goal: Mobility/activity is maintained at optimum level for patient  Outcome: Ongoing

## 2020-12-05 NOTE — ED PROVIDER NOTES
[facility]  ED  EMERGENCY DEPARTMENT ENCOUNTER        Pt Name: Jay Jay Barrera  MRN: [id number]  Melvingfvirgil 1950  Date of evaluation: 12/4/2020  Provider: Debbie Barksdale PA-C  PCP: Christen Da Silva MD     I have seen and evaluated this patient with my supervising physician Stevenson Spencer Chet, [facility]       Chief Complaint   Patient presents with    Leg Swelling     From [facility]. EMS was called for SOB, pt has dyspnea upon exhertion. Pt states she has gained \"over 100 lbs in the past month since I was here\". HISTORY OF PRESENT ILLNESS   (Location, Timing/Onset, Context/Setting, Quality, Duration, Modifying Factors, Severity, Associated Signs and Symptoms)  Note limiting factors. Jay Jay Barrera is a 72 y.o. female patient presenting by EMS from [facility]. Patient presents with shortness of breath worse with movement and exertion. Patient reports weight gain of nearly 100 pounds. I do find that she has gained 73 pounds over the past 4 months. This is likely secondary to ascites and lower extremity edema considered anasarca. Patient with history of [place] as well as CKD stage III. Followed by Jackie Fields in [facility], Dr. Madelyn Bahena nephrology. I do find April 2, 2018 the patient had 8.5 L removed through paracentesis while admitted to [facility]. The patient met at this facility August 20, 2020 through August 24 of this year with hyperkalemia, DIONNE on CKD and bradycardia. Nursing Notes were all reviewed and agreed with or any disagreements were addressed in the HPI. REVIEW OF SYSTEMS    (2-9 systems for level 4, 10 or more for level 5)     Review of Systems    Positives and Pertinent negatives as per HPI. Except as noted above in the ROS, all other systems were reviewed and negative.        PAST MEDICAL HISTORY     Past Medical History:   Diagnosis Date    Anxiety     Arthritis     Atrial fibrillation ([facility]     CAD (coronary artery disease)     CHF (congestive heart failure) (HCC)     Chronic pain syndrome     Cirrhosis ([facility]     Depression     Diabetes mellitus ([facility]     GERD (gastroesophageal reflux disease)     Hyperlipidemia     Hypertension     Kidney disease     Thyroid disease     Vitamin D deficiency          SURGICAL HISTORY     Past Surgical History:   Procedure Laterality Date    BREAST REDUCTION SURGERY      CHOLECYSTECTOMY      CORONARY ARTERY BYPASS GRAFT MAZE PROCEDURE  1995    triple bypass         CURRENTMEDICATIONS       Previous Medications    ACETAMINOPHEN (TYLENOL) 325 MG SUPPOSITORY    Place 650 mg rectally every 4 hours as needed for Fever    AMLODIPINE (NORVASC) 10 MG TABLET    Take 10 mg by mouth daily    APIXABAN (ELIQUIS) 5 MG TABS TABLET    Take by mouth 2 times daily    ATORVASTATIN (LIPITOR) 20 MG TABLET    Take 20 mg by mouth daily    DIPHENHYDRAMINE (BENADRYL) 25 MG CAPSULE    Take 25 mg by mouth every 6 hours as needed for Itching    DULAGLUTIDE (TRULICITY) 1.5 IA/4.2GS SOPN    Inject 1.5 mg into the skin once a week    FLUTICASONE (FLONASE) 50 MCG/ACT NASAL SPRAY    1 spray by Each Nostril route daily    FUROSEMIDE (LASIX) 20 MG TABLET    Take 20 mg by mouth 2 times daily    GLYCERIN, PEDIATRIC, 1 G SUPP    Place 1 suppository rectally once    INSULIN ASPART (NOVOLOG) 100 UNIT/ML INJECTION CARTRIDGE    Inject into the skin 3 times daily (before meals) Sliding scale    INSULIN GLARGINE (LANTUS) 100 UNIT/ML INJECTION VIAL    Inject into the skin nightly 24 units in am 15 units at bedtime    LEVOTHYROXINE (SYNTHROID) 125 MCG TABLET    Take 125 mcg by mouth Daily    MAGNESIUM HYDROXIDE (MILK OF MAGNESIA CONCENTRATE) 2400 MG/10ML SUSP    Take 2,400 mg by mouth once as needed    MECLIZINE (ANTIVERT) 25 MG TABLET    Take 25 mg by mouth 3 times daily as needed    ONDANSETRON (ZOFRAN) 4 MG TABLET    Take 4 mg by mouth every 8 hours as needed for Nausea or Vomiting    PANTOPRAZOLE (PROTONIX) 40 MG TABLET abdominal wall induration and mild erythema. This would be consistent with anasarca. Patient is a very large abdomen likely consistent with ascites. Musculoskeletal: Normal range of motion. Right lower leg: Edema present. Left lower leg: Edema present. Comments: Patient with 4+ edema. Bilateral lower extremities   Skin:     General: Skin is warm and dry. Neurological:      General: No focal deficit present. Mental Status: She is alert and oriented to person, place, and time. Mental status is at baseline. Psychiatric:         Mood and Affect: Mood normal.         Behavior: Behavior normal.         Thought Content:  Thought content normal.         Judgment: Judgment normal.         DIAGNOSTIC RESULTS   LABS:    Labs Reviewed   CBC WITH AUTO DIFFERENTIAL - Abnormal; Notable for the following components:       Result Value    WBC 3.8 (*)     RBC 2.66 (*)     Hemoglobin 6.9 (*)     Hematocrit 22.8 (*)     MCHC 30.5 (*)     RDW 16.9 (*)     Lymphocytes Absolute 0.4 (*)     All other components within normal limits    Narrative:     Mj Zarco tel. [phone],  Hematology results called to and read back by Jaylene Gallagher RN, 12/04/2020 20:57,  by Wyatt Chinchilla  Performed at:  [address], [facility] [place]   Phone [phone]   COMPREHENSIVE METABOLIC PANEL W/ REFLEX TO MG FOR LOW K - Abnormal; Notable for the following components:    Glucose 133 (*)     BUN 50 (*)     CREATININE 2.0 (*)     GFR Non- 25 (*)     GFR  30 (*)     Alb 3.1 (*)     Albumin/Globulin Ratio 0.9 (*)     ALT 8 (*)     All other components within normal limits    Narrative:     Performed at:  [address], [facility] [place]   Phone [phone]   TROPONIN - Abnormal; Notable for the following components:    Troponin 0.05 (*)     All other components within normal limits    Narrative: Performed at:  [address], [facility] [employer]   Phone [phone] - Abnormal; Notable for the following components:    Pro-BNP 3,574 (*)     All other components within normal limits    Narrative:     Performed at:  [place] [address] [employer]   Phone [phone] - Abnormal; Notable for the following components:    Protime 23.7 (*)     INR 2.03 (*)     All other components within normal limits    Narrative:     Performed at:  [address], [facility] [employer]   Phone [phone]   BLOOD GAS, VENOUS - Abnormal; Notable for the following components:    pH, Ulysses 7.322 (*)     pCO2, Ulysses 39.6 (*)     pO2, Ulysses 65.7 (*)     HCO3, Venous 20.0 (*)     Base Excess, Ulysses -5.6 (*)     Carboxyhemoglobin 2.5 (*)     All other components within normal limits    Narrative:     Performed at:  [place] [address] [employer]   Phone [phone]   LACTIC ACID, PLASMA    Narrative:     Performed at:  [place] [address] [employer]   Phone [phone]   LIPASE    Narrative:     Performed at:  [place] [address] [employer]   Phone [phone]    Narrative:     Performed at:  [place] [address] [employer]   Phone [phone]   URINE RT REFLEX TO CULTURE   TSH WITH REFLEX   HEMOGLOBIN A1C   TYPE AND SCREEN    Narrative:     Performed at:  [place] [address] [employer]   Phone [phone]   PREPARE RBC (CROSSMATCH)       All other labs were within normal range or not returned as of this dictation. EKG:  All EKG's are interpreted by the Emergency Department Physician in the absence of a cardiologist.  Please see their note for interpretation of EKG. RADIOLOGY:   Non-plain film images such as CT, Ultrasound and MRI are read by the radiologist. Plain radiographic images are visualized and preliminarily interpreted by the ED Provider with the below findings:        Interpretation per the Radiologist below, if available at the time of this note:    CT ABDOMEN PELVIS WO CONTRAST Additional Contrast? None   Final Result   1. Findings of diffuse volume overload including abdominal and pelvic   ascites, anasarca and findings of congestive heart failure in the lower lungs. 2. Hepatic morphologic features of cirrhosis. No gross hepatic lesion   evident. 3. Splenomegaly, typically associated with portal venous hypertension but   nonspecific. 4. Mild right upper quadrant lymph node enlargement is very likely reactive. XR CHEST PORTABLE   Final Result   Vascular congestion. No acute pulmonary disease. Stable enlargement of cardiac silhouette. No results found. PROCEDURES   Unless otherwise noted below, none     Procedures    CRITICAL CARE TIME   N/A    CONSULTS:  IP CONSULT TO HOSPITALIST      EMERGENCY DEPARTMENT COURSE and DIFFERENTIAL DIAGNOSIS/MDM:   Vitals:    Vitals:    12/04/20 1935 12/04/20 2125 12/04/20 2205 12/04/20 2343   BP: (!) 115/50  (!) 110/49 (!) 115/47   Pulse: 82  72 74   Resp: 20  24 19   Temp:  98.1 °F (36.7 °C)     TempSrc:  Oral     SpO2: 96%  95% 94%   Weight: (!) 315 lb (142.9 kg)      Height: 5' 5\" (1.651 m)          Patient was given the following medications:  Medications   0.9 % sodium chloride bolus (has no administration in time range)   sodium chloride flush 0.9 % injection 10 mL (has no administration in time range)   furosemide (LASIX) injection 40 mg (40 mg Intravenous Given 12/4/20 2345)         At 9:10 PM I informed patient renal function GFR is 25 and her hemoglobin is 6.9.   We discussed placement of Bishop catheter vs a pierwick device. The patient's proBNP is 3574 and troponin is 0.05. I suspect renal origin. Chest x-ray showing vascular congestion without overt pulmonary edema. At 9:20 PM I discussed case with attending physician who recommends Lasix 40 mg IV, Bishop catheter placement, 1 unit blood. This patient does require admission for further evaluation and treatment. The patient has gained 73 pounds over the past 4 months. Patient has diffuse lower extremity edema and abdominal edema to consider anasarca. CT abdomen pelvis without IV contrast shows diffuse volume overload including abdominal pelvic structures. She does have hepatic changes consistent with cirrhosis with history of LARRY. I do find last paracentesis April, 2019 at which time they removed 8.5 L. The patient's chest x-ray shows stable cardiomegaly and with vascular congestion. No overt pulmonary edema noted. This patient is Covid negative. The patient CBC does show a hemoglobin of 6.9 this is an approximate 3 g drop over the past 4 months. The patient's renal function shows a BUN 50, creatinine 2.0 and GFR 25. Patient had discharge from facility August, 2020 had a GFR which was >60. The patient troponin is 0.05 and proBNP is 3574. I do believe the troponin is related to renal function and somewhat the BNP. ED treatment Lasix 40 mg IV and order for 1 unit blood has been placed. Case is reviewed with attending physician who recommends admission. At 12:05 AM I discussed case with hospitalist.  Patient will be admitted. Patient did present because of increasing symptoms. She does ambulate with a walker but has had increasing difficulty due to the weight gain. FINAL IMPRESSION      1. Acute kidney injury superimposed on chronic kidney disease ([address]    2. Anasarca    3. Liver cirrhosis secondary to LARRY (nonalcoholic steatohepatitis) ([address]    4.  Cirrhosis of liver with ascites, unspecified hepatic cirrhosis type ([facility]    5. Symptomatic anemia          DISPOSITION/PLAN   DISPOSITION        PATIENT REFERREDTO:  No follow-up provider specified. DISCHARGE MEDICATIONS:  New Prescriptions    No medications on file       DISCONTINUED MEDICATIONS:  Discontinued Medications    No medications on file              (Please note that portions of this note were completed with a voice recognition program.  Efforts were made to edit the dictations but occasionally words are mis-transcribed. )    Oneida Weiss PA-C (electronically signed)           Onedia Weiss PA-C  12/05/20 0008       Oneida Weiss PA-C  12/05/20 0020

## 2020-12-05 NOTE — PROGRESS NOTES
Hospitalist Progress Note      PCP: Rogelio Acuna MD    Date of Admission: 12/4/2020    Chief Complaint: Bilateral leg edema and weight gain      Subjective:  She doesn't feel any better yet. She thinks she has at least another 50 lbs to go. She may be right. She says that during a previous hospitalization elsewhere they removed 130 lbs. Medications:  Reviewed    Infusion Medications    dextrose      furosemide (LASIX) 1mg/ml infusion 10 mg/hr (12/05/20 0846)     Scheduled Medications    amLODIPine  10 mg Oral Daily    apixaban  5 mg Oral BID    atorvastatin  20 mg Oral Daily    insulin glargine  15 Units Subcutaneous Nightly    levothyroxine  125 mcg Oral Daily    pantoprazole  40 mg Oral Daily    sertraline  125 mg Oral Nightly    sodium chloride flush  10 mL Intravenous 2 times per day    insulin lispro  0-12 Units Subcutaneous TID WC    insulin lispro  0-6 Units Subcutaneous Nightly    tiZANidine  4 mg Oral BID     PRN Meds: sodium chloride flush, acetaminophen **OR** acetaminophen, polyethylene glycol, promethazine **OR** ondansetron, glucose, dextrose, glucagon (rDNA), dextrose, perflutren lipid microspheres, traMADol, sodium chloride flush      Intake/Output Summary (Last 24 hours) at 12/5/2020 1441  Last data filed at 12/5/2020 1055  Gross per 24 hour   Intake 355 ml   Output 2275 ml   Net -1920 ml       Physical Exam Performed:    /64   Pulse 76   Temp 98.5 °F (36.9 °C) (Oral)   Resp 18   Ht 5' 5\" (1.651 m)   Wt (!) 356 lb 14.8 oz (161.9 kg)   SpO2 95%   BMI 59.40 kg/m²     General appearance: No apparent distress, appears stated age and cooperative. HEENT: Pupils equal, round, and reactive to light. Conjunctivae/corneas clear. Neck: Supple, with full range of motion. No jugular venous distention. Trachea midline. Respiratory:  Normal respiratory effort. Clear to auscultation, bilaterally without Rales/Wheezes/Rhonchi.   Cardiovascular: Regular rate and rhythm with normal S1/S2 without murmurs, rubs or gallops. Abdomen: Soft, non-tender, non-distended with normal bowel sounds. Musculoskeletal: No clubbing, cyanosis. Pitting anasarca. Full range of motion without deformity. Skin: Skin color, texture, turgor normal.  No rashes or lesions. Neurologic:  Neurovascularly intact without any focal sensory/motor deficits. Cranial nerves: II-XII intact, grossly non-focal.  Psychiatric: Alert and oriented, thought content appropriate, normal insight  Capillary Refill: Brisk,< 3 seconds   Peripheral Pulses: +2 palpable, equal bilaterally       Labs:   Recent Labs     12/04/20 2018   WBC 3.8*   HGB 6.9*   HCT 22.8*        Recent Labs     12/04/20 2018 12/05/20  0340    135*   K 4.4 3.8    104   CO2 21 20*   BUN 50* 48*   CREATININE 2.0* 1.8*   CALCIUM 9.6 9.8     Recent Labs     12/04/20 2018   AST 24   ALT 8*   BILITOT 0.4   ALKPHOS 108     Recent Labs     12/04/20 2018   INR 2.03*     Recent Labs     12/04/20 2018 12/05/20  0340   TROPONINI 0.05* 0.04*       Urinalysis:      Lab Results   Component Value Date    NITRU Negative 12/05/2020    WBCUA 3-5 12/05/2020    BACTERIA 2+ 08/20/2020    RBCUA 5-10 12/05/2020    BLOODU SMALL 12/05/2020    SPECGRAV 1.025 12/05/2020    GLUCOSEU Negative 12/05/2020       Radiology:  CT ABDOMEN PELVIS WO CONTRAST Additional Contrast? None   Final Result   1. Findings of diffuse volume overload including abdominal and pelvic   ascites, anasarca and findings of congestive heart failure in the lower lungs. 2. Hepatic morphologic features of cirrhosis. No gross hepatic lesion   evident. 3. Splenomegaly, typically associated with portal venous hypertension but   nonspecific. 4. Mild right upper quadrant lymph node enlargement is very likely reactive. XR CHEST PORTABLE   Final Result   Vascular congestion. No acute pulmonary disease. Stable enlargement of cardiac silhouette. pAfib  - apixaban    DM2  - insulin regimen    Chronic normocytic anemia  - 1u pRBCs on 12/5    HLD  - statin    Morbid obesity  With Body mass index is 01.9 kg/m². Complicating assessment and treatment. Placing patient at risk for multiple co-morbidities as well as early death and contributing to the patient's presentation. Counseled on weight loss      DVT Prophylaxis: anticoagulation as above  Diet: DIET LOW SODIUM 2 GM; 2000 ml  Code Status: Full Code    PT/OT Eval Status: not indicated    Dispo - when adequately diuresed. Perhaps 12/11. She lives at [facility].        Michael Ovalle MD

## 2020-12-05 NOTE — PROGRESS NOTES
Pt in bed at the start of shift, c/o generalized pain. Medication compliant  1049: Assisted to bedside commode  with assist X2, steadying and positioning, seems to get SOB and needs rest multiple times. Bed weight obtained. Small BM. Bishop catheter in place. Lasix drip infusing at 10 gtt/hr.

## 2020-12-05 NOTE — H&P
Hospital Medicine History & Physical      PCP: Rogelio Acuna MD    Date of Admission: 12/4/2020    Date of Service: Pt seen/examined on 12/5/2020 and Admitted to Inpatient with expected LOS greater than two midnights due to medical therapy. Chief Complaint: Bilateral leg edema and weight gain    History Of Present Illness:      72 y.o. female, with past medical history of CHF, morbid obesity, diabetes, hypertension, CAD, hyperlipidemia and atrial fibrillation, who presented to [facility] with bilateral leg edema and weight gain. History obtained from the patient and review of EMR. The patient is a resident of [address] [place] [address]. She stated over the last 4 months she has gained roughly 74 pounds. She stated most of her weight is in her abdomen and bilateral legs. Patient stated she has a history of Ana Lava as well as CKD stage III. She stated she sees  from nephrology at [facility] in [facility]. Per EMR, in 2018 the patient had 8.5 L removed through paracentesis while admitted at [facility]. In the emergency room patient had a chest x-ray that revealed vascular congestion. She also had a CT abdomen pelvis that revealed findings of diffuse volume overload including abdominal and pelvic ascites, anasarca and findings consistent of congestive heart failure in the lower lungs. The patient was given 40 mg of IV Lasix in the emergency room. She will be admitted for further evaluation. Nephrology has been consulted. The patient denied any other associated symptoms as well as any aggravating and/or alleviating factors. At the time of this assessment, the patient was resting comfortably in bed. She currently denies any chest pain, back pain, abdominal pain, shortness of breath, numbness, tingling, N/V/C/D, fever and/or chills.      Past Medical History:          Diagnosis Date    Anxiety     Arthritis     [facility]     CAD (coronary artery disease)     CHF (congestive heart failure) (HCC)     Chronic pain syndrome     Cirrhosis ([facility]     Depression     Diabetes mellitus ([facility]     GERD (gastroesophageal reflux disease)     Hyperlipidemia     Hypertension     Kidney disease     Thyroid disease     Vitamin D deficiency      Past Surgical History:          Procedure Laterality Date    BREAST REDUCTION SURGERY      CHOLECYSTECTOMY      CORONARY ARTERY BYPASS GRAFT MAZE PROCEDURE  1995    triple bypass     Medications Prior to Admission:      Prior to Admission medications    Medication Sig Start Date End Date Taking?  Authorizing Provider   acetaminophen (TYLENOL) 325 MG suppository Place 650 mg rectally every 4 hours as needed for Fever    Historical Provider, MD   apixaban (ELIQUIS) 5 MG TABS tablet Take by mouth 2 times daily    Historical Provider, MD   atorvastatin (LIPITOR) 20 MG tablet Take 20 mg by mouth daily    Historical Provider, MD   diphenhydrAMINE (BENADRYL) 25 MG capsule Take 25 mg by mouth every 6 hours as needed for Itching    Historical Provider, MD   vitamin D (CHOLECALCIFEROL) 125 MCG (5000 UT) CAPS capsule Take 50,000 Units by mouth daily    Historical Provider, MD   Sodium Phosphates (FLEET) 7-19 GM/118ML Place 1 enema rectally once as needed    Historical Provider, MD   fluticasone (FLONASE) 50 MCG/ACT nasal spray 1 spray by Each Nostril route daily    Historical Provider, MD   furosemide (LASIX) 20 MG tablet Take 20 mg by mouth 2 times daily    Historical Provider, MD   glycerin pediatric, 1 g SUPP Place 1 suppository rectally once    Historical Provider, MD   polyethylene glycol (MIRALAX) 17 g packet Take 17 g by mouth daily as needed for Constipation    Historical Provider, MD   insulin glargine (LANTUS) 100 UNIT/ML injection vial Inject into the skin nightly 24 units in am 15 units at bedtime    Historical Provider, MD   levothyroxine (SYNTHROID) 125 MCG tablet Take 125 mcg by mouth Daily    Historical Provider, MD   meclizine probably digitalis effect. Abnormal ECG. When compared with ECG of 20-AUG-2020 17:28, Significant changes have occurred     CT ABDOMEN PELVIS WO CONTRAST Additional Contrast? None   Final Result   1. Findings of diffuse volume overload including abdominal and pelvic   ascites, anasarca and findings of congestive heart failure in the lower lungs. 2. Hepatic morphologic features of cirrhosis. No gross hepatic lesion   evident. 3. Splenomegaly, typically associated with portal venous hypertension but   nonspecific. 4. Mild right upper quadrant lymph node enlargement is very likely reactive. XR CHEST PORTABLE   Final Result   Vascular congestion. No acute pulmonary disease. Stable enlargement of cardiac silhouette.            ASSESSMENT:    Active Hospital Problems    Diagnosis Date Noted    Morbid obesity ([facility] [facility] [E66.01] 12/05/2020    CHF (congestive heart failure) (HCC) [I50.9]     CAD (coronary artery disease) [I25.10]     Acute kidney injury superimposed on chronic kidney disease ([facility] [address] [facility] [N17.9, N18.9] 08/20/2020    Essential hypertension [I10] 08/20/2020    Type 2 diabetes mellitus, with long-term current use of insulin (HCC) [E11.9, Z79.4] 08/20/2020    Stage 3 chronic kidney disease [N18.30] 08/20/2020     PLAN:    Bilateral leg edema and weight gain in patient with known history of CHF  -Chest x-ray revealed vascular congestion  -CT abdomen pelvis revealed findings of diffuse volume overload including abdominal and pelvic ascites, anasarca and findings of congestive heart failure in the lower lungs.  -ProBNP 3,574  -40 mg IV lasix given in ED  -mora placed in ED  -daily weights  -strict I&O  -lasix gtt initiated 12/5/2020  -tele monitoring  -BNP in am  -Echo in am    DIONNE superimposed on CKD, CR 2.0 and GFR 25 on admission  -baseline CR and GFR appears to be 1.0 and >60  -nephrology consulted - appreciate recommendations in advance  -bmp in am    Morbid obesity  With Body mass index is 52.4 kg/m². Complicating assessment and treatment. Placing patient at risk for multiple co-morbidities as well as early death and contributing to the patient's presentation. Counseled on weight loss    DM2, controlled  -  -hemglobin a1c 7.8 on 8/21/2020  -hold home medications  -continue lantus  -mdssi  -poct ac/hs  -hypoglycemia protocol  -carb control diet    Essential HTN in setting of known CAD  -continue amlodipine    HLD  -continue atorvastatin    Atrial fibrillation  -anticoagulated on eliquis     Chronic normocytic anemia, 6.9/22.8 on admission  -no s/s of bleeding at this time  -cbc in am  -1 UPRBC transfuse in ED    Elevated troponin, 0.05 on admission  -no c/o chest pain  -tele monitoring  -likely 2/2 fluid overload  -trend troponin    DVT Prophylaxis: eliquis    Diet: No diet orders on file     Code Status: Prior    PT/OT Eval Status: no indication for need at this time    Dispo - 2-3 days pending clinical improvement     Liddie Scale, APRN - CNP    Thank you Katie Roldan MD for the opportunity to be involved in this patient's care.  If you have any questions or concerns please feel free to contact me at [phone].  -------------------------------Anticipated Dr. Rissa bhardwaj------------------------------------

## 2020-12-06 PROBLEM — I50.33 ACUTE ON CHRONIC DIASTOLIC CHF (CONGESTIVE HEART FAILURE) (HCC): Status: ACTIVE | Noted: 2020-12-06

## 2020-12-06 LAB
ANION GAP SERPL CALCULATED.3IONS-SCNC: 9 MMOL/L (ref 3–16)
BUN BLDV-MCNC: 44 MG/DL (ref 7–20)
CALCIUM SERPL-MCNC: 9.8 MG/DL (ref 8.3–10.6)
CHLORIDE BLD-SCNC: 106 MMOL/L (ref 99–110)
CHOLESTEROL, TOTAL: 105 MG/DL (ref 0–199)
CO2: 25 MMOL/L (ref 21–32)
CREAT SERPL-MCNC: 1.7 MG/DL (ref 0.6–1.2)
EKG ATRIAL RATE: 394 BPM
EKG DIAGNOSIS: NORMAL
EKG Q-T INTERVAL: 418 MS
EKG QRS DURATION: 94 MS
EKG QTC CALCULATION (BAZETT): 466 MS
EKG R AXIS: 110 DEGREES
EKG T AXIS: -21 DEGREES
EKG VENTRICULAR RATE: 75 BPM
GFR AFRICAN AMERICAN: 36
GFR NON-AFRICAN AMERICAN: 30
GLUCOSE BLD-MCNC: 127 MG/DL (ref 70–99)
GLUCOSE BLD-MCNC: 148 MG/DL (ref 70–99)
GLUCOSE BLD-MCNC: 158 MG/DL (ref 70–99)
GLUCOSE BLD-MCNC: 194 MG/DL (ref 70–99)
GLUCOSE BLD-MCNC: 328 MG/DL (ref 70–99)
HCT VFR BLD CALC: 24.8 % (ref 36–48)
HDLC SERPL-MCNC: 58 MG/DL (ref 40–60)
HEMOGLOBIN: 7.8 G/DL (ref 12–16)
IRON SATURATION: 8 % (ref 15–50)
IRON: 24 UG/DL (ref 37–145)
LDL CHOLESTEROL CALCULATED: 36 MG/DL
MAGNESIUM: 1.6 MG/DL (ref 1.8–2.4)
MCH RBC QN AUTO: 26.5 PG (ref 26–34)
MCHC RBC AUTO-ENTMCNC: 31.4 G/DL (ref 31–36)
MCV RBC AUTO: 84.2 FL (ref 80–100)
PDW BLD-RTO: 16.7 % (ref 12.4–15.4)
PERFORMED ON: ABNORMAL
PLATELET # BLD: 166 K/UL (ref 135–450)
PMV BLD AUTO: 7.8 FL (ref 5–10.5)
POTASSIUM SERPL-SCNC: 4.1 MMOL/L (ref 3.5–5.1)
RBC # BLD: 2.94 M/UL (ref 4–5.2)
SODIUM BLD-SCNC: 140 MMOL/L (ref 136–145)
TOTAL IRON BINDING CAPACITY: 320 UG/DL (ref 260–445)
TRIGL SERPL-MCNC: 54 MG/DL (ref 0–150)
VLDLC SERPL CALC-MCNC: 11 MG/DL
WBC # BLD: 3.4 K/UL (ref 4–11)

## 2020-12-06 PROCEDURE — 1200000000 HC SEMI PRIVATE

## 2020-12-06 PROCEDURE — 93010 ELECTROCARDIOGRAM REPORT: CPT | Performed by: INTERNAL MEDICINE

## 2020-12-06 PROCEDURE — 6370000000 HC RX 637 (ALT 250 FOR IP): Performed by: NURSE PRACTITIONER

## 2020-12-06 PROCEDURE — 2580000003 HC RX 258: Performed by: NURSE PRACTITIONER

## 2020-12-06 PROCEDURE — 2580000003 HC RX 258: Performed by: INTERNAL MEDICINE

## 2020-12-06 PROCEDURE — 6360000002 HC RX W HCPCS: Performed by: INTERNAL MEDICINE

## 2020-12-06 PROCEDURE — 80048 BASIC METABOLIC PNL TOTAL CA: CPT

## 2020-12-06 PROCEDURE — 80061 LIPID PANEL: CPT

## 2020-12-06 PROCEDURE — 83735 ASSAY OF MAGNESIUM: CPT

## 2020-12-06 PROCEDURE — 85027 COMPLETE CBC AUTOMATED: CPT

## 2020-12-06 PROCEDURE — 51702 INSERT TEMP BLADDER CATH: CPT

## 2020-12-06 PROCEDURE — 36415 COLL VENOUS BLD VENIPUNCTURE: CPT

## 2020-12-06 PROCEDURE — 6360000002 HC RX W HCPCS: Performed by: NURSE PRACTITIONER

## 2020-12-06 PROCEDURE — 6370000000 HC RX 637 (ALT 250 FOR IP): Performed by: INTERNAL MEDICINE

## 2020-12-06 RX ORDER — FLUTICASONE PROPIONATE 50 MCG
1 SPRAY, SUSPENSION (ML) NASAL DAILY
Status: DISCONTINUED | OUTPATIENT
Start: 2020-12-06 | End: 2020-12-24 | Stop reason: HOSPADM

## 2020-12-06 RX ORDER — SPIRONOLACTONE 25 MG/1
25 TABLET ORAL DAILY
Status: DISCONTINUED | OUTPATIENT
Start: 2020-12-06 | End: 2020-12-14

## 2020-12-06 RX ADMIN — TIZANIDINE 4 MG: 4 TABLET ORAL at 21:24

## 2020-12-06 RX ADMIN — SODIUM CHLORIDE, PRESERVATIVE FREE 10 ML: 5 INJECTION INTRAVENOUS at 08:34

## 2020-12-06 RX ADMIN — SODIUM CHLORIDE, PRESERVATIVE FREE 10 ML: 5 INJECTION INTRAVENOUS at 21:46

## 2020-12-06 RX ADMIN — INSULIN LISPRO 2 UNITS: 100 INJECTION, SOLUTION INTRAVENOUS; SUBCUTANEOUS at 16:53

## 2020-12-06 RX ADMIN — FLUTICASONE PROPIONATE 1 SPRAY: 50 SPRAY, METERED NASAL at 21:25

## 2020-12-06 RX ADMIN — SPIRONOLACTONE 25 MG: 25 TABLET ORAL at 10:13

## 2020-12-06 RX ADMIN — ATORVASTATIN CALCIUM 20 MG: 10 TABLET, FILM COATED ORAL at 08:33

## 2020-12-06 RX ADMIN — INSULIN GLARGINE 15 UNITS: 100 INJECTION, SOLUTION SUBCUTANEOUS at 21:25

## 2020-12-06 RX ADMIN — PANTOPRAZOLE SODIUM 40 MG: 40 TABLET, DELAYED RELEASE ORAL at 08:33

## 2020-12-06 RX ADMIN — INSULIN LISPRO 1 UNITS: 100 INJECTION, SOLUTION INTRAVENOUS; SUBCUTANEOUS at 21:26

## 2020-12-06 RX ADMIN — SERTRALINE HYDROCHLORIDE 125 MG: 50 TABLET ORAL at 21:24

## 2020-12-06 RX ADMIN — APIXABAN 5 MG: 5 TABLET, FILM COATED ORAL at 08:33

## 2020-12-06 RX ADMIN — INSULIN LISPRO 8 UNITS: 100 INJECTION, SOLUTION INTRAVENOUS; SUBCUTANEOUS at 12:23

## 2020-12-06 RX ADMIN — FUROSEMIDE 10 MG/HR: 10 INJECTION, SOLUTION INTRAMUSCULAR; INTRAVENOUS at 04:13

## 2020-12-06 RX ADMIN — FUROSEMIDE 15 MG/HR: 10 INJECTION, SOLUTION INTRAMUSCULAR; INTRAVENOUS at 21:22

## 2020-12-06 RX ADMIN — INSULIN LISPRO 2 UNITS: 100 INJECTION, SOLUTION INTRAVENOUS; SUBCUTANEOUS at 09:48

## 2020-12-06 RX ADMIN — LEVOTHYROXINE SODIUM 125 MCG: 0.12 TABLET ORAL at 06:30

## 2020-12-06 RX ADMIN — AMLODIPINE BESYLATE 10 MG: 5 TABLET ORAL at 08:33

## 2020-12-06 RX ADMIN — APIXABAN 5 MG: 5 TABLET, FILM COATED ORAL at 21:24

## 2020-12-06 RX ADMIN — FUROSEMIDE 15 MG/HR: 10 INJECTION, SOLUTION INTRAMUSCULAR; INTRAVENOUS at 14:09

## 2020-12-06 RX ADMIN — TIZANIDINE 4 MG: 4 TABLET ORAL at 08:33

## 2020-12-06 NOTE — PLAN OF CARE
Problem: Falls - Risk of:  Goal: Will remain free from falls  Description: Will remain free from falls  Outcome: Ongoing  Goal: Absence of physical injury  Description: Absence of physical injury  Outcome: Ongoing     Problem: Skin Integrity:  Goal: Will show no infection signs and symptoms  Description: Will show no infection signs and symptoms  Outcome: Ongoing  Goal: Absence of new skin breakdown  Description: Absence of new skin breakdown  Outcome: Ongoing     Problem: Pain:  Goal: Pain level will decrease  Description: Pain level will decrease  Outcome: Ongoing  Goal: Control of acute pain  Description: Control of acute pain  Outcome: Ongoing  Goal: Control of chronic pain  Description: Control of chronic pain  Outcome: Ongoing     Problem: OXYGENATION/RESPIRATORY FUNCTION  Goal: Patient will maintain patent airway  Outcome: Ongoing  Goal: Patient will achieve/maintain normal respiratory rate/effort  Description: Respiratory rate and effort will be within normal limits for the patient  Outcome: Ongoing     Problem: HEMODYNAMIC STATUS  Goal: Patient has stable vital signs and fluid balance  Outcome: Ongoing     Problem: FLUID AND ELECTROLYTE IMBALANCE  Goal: Fluid and electrolyte balance are achieved/maintained  Outcome: Ongoing

## 2020-12-06 NOTE — FLOWSHEET NOTE
12/06/20 1143   Encounter Summary   Services provided to: Patient   Referral/Consult From: Other    Continue Visiting   (12-6, declined help with ADs)   Complexity of Encounter Moderate   Length of Encounter 45 minutes   Advance Directives (For Healthcare)   Healthcare Directive No, patient does not have an advance directive for healthcare treatment   Information on Healthcare Directives Requested Yes   Advance Directives Pt. not interested at this time    follow up on advance directives. She received forms and education previously. Patient said she is still not up for discussing and is focused on other health issues. Offered number to call when she is ready, even if that is post-discharge. PRN follow-up. No further needs at this time.

## 2020-12-07 LAB
ANION GAP SERPL CALCULATED.3IONS-SCNC: 10 MMOL/L (ref 3–16)
BUN BLDV-MCNC: 42 MG/DL (ref 7–20)
CALCIUM SERPL-MCNC: 9.2 MG/DL (ref 8.3–10.6)
CHLORIDE BLD-SCNC: 103 MMOL/L (ref 99–110)
CO2: 22 MMOL/L (ref 21–32)
CREAT SERPL-MCNC: 1.6 MG/DL (ref 0.6–1.2)
GFR AFRICAN AMERICAN: 39
GFR NON-AFRICAN AMERICAN: 32
GLUCOSE BLD-MCNC: 154 MG/DL (ref 70–99)
GLUCOSE BLD-MCNC: 172 MG/DL (ref 70–99)
GLUCOSE BLD-MCNC: 183 MG/DL (ref 70–99)
GLUCOSE BLD-MCNC: 221 MG/DL (ref 70–99)
GLUCOSE BLD-MCNC: 235 MG/DL (ref 70–99)
MAGNESIUM: 1.5 MG/DL (ref 1.8–2.4)
PERFORMED ON: ABNORMAL
POTASSIUM SERPL-SCNC: 3.7 MMOL/L (ref 3.5–5.1)
SODIUM BLD-SCNC: 135 MMOL/L (ref 136–145)

## 2020-12-07 PROCEDURE — 2580000003 HC RX 258: Performed by: NURSE PRACTITIONER

## 2020-12-07 PROCEDURE — 1200000000 HC SEMI PRIVATE

## 2020-12-07 PROCEDURE — 83735 ASSAY OF MAGNESIUM: CPT

## 2020-12-07 PROCEDURE — 80048 BASIC METABOLIC PNL TOTAL CA: CPT

## 2020-12-07 PROCEDURE — 6360000002 HC RX W HCPCS: Performed by: INTERNAL MEDICINE

## 2020-12-07 PROCEDURE — 2580000003 HC RX 258: Performed by: INTERNAL MEDICINE

## 2020-12-07 PROCEDURE — 36415 COLL VENOUS BLD VENIPUNCTURE: CPT

## 2020-12-07 PROCEDURE — 6370000000 HC RX 637 (ALT 250 FOR IP): Performed by: NURSE PRACTITIONER

## 2020-12-07 PROCEDURE — 51702 INSERT TEMP BLADDER CATH: CPT

## 2020-12-07 PROCEDURE — 6370000000 HC RX 637 (ALT 250 FOR IP): Performed by: INTERNAL MEDICINE

## 2020-12-07 PROCEDURE — C8923 2D TTE W OR W/O FOL W/CON,CO: HCPCS

## 2020-12-07 RX ADMIN — INSULIN LISPRO 4 UNITS: 100 INJECTION, SOLUTION INTRAVENOUS; SUBCUTANEOUS at 12:02

## 2020-12-07 RX ADMIN — INSULIN LISPRO 2 UNITS: 100 INJECTION, SOLUTION INTRAVENOUS; SUBCUTANEOUS at 18:41

## 2020-12-07 RX ADMIN — FUROSEMIDE 15 MG/HR: 10 INJECTION, SOLUTION INTRAMUSCULAR; INTRAVENOUS at 21:34

## 2020-12-07 RX ADMIN — FLUTICASONE PROPIONATE 1 SPRAY: 50 SPRAY, METERED NASAL at 09:10

## 2020-12-07 RX ADMIN — FUROSEMIDE 15 MG/HR: 10 INJECTION, SOLUTION INTRAMUSCULAR; INTRAVENOUS at 13:08

## 2020-12-07 RX ADMIN — INSULIN LISPRO 4 UNITS: 100 INJECTION, SOLUTION INTRAVENOUS; SUBCUTANEOUS at 09:11

## 2020-12-07 RX ADMIN — PANTOPRAZOLE SODIUM 40 MG: 40 TABLET, DELAYED RELEASE ORAL at 09:10

## 2020-12-07 RX ADMIN — ATORVASTATIN CALCIUM 20 MG: 10 TABLET, FILM COATED ORAL at 09:10

## 2020-12-07 RX ADMIN — FUROSEMIDE 15 MG/HR: 10 INJECTION, SOLUTION INTRAMUSCULAR; INTRAVENOUS at 05:34

## 2020-12-07 RX ADMIN — INSULIN LISPRO 1 UNITS: 100 INJECTION, SOLUTION INTRAVENOUS; SUBCUTANEOUS at 20:54

## 2020-12-07 RX ADMIN — APIXABAN 5 MG: 5 TABLET, FILM COATED ORAL at 20:48

## 2020-12-07 RX ADMIN — APIXABAN 5 MG: 5 TABLET, FILM COATED ORAL at 09:10

## 2020-12-07 RX ADMIN — TIZANIDINE 4 MG: 4 TABLET ORAL at 09:10

## 2020-12-07 RX ADMIN — SERTRALINE HYDROCHLORIDE 125 MG: 50 TABLET ORAL at 20:48

## 2020-12-07 RX ADMIN — INSULIN GLARGINE 15 UNITS: 100 INJECTION, SOLUTION SUBCUTANEOUS at 20:55

## 2020-12-07 RX ADMIN — TIZANIDINE 4 MG: 4 TABLET ORAL at 20:48

## 2020-12-07 RX ADMIN — SPIRONOLACTONE 25 MG: 25 TABLET ORAL at 09:10

## 2020-12-07 RX ADMIN — LEVOTHYROXINE SODIUM 125 MCG: 0.12 TABLET ORAL at 06:30

## 2020-12-07 RX ADMIN — SODIUM CHLORIDE, PRESERVATIVE FREE 10 ML: 5 INJECTION INTRAVENOUS at 09:11

## 2020-12-07 RX ADMIN — AMLODIPINE BESYLATE 10 MG: 5 TABLET ORAL at 09:10

## 2020-12-07 ASSESSMENT — PAIN SCALES - GENERAL
PAINLEVEL_OUTOF10: 4
PAINLEVEL_OUTOF10: 1
PAINLEVEL_OUTOF10: 0
PAINLEVEL_OUTOF10: 4
PAINLEVEL_OUTOF10: 1
PAINLEVEL_OUTOF10: 1
PAINLEVEL_OUTOF10: 4
PAINLEVEL_OUTOF10: 0

## 2020-12-07 ASSESSMENT — PAIN DESCRIPTION - LOCATION
LOCATION: GENERALIZED

## 2020-12-07 ASSESSMENT — PAIN DESCRIPTION - PAIN TYPE
TYPE: CHRONIC PAIN

## 2020-12-07 ASSESSMENT — PAIN DESCRIPTION - DESCRIPTORS
DESCRIPTORS: ACHING

## 2020-12-07 NOTE — PROGRESS NOTES
Hospitalist Progress Note      PCP: Mackenzie Zaragoza MD    Date of Admission: 12/4/2020    Chief Complaint: Bilateral leg edema and weight gain        Subjective:  good L UOP. was refusing standing weights. She says she feels better, breathing more easily, slept well for the first time in awhile. Awaiting labs. States she is normally 'not this fat'      Medications:  Reviewed    Infusion Medications    dextrose      furosemide (LASIX) 1mg/ml infusion 15 mg/hr (12/07/20 0534)     Scheduled Medications    spironolactone  25 mg Oral Daily    fluticasone  1 spray Each Nostril Daily    amLODIPine  10 mg Oral Daily    apixaban  5 mg Oral BID    atorvastatin  20 mg Oral Daily    insulin glargine  15 Units Subcutaneous Nightly    levothyroxine  125 mcg Oral Daily    pantoprazole  40 mg Oral Daily    sertraline  125 mg Oral Nightly    sodium chloride flush  10 mL Intravenous 2 times per day    insulin lispro  0-12 Units Subcutaneous TID WC    insulin lispro  0-6 Units Subcutaneous Nightly    tiZANidine  4 mg Oral BID     PRN Meds: sodium chloride flush, acetaminophen **OR** acetaminophen, polyethylene glycol, promethazine **OR** ondansetron, glucose, dextrose, glucagon (rDNA), dextrose, perflutren lipid microspheres, traMADol, carboxymethylcellulose PF, sodium chloride flush      Intake/Output Summary (Last 24 hours) at 12/7/2020 1211  Last data filed at 12/7/2020 1009  Gross per 24 hour   Intake 702 ml   Output 3575 ml   Net -2873 ml       Physical Exam Performed:    /62   Pulse 73   Temp 98.3 °F (36.8 °C) (Oral)   Resp 16   Ht 5' 5\" (1.651 m)   Wt (!) 361 lb 8.9 oz (164 kg)   SpO2 92%   BMI 60.17 kg/m²     General appearance: No apparent distress, appears stated age and cooperative. HEENT: Pupils equal, round, and reactive to light. Conjunctivae/corneas clear. Neck: Supple, with full range of motion. No jugular venous distention. Trachea midline.   Respiratory:  Normal respiratory effort. Clear to auscultation, bilaterally without Wheezes/Rhonchi. Faint rales. Cardiovascular: Regular rate and rhythm with normal S1/S2 without murmurs, rubs or gallops. Abdomen: Soft, non-tender, non-distended with normal bowel sounds. Musculoskeletal: No clubbing, cyanosis. Pitting anasarca. Full range of motion without deformity. Skin: Skin color, texture, turgor normal.  No rashes or lesions. Neurologic:  Neurovascularly intact without any focal sensory/motor deficits. Cranial nerves: II-XII intact, grossly non-focal.  Psychiatric: Alert and oriented, thought content appropriate, normal insight  Capillary Refill: Brisk,< 3 seconds   Peripheral Pulses: +2 palpable, equal bilaterally     Labs:   Recent Labs     12/04/20 2018 12/06/20  0931   WBC 3.8* 3.4*   HGB 6.9* 7.8*   HCT 22.8* 24.8*    166     Recent Labs     12/05/20  0340 12/06/20  0931 12/07/20  0618   * 140 135*   K 3.8 4.1 3.7    106 103   CO2 20* 25 22   BUN 48* 44* 42*   CREATININE 1.8* 1.7* 1.6*   CALCIUM 9.8 9.8 9.2     Recent Labs     12/04/20 2018   AST 24   ALT 8*   BILITOT 0.4   ALKPHOS 108     Recent Labs     12/04/20  2018   INR 2.03*     Recent Labs     12/04/20 2018 12/05/20  0340   TROPONINI 0.05* 0.04*       Urinalysis:      Lab Results   Component Value Date    NITRU Negative 12/05/2020    WBCUA 3-5 12/05/2020    BACTERIA 2+ 08/20/2020    RBCUA 5-10 12/05/2020    BLOODU SMALL 12/05/2020    SPECGRAV 1.025 12/05/2020    GLUCOSEU Negative 12/05/2020       Radiology:  CT ABDOMEN PELVIS WO CONTRAST Additional Contrast? None   Final Result   1. Findings of diffuse volume overload including abdominal and pelvic   ascites, anasarca and findings of congestive heart failure in the lower lungs. 2. Hepatic morphologic features of cirrhosis. No gross hepatic lesion   evident. 3. Splenomegaly, typically associated with portal venous hypertension but   nonspecific.    4. Mild right upper quadrant lymph node enlargement is very likely reactive. XR CHEST PORTABLE   Final Result   Vascular congestion. No acute pulmonary disease. Stable enlargement of cardiac silhouette. Assessment/Plan:    Active Hospital Problems    Diagnosis    Acute on chronic diastolic CHF (congestive heart failure) ([facility]) [I50.33]    Morbid obesity ([facility] [address] [E66.01]    Fluid overload [E87.70]    CHF (congestive heart failure) ([facility]) [I50.9]    CAD (coronary artery disease) [I25.10]    Acute kidney injury superimposed on chronic kidney disease ([facility] [address] [N17.9, N18.9]    Essential hypertension [I10]    Type 2 diabetes mellitus, with long-term current use of insulin ([facility]) [E11.9, Z79.4]    Stage 3 chronic kidney disease [N18.30]         \"70 y.o. female, with past medical history of CHF, morbid obesity, diabetes, hypertension, CAD, hyperlipidemia and atrial fibrillation, who presented to [facility] with bilateral leg edema and weight gain. The patient is a resident of [address] [place] [address]. Sravan Winter stated over the last 4 months she has gained roughly 74 pounds. Sravan Winter stated most of her weight is in her abdomen and bilateral legs.  Patient stated she has a history of Rolm Page as well as CKD stage III.  She stated she sees  from nephrology at [facility] in [facility].  Per EMR, in 2018 the patient had 8.5 L removed through paracentesis while admitted at [address] [place] [address] Ne the emergency room patient had a chest x-ray that revealed vascular congestion.  She also had a CT abdomen pelvis that revealed findings of diffuse volume overload including abdominal and pelvic ascites, anasarca and findings consistent of congestive heart failure in the lower lungs. \"         Acute on chronic diastolic CHF  - furosemide gtt (increased rate), added spironolactone. She will need to be more compliant with her outpatient diuretic regimen - she admits to refusing meds because she couldn't make it to the bathroom in time.   Staff there will need to focus on helping her to the bathroom quickly when she asks. - at the time of last discharge on 8/23/20, she weighed 242 lbs on a standing scale. I reviewed the last couple years of her admissions and her claims that she usually diureses from 360 lbs down to 190 do not seem to be accurate.    - F/u TTE.  - no ARB/ARNI due to DIONNE, consider as outpatient  - she is on amlodipine. - add metoprolol when she is closer to euvolemic     DIONNE on CKD3  - due to cardiorenal syndrome. Baseline Cr perhaps about 1.3. Monitored     pAfib  - apixaban     DM2  - insulin regimen     Chronic normocytic anemia  - 1u pRBCs on 12/5     HLD  - statin     Morbid obesity  With Body mass index is 52.4 kg/m². Complicating assessment and treatment. Placing patient at risk for multiple co-morbidities as well as early death and contributing to the patient's presentation. Counseled on weight loss        DVT Prophylaxis: anticoagulation as above  Diet: DIET LOW SODIUM 2 GM; 2000 ml  Code Status: Full Code    PT/OT Eval Status: not indicated    Dispo - when adequately diuresed. Perhaps 12/11. [facility]      Sacha Ricardo MD

## 2020-12-08 LAB
ANION GAP SERPL CALCULATED.3IONS-SCNC: 10 MMOL/L (ref 3–16)
BLOOD BANK DISPENSE STATUS: NORMAL
BLOOD BANK DISPENSE STATUS: NORMAL
BLOOD BANK PRODUCT CODE: NORMAL
BLOOD BANK PRODUCT CODE: NORMAL
BPU ID: NORMAL
BPU ID: NORMAL
BUN BLDV-MCNC: 40 MG/DL (ref 7–20)
CALCIUM SERPL-MCNC: 9.3 MG/DL (ref 8.3–10.6)
CHLORIDE BLD-SCNC: 103 MMOL/L (ref 99–110)
CO2: 26 MMOL/L (ref 21–32)
CREAT SERPL-MCNC: 1.3 MG/DL (ref 0.6–1.2)
DESCRIPTION BLOOD BANK: NORMAL
DESCRIPTION BLOOD BANK: NORMAL
GFR AFRICAN AMERICAN: 49
GFR NON-AFRICAN AMERICAN: 41
GLUCOSE BLD-MCNC: 148 MG/DL (ref 70–99)
GLUCOSE BLD-MCNC: 149 MG/DL (ref 70–99)
GLUCOSE BLD-MCNC: 179 MG/DL (ref 70–99)
GLUCOSE BLD-MCNC: 191 MG/DL (ref 70–99)
GLUCOSE BLD-MCNC: 242 MG/DL (ref 70–99)
MAGNESIUM: 1.5 MG/DL (ref 1.8–2.4)
PERFORMED ON: ABNORMAL
POTASSIUM SERPL-SCNC: 3.7 MMOL/L (ref 3.5–5.1)
SODIUM BLD-SCNC: 139 MMOL/L (ref 136–145)

## 2020-12-08 PROCEDURE — 36415 COLL VENOUS BLD VENIPUNCTURE: CPT

## 2020-12-08 PROCEDURE — 6370000000 HC RX 637 (ALT 250 FOR IP): Performed by: INTERNAL MEDICINE

## 2020-12-08 PROCEDURE — 2580000003 HC RX 258: Performed by: INTERNAL MEDICINE

## 2020-12-08 PROCEDURE — 6370000000 HC RX 637 (ALT 250 FOR IP): Performed by: NURSE PRACTITIONER

## 2020-12-08 PROCEDURE — 83735 ASSAY OF MAGNESIUM: CPT

## 2020-12-08 PROCEDURE — 80048 BASIC METABOLIC PNL TOTAL CA: CPT

## 2020-12-08 PROCEDURE — 1200000000 HC SEMI PRIVATE

## 2020-12-08 PROCEDURE — 6360000002 HC RX W HCPCS: Performed by: INTERNAL MEDICINE

## 2020-12-08 PROCEDURE — 2580000003 HC RX 258: Performed by: NURSE PRACTITIONER

## 2020-12-08 RX ORDER — MAGNESIUM SULFATE IN WATER 40 MG/ML
4 INJECTION, SOLUTION INTRAVENOUS ONCE
Status: COMPLETED | OUTPATIENT
Start: 2020-12-08 | End: 2020-12-08

## 2020-12-08 RX ADMIN — INSULIN LISPRO 2 UNITS: 100 INJECTION, SOLUTION INTRAVENOUS; SUBCUTANEOUS at 08:27

## 2020-12-08 RX ADMIN — APIXABAN 5 MG: 5 TABLET, FILM COATED ORAL at 08:25

## 2020-12-08 RX ADMIN — TIZANIDINE 4 MG: 4 TABLET ORAL at 08:25

## 2020-12-08 RX ADMIN — SPIRONOLACTONE 25 MG: 25 TABLET ORAL at 08:25

## 2020-12-08 RX ADMIN — INSULIN GLARGINE 15 UNITS: 100 INJECTION, SOLUTION SUBCUTANEOUS at 22:14

## 2020-12-08 RX ADMIN — MAGNESIUM SULFATE HEPTAHYDRATE 4 G: 40 INJECTION, SOLUTION INTRAVENOUS at 13:15

## 2020-12-08 RX ADMIN — APIXABAN 5 MG: 5 TABLET, FILM COATED ORAL at 22:12

## 2020-12-08 RX ADMIN — FUROSEMIDE 15 MG/HR: 10 INJECTION, SOLUTION INTRAMUSCULAR; INTRAVENOUS at 04:55

## 2020-12-08 RX ADMIN — INSULIN LISPRO 4 UNITS: 100 INJECTION, SOLUTION INTRAVENOUS; SUBCUTANEOUS at 12:05

## 2020-12-08 RX ADMIN — PANTOPRAZOLE SODIUM 40 MG: 40 TABLET, DELAYED RELEASE ORAL at 08:25

## 2020-12-08 RX ADMIN — INSULIN LISPRO 2 UNITS: 100 INJECTION, SOLUTION INTRAVENOUS; SUBCUTANEOUS at 17:19

## 2020-12-08 RX ADMIN — FLUTICASONE PROPIONATE 1 SPRAY: 50 SPRAY, METERED NASAL at 08:26

## 2020-12-08 RX ADMIN — SERTRALINE HYDROCHLORIDE 125 MG: 50 TABLET ORAL at 22:12

## 2020-12-08 RX ADMIN — FUROSEMIDE 15 MG/HR: 10 INJECTION, SOLUTION INTRAMUSCULAR; INTRAVENOUS at 12:05

## 2020-12-08 RX ADMIN — SODIUM CHLORIDE, PRESERVATIVE FREE 10 ML: 5 INJECTION INTRAVENOUS at 08:26

## 2020-12-08 RX ADMIN — ATORVASTATIN CALCIUM 20 MG: 10 TABLET, FILM COATED ORAL at 08:25

## 2020-12-08 RX ADMIN — AMLODIPINE BESYLATE 10 MG: 5 TABLET ORAL at 08:25

## 2020-12-08 RX ADMIN — TIZANIDINE 4 MG: 4 TABLET ORAL at 22:12

## 2020-12-08 RX ADMIN — INSULIN LISPRO 1 UNITS: 100 INJECTION, SOLUTION INTRAVENOUS; SUBCUTANEOUS at 22:13

## 2020-12-08 RX ADMIN — FUROSEMIDE 15 MG/HR: 10 INJECTION, SOLUTION INTRAMUSCULAR; INTRAVENOUS at 22:13

## 2020-12-08 RX ADMIN — LEVOTHYROXINE SODIUM 125 MCG: 0.12 TABLET ORAL at 05:00

## 2020-12-08 ASSESSMENT — PAIN SCALES - GENERAL
PAINLEVEL_OUTOF10: 0
PAINLEVEL_OUTOF10: 0

## 2020-12-08 NOTE — PROGRESS NOTES
Hospitalist Progress Note      PCP: Chio Gaitan MD    Date of Admission: 12/4/2020       Chief Complaint: Bilateral leg edema and weight gain        Subjective:  good L UOP.  was refusing standing weights. Jimy Arias says she feels better, breathing more easily, slept well for the first time in awhile.  Awaiting labs. States she is normally 'not this fat', no overnight events      Medications:  Reviewed    Infusion Medications    dextrose      furosemide (LASIX) 1mg/ml infusion 15 mg/hr (12/08/20 8779)     Scheduled Medications    magnesium sulfate  4 g Intravenous Once    spironolactone  25 mg Oral Daily    fluticasone  1 spray Each Nostril Daily    amLODIPine  10 mg Oral Daily    apixaban  5 mg Oral BID    atorvastatin  20 mg Oral Daily    insulin glargine  15 Units Subcutaneous Nightly    levothyroxine  125 mcg Oral Daily    pantoprazole  40 mg Oral Daily    sertraline  125 mg Oral Nightly    sodium chloride flush  10 mL Intravenous 2 times per day    insulin lispro  0-12 Units Subcutaneous TID WC    insulin lispro  0-6 Units Subcutaneous Nightly    tiZANidine  4 mg Oral BID     PRN Meds: sodium chloride flush, acetaminophen **OR** acetaminophen, polyethylene glycol, promethazine **OR** ondansetron, glucose, dextrose, glucagon (rDNA), dextrose, perflutren lipid microspheres, traMADol, carboxymethylcellulose PF, sodium chloride flush      Intake/Output Summary (Last 24 hours) at 12/8/2020 1113  Last data filed at 12/8/2020 0824  Gross per 24 hour   Intake 360 ml   Output 3050 ml   Net -2690 ml       Physical Exam Performed:    BP (!) 153/67   Pulse 79   Temp 98.6 °F (37 °C) (Oral)   Resp 16   Ht 5' 5\" (1.651 m)   Wt (!) 363 lb 12.1 oz (165 kg)   SpO2 95%   BMI 60.53 kg/m²     General appearance: No apparent distress, appears stated age and cooperative. obese  HEENT: Pupils equal, round, and reactive to light. Conjunctivae/corneas clear. Neck: Supple, with full range of motion.  No jugular venous distention. Trachea midline. Respiratory:  Normal respiratory effort. Clear to auscultation, bilaterally without Wheezes/Rhonchi.  Faint rales.   Cardiovascular: Regular rate and rhythm with normal S1/S2 without murmurs, rubs or gallops. Abdomen: Soft, non-tender, non-distended with normal bowel sounds. Musculoskeletal: No clubbing, cyanosis.  significant Pitting anasarca.  Full range of motion without deformity. Skin: Skin color, texture, turgor normal.  No rashes or lesions. Neurologic:  Neurovascularly intact without any focal sensory/motor deficits. Cranial nerves: II-XII intact, grossly non-focal.  Psychiatric: Alert and oriented, thought content appropriate, normal insight  Capillary Refill: Brisk,< 3 seconds   Peripheral Pulses: +2 palpable, equal bilaterally     Labs:   Recent Labs     12/06/20  0931   WBC 3.4*   HGB 7.8*   HCT 24.8*        Recent Labs     12/06/20  0931 12/07/20  0618 12/08/20  0652    135* 139   K 4.1 3.7 3.7    103 103   CO2 25 22 26   BUN 44* 42* 40*   CREATININE 1.7* 1.6* 1.3*   CALCIUM 9.8 9.2 9.3     No results for input(s): AST, ALT, BILIDIR, BILITOT, ALKPHOS in the last 72 hours. No results for input(s): INR in the last 72 hours. No results for input(s): Arnaldo Clap in the last 72 hours. Urinalysis:      Lab Results   Component Value Date    NITRU Negative 12/05/2020    WBCUA 3-5 12/05/2020    BACTERIA 2+ 08/20/2020    RBCUA 5-10 12/05/2020    BLOODU SMALL 12/05/2020    SPECGRAV 1.025 12/05/2020    GLUCOSEU Negative 12/05/2020       Radiology:  CT ABDOMEN PELVIS WO CONTRAST Additional Contrast? None   Final Result   1. Findings of diffuse volume overload including abdominal and pelvic   ascites, anasarca and findings of congestive heart failure in the lower lungs. 2. Hepatic morphologic features of cirrhosis. No gross hepatic lesion   evident.    3. Splenomegaly, typically associated with portal venous hypertension but she couldn't make it to the bathroom in time.  Staff there will need to focus on helping her to the bathroom quickly when she asks. - at the time of last discharge on 8/23/20, she weighed 242 lbs on a standing scale.  I reviewed the last couple years of her admissions and her claims that she usually diureses from 360 lbs down to 190 do not seem to be accurate.    - F/u TTE.  - no ARB/ARNI due to DIONNE, consider as outpatient  - she is on amlodipine.    - need to add metoprolol when she is closer to euvolemic     DIONNE on CKD3  - due to cardiorenal syndrome.  Baseline Cr perhaps about 1.3.  Monitored     pAfib- stable  -on apixaban(eliquis)     DM2  - insulin regimen     Chronic normocytic anemia  - 1u pRBCs on 12/5     HLD  - statin     Morbid obesity  With Body mass index is 52.4 kg/m². Complicating assessment and treatment. Placing patient at risk for multiple co-morbidities as well as early death and contributing to the patient's presentation. Counseled on weight loss      DVT Prophylaxis: on home eliquis  Diet: DIET LOW SODIUM 2 GM; 2000 ml  Code Status: Full Code    PT/OT Eval Status: not indicated     Dispo - when adequately diuresed. Althea Mora 12/11.  She lives at [facility].      Sohail Harrington MD

## 2020-12-08 NOTE — CONSULTS
Consulted for MASD in abdominal folds. MASD is present; pt very obese and edematous; small areas of denuded noted; cleaned abdominal folds and behind bilateral knees; pillow case placed under abdominal folds and interdry used behind bilateral knees. Updated primary RN. Wound care orders placed. Wound Care to sign off; please re-consult if any deterioration or changes.

## 2020-12-09 LAB
ANION GAP SERPL CALCULATED.3IONS-SCNC: 9 MMOL/L (ref 3–16)
BASOPHILS ABSOLUTE: 0 K/UL (ref 0–0.2)
BASOPHILS RELATIVE PERCENT: 1.1 %
BUN BLDV-MCNC: 40 MG/DL (ref 7–20)
CALCIUM SERPL-MCNC: 9.3 MG/DL (ref 8.3–10.6)
CHLORIDE BLD-SCNC: 104 MMOL/L (ref 99–110)
CO2: 25 MMOL/L (ref 21–32)
CREAT SERPL-MCNC: 1.3 MG/DL (ref 0.6–1.2)
EOSINOPHILS ABSOLUTE: 0.1 K/UL (ref 0–0.6)
EOSINOPHILS RELATIVE PERCENT: 2.5 %
GFR AFRICAN AMERICAN: 49
GFR NON-AFRICAN AMERICAN: 41
GLUCOSE BLD-MCNC: 151 MG/DL (ref 70–99)
GLUCOSE BLD-MCNC: 152 MG/DL (ref 70–99)
GLUCOSE BLD-MCNC: 169 MG/DL (ref 70–99)
GLUCOSE BLD-MCNC: 174 MG/DL (ref 70–99)
GLUCOSE BLD-MCNC: 269 MG/DL (ref 70–99)
HCT VFR BLD CALC: 26.2 % (ref 36–48)
HEMOGLOBIN: 8.1 G/DL (ref 12–16)
LYMPHOCYTES ABSOLUTE: 0.3 K/UL (ref 1–5.1)
LYMPHOCYTES RELATIVE PERCENT: 7.6 %
MAGNESIUM: 2 MG/DL (ref 1.8–2.4)
MCH RBC QN AUTO: 25.8 PG (ref 26–34)
MCHC RBC AUTO-ENTMCNC: 31 G/DL (ref 31–36)
MCV RBC AUTO: 83.2 FL (ref 80–100)
MONOCYTES ABSOLUTE: 0.2 K/UL (ref 0–1.3)
MONOCYTES RELATIVE PERCENT: 5.2 %
NEUTROPHILS ABSOLUTE: 3.8 K/UL (ref 1.7–7.7)
NEUTROPHILS RELATIVE PERCENT: 83.6 %
PDW BLD-RTO: 16.4 % (ref 12.4–15.4)
PERFORMED ON: ABNORMAL
PLATELET # BLD: 171 K/UL (ref 135–450)
PMV BLD AUTO: 7.1 FL (ref 5–10.5)
POTASSIUM SERPL-SCNC: 3.7 MMOL/L (ref 3.5–5.1)
RBC # BLD: 3.15 M/UL (ref 4–5.2)
SODIUM BLD-SCNC: 138 MMOL/L (ref 136–145)
WBC # BLD: 4.5 K/UL (ref 4–11)

## 2020-12-09 PROCEDURE — 6370000000 HC RX 637 (ALT 250 FOR IP): Performed by: INTERNAL MEDICINE

## 2020-12-09 PROCEDURE — 97530 THERAPEUTIC ACTIVITIES: CPT

## 2020-12-09 PROCEDURE — 6370000000 HC RX 637 (ALT 250 FOR IP): Performed by: NURSE PRACTITIONER

## 2020-12-09 PROCEDURE — 6360000002 HC RX W HCPCS: Performed by: INTERNAL MEDICINE

## 2020-12-09 PROCEDURE — 97166 OT EVAL MOD COMPLEX 45 MIN: CPT

## 2020-12-09 PROCEDURE — 2580000003 HC RX 258: Performed by: NURSE PRACTITIONER

## 2020-12-09 PROCEDURE — 80048 BASIC METABOLIC PNL TOTAL CA: CPT

## 2020-12-09 PROCEDURE — 2580000003 HC RX 258: Performed by: INTERNAL MEDICINE

## 2020-12-09 PROCEDURE — 97535 SELF CARE MNGMENT TRAINING: CPT

## 2020-12-09 PROCEDURE — 97161 PT EVAL LOW COMPLEX 20 MIN: CPT

## 2020-12-09 PROCEDURE — 83735 ASSAY OF MAGNESIUM: CPT

## 2020-12-09 PROCEDURE — 1200000000 HC SEMI PRIVATE

## 2020-12-09 PROCEDURE — 36415 COLL VENOUS BLD VENIPUNCTURE: CPT

## 2020-12-09 PROCEDURE — 85025 COMPLETE CBC W/AUTO DIFF WBC: CPT

## 2020-12-09 RX ADMIN — PANTOPRAZOLE SODIUM 40 MG: 40 TABLET, DELAYED RELEASE ORAL at 10:03

## 2020-12-09 RX ADMIN — ACETAMINOPHEN 650 MG: 325 TABLET ORAL at 22:08

## 2020-12-09 RX ADMIN — ATORVASTATIN CALCIUM 20 MG: 10 TABLET, FILM COATED ORAL at 22:09

## 2020-12-09 RX ADMIN — INSULIN LISPRO 2 UNITS: 100 INJECTION, SOLUTION INTRAVENOUS; SUBCUTANEOUS at 16:44

## 2020-12-09 RX ADMIN — SODIUM CHLORIDE, PRESERVATIVE FREE 10 ML: 5 INJECTION INTRAVENOUS at 22:02

## 2020-12-09 RX ADMIN — APIXABAN 5 MG: 5 TABLET, FILM COATED ORAL at 22:00

## 2020-12-09 RX ADMIN — AMLODIPINE BESYLATE 10 MG: 5 TABLET ORAL at 10:02

## 2020-12-09 RX ADMIN — LEVOTHYROXINE SODIUM 125 MCG: 0.12 TABLET ORAL at 06:04

## 2020-12-09 RX ADMIN — SPIRONOLACTONE 25 MG: 25 TABLET ORAL at 10:03

## 2020-12-09 RX ADMIN — TIZANIDINE 4 MG: 4 TABLET ORAL at 10:02

## 2020-12-09 RX ADMIN — SERTRALINE HYDROCHLORIDE 125 MG: 50 TABLET ORAL at 22:00

## 2020-12-09 RX ADMIN — FLUTICASONE PROPIONATE 1 SPRAY: 50 SPRAY, METERED NASAL at 10:07

## 2020-12-09 RX ADMIN — TIZANIDINE 4 MG: 4 TABLET ORAL at 22:01

## 2020-12-09 RX ADMIN — FUROSEMIDE 15 MG/HR: 10 INJECTION, SOLUTION INTRAMUSCULAR; INTRAVENOUS at 13:36

## 2020-12-09 RX ADMIN — INSULIN LISPRO 1 UNITS: 100 INJECTION, SOLUTION INTRAVENOUS; SUBCUTANEOUS at 21:59

## 2020-12-09 RX ADMIN — INSULIN GLARGINE 15 UNITS: 100 INJECTION, SOLUTION SUBCUTANEOUS at 21:59

## 2020-12-09 RX ADMIN — FUROSEMIDE 15 MG/HR: 10 INJECTION, SOLUTION INTRAMUSCULAR; INTRAVENOUS at 22:03

## 2020-12-09 RX ADMIN — APIXABAN 5 MG: 5 TABLET, FILM COATED ORAL at 10:03

## 2020-12-09 RX ADMIN — SODIUM CHLORIDE, PRESERVATIVE FREE 10 ML: 5 INJECTION INTRAVENOUS at 10:05

## 2020-12-09 RX ADMIN — INSULIN LISPRO 2 UNITS: 100 INJECTION, SOLUTION INTRAVENOUS; SUBCUTANEOUS at 10:03

## 2020-12-09 RX ADMIN — INSULIN LISPRO 6 UNITS: 100 INJECTION, SOLUTION INTRAVENOUS; SUBCUTANEOUS at 13:00

## 2020-12-09 ASSESSMENT — PAIN SCALES - GENERAL
PAINLEVEL_OUTOF10: 0
PAINLEVEL_OUTOF10: 5
PAINLEVEL_OUTOF10: 0

## 2020-12-09 NOTE — PROGRESS NOTES
No acute pulmonary disease. Stable enlargement of cardiac silhouette. Assessment/Plan:    Active Hospital Problems    Diagnosis    Acute on chronic diastolic CHF (congestive heart failure) ([facility]) [I50.33]    Morbid obesity ([facility] [E66.01]    Fluid overload [E87.70]    CHF (congestive heart failure) ([facility]) [I50.9]    CAD (coronary artery disease) [I25.10]    Acute kidney injury superimposed on chronic kidney disease ([facility] [address] [N17.9, N18.9]    Essential hypertension [I10]    Type 2 diabetes mellitus, with long-term current use of insulin ([facility]) [E11.9, Z79.4]    Stage 3 chronic kidney disease [N18.30]     \"70 y.o. female, with past medical history of CHF, morbid obesity, diabetes, hypertension, CAD, hyperlipidemia and atrial fibrillation, who presented to [facility] with bilateral leg edema and weight gain. The patient is a resident of [address] [place] [address]. Catalino Alfonso stated over the last 4 months she has gained roughly 74 pounds. Catalino Alfonso stated most of her weight is in her abdomen and bilateral legs.  Patient stated she has a history of Flonnie Royals as well as CKD stage III.  She stated she sees  from nephrology at [facility] in [facility].  Per EMR, in 2018 the patient had 8.5 L removed through paracentesis while admitted at [address] [place] [address] Ne the emergency room patient had a chest x-ray that revealed vascular congestion.  She also had a CT abdomen pelvis that revealed findings of diffuse volume overload including abdominal and pelvic ascites, anasarca and findings consistent of congestive heart failure in the lower lungs. \"         Acute on chronic diastolic CHF  - furosemide gtt (increased rate), added spironolactone.  She will need to be more compliant with her outpatient diuretic regimen - she admits to refusing meds because she couldn't make it to the bathroom in time.  Staff there will need to focus on helping her to the bathroom quickly when she asks.   - at the time of last discharge on 8/23/20, she weighed 242 lbs on a standing scale.  I reviewed the last couple years of her admissions and her claims that she usually diureses from 360 lbs down to 190 do not seem to be accurate.    - F/u Echo(ef 55%, no wm abn, rv enlarged, mod tr, severe pulm htn)  - no ARB/ARNI due to DIONNE, consider as outpatient  - she is on amlodipine.    - need to add metoprolol when she is closer to euvolemic     DIONNE on CKD3  - due to cardiorenal syndrome.  Baseline Cr perhaps about 1.3.  Monitored     pAfib- stable  -on apixaban(eliquis)     DM2  - insulin regimen     Chronic normocytic anemia  - 1u pRBCs on 12/5     HLD  - statin     Morbid obesity  With Body mass index is 52.4 kg/m². Complicating assessment and treatment. Placing patient at risk for multiple co-morbidities as well as early death and contributing to the patient's presentation.  Counseled on weight loss        DVT Prophylaxis: on eliquis  Diet: DIET LOW SODIUM 2 GM; 2000 ml  Code Status: Full Code    PT/OT Eval Status: ordered 12/9 and rec ecf with pt/ot       Dispo - when adequately diuresed. Mahsa Go 12/11.  She lives at [address] MD

## 2020-12-09 NOTE — PROGRESS NOTES
Chronic pain syndrome, Cirrhosis ([facility] [address] Depression, Diabetes mellitus ([facility] [address] GERD (gastroesophageal reflux disease), Hyperlipidemia, Hypertension, Kidney disease, Thyroid disease, and Vitamin D deficiency. has a past surgical history that includes Coronary Artery Bypass Graft Maze Procedure (1995); Cholecystectomy; and Breast reduction surgery. Restrictions     Vision/Hearing  Vision: Impaired  Vision Exceptions: Wears glasses at all times  Hearing: Within functional limits     Subjective  General  Chart Reviewed: Yes  Patient assessed for rehabilitation services?: Yes  Response To Previous Treatment: Not applicable  Family / Caregiver Present: No  Referring Practitioner: Joey Bentley MD  Referral Date : 12/09/20  Diagnosis: fluid overload  Follows Commands: Within Functional Limits  General Comment  Comments: cleared by nursing  Subjective  Subjective: pt resting inbed. Reports discomfort in bottom from sitting on wrinkles in the bed  Pain Screening  Patient Currently in Pain: Denies          Orientation  Orientation  Overall Orientation Status: Within Normal Limits  Social/Functional History  Social/Functional History  Type of Home: Assisted living([facility] [place])  Home Layout: One level  Home Equipment: Rolling walker  ADL Assistance: Needs assistance  Homemaking Responsibilities: No  Ambulation Assistance: Needs assistance(very limited ambulation due to additional swelling/weight)  Transfer Assistance: Needs assistance  Active : No  Cognition        Objective          PROM RLE (degrees)  RLE General PROM: limited by body habitus  PROM LLE (degrees)  LLE General PROM: limited by body habitus  Strength RLE  Comment: limted assessment due to positioning limitations. Able to stand with walker  Strength LLE  Comment: limted assessment due to positioning limitations.  Able to stand with walker  Tone RLE  RLE Tone: Normotonic  Tone LLE  LLE Tone: Normotonic  Sensation  Overall Sensation Status: Impaired(numbness in L thigh)  Bed mobility  Supine to Sit: 2 Person assistance;Maximum assistance  Sit to Supine: 2 Person assistance; Moderate assistance  Scootin Person assistance;Maximal assistance  Transfers  Sit to Stand: 2 Person Assistance;Minimal Assistance  Stand to sit: 2 Person Assistance;Minimal Assistance  Ambulation  Ambulation?: No(1 lateral step to [facility] wtih walker with Min A)     Balance  Posture: Fair  Sitting - Static: Fair  Sitting - Dynamic: Fair  Standing - Static: Fair  Standing - Dynamic: [address]  Times per week: 2-4x/week  Current Treatment Recommendations: Strengthening, Balance Training, Endurance Training, Functional Mobility Training, Transfer Training, Gait Training, Positioning  Safety Devices  Type of devices:  All fall risk precautions in place, Bed alarm in place, Gait belt, Patient at risk for falls, Left in bed, Nurse notified, Call light within reach  Restraints  Initially in place: No      AM-PAC Score  AM-PAC Inpatient Mobility Raw Score : 13 (20)  AM-PAC Inpatient T-Scale Score : 36.74 (20)  Mobility Inpatient CMS 0-100% Score: 64.91 (20)  Mobility Inpatient CMS G-Code Modifier : CL (20)          Goals  Short term goals  Time Frame for Short term goals: 12/15  Short term goal 1: Pt will complete bed mobility wtih min A  Short term goal 2: Pt will sit to stand wtih SBA  Short term goal 3: Pt will ambulate x 5' with RW with CGA  Short term goal 4: Pt will sit in a chair x 60 minutes  Short term goal 5: Pt will participate in B LE exercises to improve functional strength and mobility by   Patient Goals   Patient goals : none expressed       Therapy Time   Individual Concurrent Group Co-treatment   Time In 920         Time Out 0955         Minutes 35         Timed Code Treatment Minutes: 24 [facility], PT

## 2020-12-09 NOTE — FLOWSHEET NOTE
12/08/20 2030   Assessment   Charting Type Shift assessment   Neurological   Neuro (WDL) WDL   Level of Consciousness Alert (0)   Carleen Coma Scale   Eye Opening 4   Best Verbal Response 5   Best Motor Response 6   Carleen Coma Scale Score 15   HEENT   HEENT (WDL) X   Teeth Dentures upper   Right Eye Impaired vision   Left Eye Impaired vision   Respiratory   Respiratory (WDL) X   Respiratory Quality/Effort Unlabored   Chest Assessment Chest expansion symmetrical   L Breath Sounds Diminished   R Breath Sounds Diminished   Cardiac   Cardiac (WDL) X   Cardiac Regularity Irregular   Cardiac Rhythm Atrial fibrillation   Cardiac Monitor   Telemetry Monitor On Yes   Telemetry Audible Yes   Telemetry Alarms Set Yes   Gastrointestinal   Abdominal (WDL) X   Abdomen Inspection Rotund; Other (Comment)  (acities )   Last BM (including prior to admit) 12/08/20   Tenderness Soft;Nontender   RUQ Bowel Sounds Active   LUQ Bowel Sounds Active   RLQ Bowel Sounds Active   LLQ Bowel Sounds Active   Peripheral Vascular   Peripheral Vascular (WDL) X   Edema Right lower extremity; Left lower extremity   RLE Edema +3   LLE Edema +3   RUE Neurovascular Assessment   Capillary Refill Less than/equal to 3 seconds   Color Appropriate for ethnicity   Temperature Warm   Sensation RUE Full sensation   R Radial Pulse +2   RLE Neurovascular Assessment   Capillary Refill Less than/equal to 3 seconds   Color Red   Temperature Warm   Sensation RLE Full sensation   R Pedal Pulse +1   R Calf Tenderness  Negative   LLE Neurovascular Assessment   Capillary Refill Less than/equal to 3 seconds   Color Red   Temperature Warm   Sensation LLE Full sensation   L Pedal Pulse +1   L Calf Tenderness Negative   Skin Color/Condition   Skin Color/Condition (WDL) X   Skin Color Appropriate for ethnicity   Skin Condition/Temp Dry;Warm;Swollen   Skin Integrity   Skin Integrity (WDL) X   Skin Integrity Abrasion; Redness   Location coccyx   Preventative Dressing No   Skin Fold Management Yes   Dressing Site Abdominal pannus   Treatment Silver impregnated textile   Assessed this shift Moist;Red   Musculoskeletal   Musculoskeletal (WDL) X   RUE Full movement   LUE Full movement   RL Extremity Weakness   LL Extremity Weakness   Genitourinary   Genitourinary (WDL) X  (mora in place)   Flank Tenderness No   Suprapubic Tenderness No   Dysuria MEGAN   Anus/Rectum   Anus/Rectum (WDL) WDL   Urethral Catheter Non-latex 16 fr   Placement Date/Time: 12/04/20 6928   Urethral Catheter Timeout: Patient; Appropriate Equipment;Correct Position;Sterile technique;Procedure;Site/Side; Availability of Implant  Inserted by: Brittany Mcfarland   Catheter Type: Non-latex  Tube Size (fr): 16 fr ... Catheter Indications Need for fluid management in critically ill patients in a critical care setting not able to be managed by other means such as BSC with hat, bedpan, urinal, condom catheter, or short term intermittent urethral catherization   Site Assessment Pink; No urethral drainage   Urine Color Yellow   Urine Appearance Clear   Psychosocial   Psychosocial (WDL) WDL

## 2020-12-09 NOTE — PLAN OF CARE
Problem: OXYGENATION/RESPIRATORY FUNCTION  Goal: Patient will maintain patent airway  Outcome: Ongoing  Note:   Patient's EF (Ejection Fraction) is greater than 40%    Heart Failure Medications:  Diuretics[de-identified] Furosemide and Spironolactone    (One of the following REQUIRED for EF <40%/SYSTOLIC FAILURE but MAY be used in EF% >40%/DIASTOLIC FAILURE)        ACE[de-identified] None        ARB[de-identified] None         ARNI[de-identified] None    (Beta Blockers)  NON- Evidenced Based Beta Blocker (for EF% >40%/DIASTOLIC FAILURE): None    Evidenced Based Beta Blocker::(REQUIRED for EF% <40%/SYSTOLIC FAILURE) None  . .................................................................................................................................................. Patient's weights and intake/output reviewed: Yes    Patient's Last Weight: 363 lbs obtained by standing scale. Difference of 2 lbs more than last documented weight. Intake/Output Summary (Last 24 hours) at 12/9/2020 1124  Last data filed at 12/9/2020 1112  Gross per 24 hour   Intake 370 ml   Output 3075 ml   Net -2705 ml       Comorbidities Reviewed Yes    Patient has a past medical history of Anxiety, Arthritis, Atrial fibrillation ([address] CAD (coronary artery disease), CHF (congestive heart failure) ([address] Chronic pain syndrome, Cirrhosis ([address] Depression, Diabetes mellitus ([address] GERD (gastroesophageal reflux disease), Hyperlipidemia, Hypertension, Kidney disease, Thyroid disease, and Vitamin D deficiency. >>For CHF and Comorbidity documentation on Education Time and Topics, please see Education Tab    Progressive Mobility Assessment:  What is this patient's Current Level of Mobility?: Ambulatory- with Assistance  How was this patient Mobilized today?: Patient Refuses to Mobilize                 With Whom? Nurse, PCA, and Self                 Level of Difficulty/Assistance: 1x Assist     Pt resting in bed at this time on room air. Pt denies shortness of breath.  Pt with pitting lower

## 2020-12-09 NOTE — PROGRESS NOTES
Occupational Therapy   Occupational Therapy Initial Assessment and Treatment Note   Date: 2020   Patient Name: Germain Yip  MRN: [id number]     : 1950    Date of Service: 2020    Discharge Recommendations:  ECF with OT     Assessment   Performance deficits / Impairments: Decreased functional mobility ; Decreased ADL status; Decreased safe awareness;Decreased cognition;Decreased endurance;Decreased balance  Assessment: OT eval completed. Pt admitted from [facility] for fluid overload. Pt required mod x2 for functional mobility this session. She limited her participation to standing at EOB with walker and refused to transfer. LE ADLs are total assist.  Pt needed some encouragement to participate in therapy activity. Recommend ECF with OT at d/c. Cont skilled OT in acute care. Prognosis: Fair  Decision Making: Medium Complexity  OT Education: OT Role;Plan of Care;Equipment  Patient Education: disease specific ed:  role of OT, use of walker for standing balance  REQUIRES OT FOLLOW UP: Yes  Activity Tolerance  Activity Tolerance: Patient Tolerated treatment well  Safety Devices  Safety Devices in place: Yes  Type of devices: Left in bed;Bed alarm in place;Call light within reach;Nurse notified;Gait belt         Patient Diagnosis(es): The primary encounter diagnosis was Acute kidney injury superimposed on chronic kidney disease ([address] Diagnoses of Anasarca, Liver cirrhosis secondary to LARRY (nonalcoholic steatohepatitis) ([address] Cirrhosis of liver with ascites, unspecified hepatic cirrhosis type ([address] and Symptomatic anemia were also pertinent to this visit.      has a past medical history of Anxiety, Arthritis, Atrial fibrillation ([address] CAD (coronary artery disease), CHF (congestive heart failure) ([address] Chronic pain syndrome, Cirrhosis ([address] Depression, Diabetes mellitus ([address] GERD (gastroesophageal reflux disease), Hyperlipidemia, Hypertension, Kidney disease, Thyroid disease, and Vitamin D deficiency. has a past surgical history that includes Coronary Artery Bypass Graft Maze Procedure (1995); Cholecystectomy; and Breast reduction surgery. Restrictions  Restrictions/Precautions  Restrictions/Precautions: Fall Risk, Up as Tolerated  Position Activity Restriction  Other position/activity restrictions: IV, mora    Subjective   General  Chart Reviewed: Yes  Patient assessed for rehabilitation services?: Yes  Family / Caregiver Present: No  Referring Practitioner: PRATIMA Monroe  Diagnosis: fluid overload  Subjective  Subjective: Pt resting in bed, agreeable to OT with some encouragement  General Comment  Comments: RN approved therapy    Social/Functional History  Social/Functional History  Type of Home: Assisted living([facility])  Home Layout: One level  Home Equipment: Rolling walker  ADL Assistance: Needs assistance  Homemaking Responsibilities: No  Ambulation Assistance: Needs assistance(very limited ambulation due to additional swelling/weight)  Transfer Assistance: Needs assistance  Active : No     Objective   Vision: Impaired  Vision Exceptions: Wears glasses at all times  Hearing: Within functional limits    Orientation  Overall Orientation Status: Within Functional Limits     Balance  Sitting Balance: Stand by assistance  Standing Balance: Dependent/Total(min x2 with SW)  Standing Balance  Activity: Stood from EOB with mod x2 2x with SW. She adamantly refused to take steps. Returned to sitting and requested to be weighed on scale. Pt stood on scale with mod x2. Pt requires vc's to follow directions appropriately during mobility. ADL  Feeding: Independent  UE Dressing: Minimal assistance(gown)  LE Dressing: Maximum assistance;Dependent/Total(socks)  Toileting: Dependent/Total(moar)  Additional Comments: Pt was scratching skin on back and hips. Provided lotion to these areas but pt continued to c/o itchiness.   Tone RUE  RUE Tone: Normotonic  Tone LUE  LUE Tone: Normotonic  Coordination  Movements Are Fluid And Coordinated: Yes     Bed mobility  Supine to Sit: Maximum assistance;2 Person assistance  Sit to Supine: Moderate assistance;2 Person assistance  Scooting: Maximal assistance;2 Person assistance  Transfers  Stand Step Transfers: Moderate assistance;2 Person assistance(small side steps to [facility] with SW)  Sit to stand: Moderate assistance;2 Person assistance(SW)  Stand to sit: Moderate assistance;2 Person assistance     Cognition  Overall Cognitive Status: Exceptions  Following Commands:  Follows one step commands consistently  Attention Span: Attends with cues to redirect  Safety Judgement: Decreased awareness of need for safety  Problem Solving: Assistance required to correct errors made;Assistance required to identify errors made;Decreased awareness of errors  Insights: Decreased awareness of deficits        Sensation  Overall Sensation Status: Impaired(numbness in L thigh)      LUE AROM (degrees)  LUE AROM : WFL  RUE AROM (degrees)  RUE AROM : WFL  LUE Strength  Gross LUE Strength: WFL  RUE Strength  Gross RUE Strength: WFL         Plan   Plan  Times per week: 2-4x  Current Treatment Recommendations: Self-Care / ADL, Safety Education & Training, Endurance Training, Functional Mobility Training, Balance Training    AM-[facility] Score   [facility] Inpatient Daily Activity Raw Score: 14 (12/09/20 1104)  AM-PAC Inpatient ADL T-Scale Score : 33.39 (12/09/20 1104)  ADL Inpatient CMS 0-100% Score: 59.67 (12/09/20 1104)  ADL Inpatient CMS G-Code Modifier : CK (12/09/20 1104)  Goals  Short term goals  Time Frame for Short term goals: 1 week (12/16) unless noted  Short term goal 1: Perform functional transfer with mod A and SW  Short term goal 2: Perform UE exer 15x each to improve endurance  Short term goal 3: Perform 2 UE ADL tasks with supervision by 12/14  Patient Goals   Patient goals : Pt did not indicate     Therapy Time   Individual Concurrent Group Co-treatment   Time In 0920 Time Out 0955         Minutes 35         Timed Code Treatment Minutes: 25 Minutes(10 min eval)    If pt is discharged prior to next OT session, this note will serve as the discharge summary.   Renee Arreguin OT

## 2020-12-10 LAB
ALBUMIN SERPL-MCNC: 3.3 G/DL (ref 3.4–5)
ANION GAP SERPL CALCULATED.3IONS-SCNC: 8 MMOL/L (ref 3–16)
BUN BLDV-MCNC: 40 MG/DL (ref 7–20)
CALCIUM SERPL-MCNC: 9.6 MG/DL (ref 8.3–10.6)
CHLORIDE BLD-SCNC: 103 MMOL/L (ref 99–110)
CO2: 28 MMOL/L (ref 21–32)
CREAT SERPL-MCNC: 1.2 MG/DL (ref 0.6–1.2)
GFR AFRICAN AMERICAN: 54
GFR NON-AFRICAN AMERICAN: 45
GLUCOSE BLD-MCNC: 123 MG/DL (ref 70–99)
GLUCOSE BLD-MCNC: 137 MG/DL (ref 70–99)
GLUCOSE BLD-MCNC: 220 MG/DL (ref 70–99)
GLUCOSE BLD-MCNC: 223 MG/DL (ref 70–99)
GLUCOSE BLD-MCNC: 237 MG/DL (ref 70–99)
MAGNESIUM: 1.7 MG/DL (ref 1.8–2.4)
PERFORMED ON: ABNORMAL
PHOSPHORUS: 3 MG/DL (ref 2.5–4.9)
POTASSIUM SERPL-SCNC: 3.1 MMOL/L (ref 3.5–5.1)
SODIUM BLD-SCNC: 139 MMOL/L (ref 136–145)

## 2020-12-10 PROCEDURE — 6370000000 HC RX 637 (ALT 250 FOR IP): Performed by: NURSE PRACTITIONER

## 2020-12-10 PROCEDURE — 97530 THERAPEUTIC ACTIVITIES: CPT

## 2020-12-10 PROCEDURE — 36415 COLL VENOUS BLD VENIPUNCTURE: CPT

## 2020-12-10 PROCEDURE — 97535 SELF CARE MNGMENT TRAINING: CPT

## 2020-12-10 PROCEDURE — 80069 RENAL FUNCTION PANEL: CPT

## 2020-12-10 PROCEDURE — 6360000002 HC RX W HCPCS: Performed by: INTERNAL MEDICINE

## 2020-12-10 PROCEDURE — 6370000000 HC RX 637 (ALT 250 FOR IP): Performed by: INTERNAL MEDICINE

## 2020-12-10 PROCEDURE — 97110 THERAPEUTIC EXERCISES: CPT

## 2020-12-10 PROCEDURE — 2580000003 HC RX 258: Performed by: NURSE PRACTITIONER

## 2020-12-10 PROCEDURE — 2580000003 HC RX 258: Performed by: INTERNAL MEDICINE

## 2020-12-10 PROCEDURE — 1200000000 HC SEMI PRIVATE

## 2020-12-10 PROCEDURE — 83735 ASSAY OF MAGNESIUM: CPT

## 2020-12-10 RX ORDER — POTASSIUM CHLORIDE 20 MEQ/1
20 TABLET, EXTENDED RELEASE ORAL 2 TIMES DAILY
Status: DISCONTINUED | OUTPATIENT
Start: 2020-12-10 | End: 2020-12-21

## 2020-12-10 RX ORDER — MAGNESIUM SULFATE IN WATER 40 MG/ML
2 INJECTION, SOLUTION INTRAVENOUS ONCE
Status: COMPLETED | OUTPATIENT
Start: 2020-12-10 | End: 2020-12-10

## 2020-12-10 RX ADMIN — APIXABAN 5 MG: 5 TABLET, FILM COATED ORAL at 21:17

## 2020-12-10 RX ADMIN — LEVOTHYROXINE SODIUM 125 MCG: 0.12 TABLET ORAL at 06:15

## 2020-12-10 RX ADMIN — FUROSEMIDE 15 MG/HR: 10 INJECTION, SOLUTION INTRAMUSCULAR; INTRAVENOUS at 11:09

## 2020-12-10 RX ADMIN — INSULIN LISPRO 2 UNITS: 100 INJECTION, SOLUTION INTRAVENOUS; SUBCUTANEOUS at 21:18

## 2020-12-10 RX ADMIN — MAGNESIUM SULFATE HEPTAHYDRATE 2 G: 40 INJECTION, SOLUTION INTRAVENOUS at 11:11

## 2020-12-10 RX ADMIN — SODIUM CHLORIDE, PRESERVATIVE FREE 10 ML: 5 INJECTION INTRAVENOUS at 09:09

## 2020-12-10 RX ADMIN — APIXABAN 5 MG: 5 TABLET, FILM COATED ORAL at 09:09

## 2020-12-10 RX ADMIN — TIZANIDINE 4 MG: 4 TABLET ORAL at 09:09

## 2020-12-10 RX ADMIN — FUROSEMIDE 15 MG/HR: 10 INJECTION, SOLUTION INTRAMUSCULAR; INTRAVENOUS at 04:38

## 2020-12-10 RX ADMIN — AMLODIPINE BESYLATE 10 MG: 5 TABLET ORAL at 09:09

## 2020-12-10 RX ADMIN — FUROSEMIDE 15 MG/HR: 10 INJECTION, SOLUTION INTRAMUSCULAR; INTRAVENOUS at 18:58

## 2020-12-10 RX ADMIN — POTASSIUM CHLORIDE 20 MEQ: 20 TABLET, EXTENDED RELEASE ORAL at 11:11

## 2020-12-10 RX ADMIN — SODIUM CHLORIDE, PRESERVATIVE FREE 10 ML: 5 INJECTION INTRAVENOUS at 21:18

## 2020-12-10 RX ADMIN — TIZANIDINE 4 MG: 4 TABLET ORAL at 21:17

## 2020-12-10 RX ADMIN — FLUTICASONE PROPIONATE 1 SPRAY: 50 SPRAY, METERED NASAL at 09:11

## 2020-12-10 RX ADMIN — CARBOXYMETHYLCELLULOSE SODIUM 1 DROP: 10 GEL OPHTHALMIC at 21:36

## 2020-12-10 RX ADMIN — INSULIN LISPRO 4 UNITS: 100 INJECTION, SOLUTION INTRAVENOUS; SUBCUTANEOUS at 17:27

## 2020-12-10 RX ADMIN — PANTOPRAZOLE SODIUM 40 MG: 40 TABLET, DELAYED RELEASE ORAL at 09:09

## 2020-12-10 RX ADMIN — SPIRONOLACTONE 25 MG: 25 TABLET ORAL at 09:09

## 2020-12-10 RX ADMIN — INSULIN LISPRO 4 UNITS: 100 INJECTION, SOLUTION INTRAVENOUS; SUBCUTANEOUS at 12:39

## 2020-12-10 RX ADMIN — ATORVASTATIN CALCIUM 20 MG: 10 TABLET, FILM COATED ORAL at 09:09

## 2020-12-10 RX ADMIN — INSULIN GLARGINE 15 UNITS: 100 INJECTION, SOLUTION SUBCUTANEOUS at 21:18

## 2020-12-10 RX ADMIN — POTASSIUM CHLORIDE 20 MEQ: 20 TABLET, EXTENDED RELEASE ORAL at 21:17

## 2020-12-10 RX ADMIN — ACETAMINOPHEN 650 MG: 325 TABLET ORAL at 21:24

## 2020-12-10 RX ADMIN — SERTRALINE HYDROCHLORIDE 125 MG: 50 TABLET ORAL at 21:17

## 2020-12-10 ASSESSMENT — PAIN DESCRIPTION - LOCATION: LOCATION: HEAD

## 2020-12-10 ASSESSMENT — PAIN SCALES - GENERAL
PAINLEVEL_OUTOF10: 0
PAINLEVEL_OUTOF10: 2
PAINLEVEL_OUTOF10: 0
PAINLEVEL_OUTOF10: 5

## 2020-12-10 ASSESSMENT — PAIN DESCRIPTION - FREQUENCY: FREQUENCY: INTERMITTENT

## 2020-12-10 ASSESSMENT — PAIN DESCRIPTION - DESCRIPTORS: DESCRIPTORS: HEADACHE

## 2020-12-10 NOTE — PLAN OF CARE
Problem: Falls - Risk of:  Goal: Will remain free from falls  Description: Will remain free from falls  Outcome: Ongoing  Note: Pt high fall risk. Instructed to use call light before getting out of bed. Call light within reach. Bed in low position. Bed alarm on. Will continue to monitor. Problem: Skin Integrity:  Goal: Will show no infection signs and symptoms  Description: Will show no infection signs and symptoms  Outcome: Ongoing  Note: Patient's skin assessed. See flowsheet. Patient turned in bed. Pressure ulcer prevention in place. No issues with skin integrity this shift. Will continue to monitor. Problem: Pain:  Goal: Pain level will decrease  Description: Pain level will decrease  Outcome: Ongoing  Note: Patient's pain level controlled at this time. Will continue to monitor. Problem: HEMODYNAMIC STATUS  Goal: Patient has stable vital signs and fluid balance  Outcome: Ongoing  Note:   Patient's EF (Ejection Fraction) is greater than 40%    Heart Failure Medications:  Diuretics[de-identified] Furosemide and Spironolactone    (One of the following REQUIRED for EF <40%/SYSTOLIC FAILURE but MAY be used in EF% >40%/DIASTOLIC FAILURE)        ACE[de-identified] None        ARB[de-identified] None         ARNI[de-identified] None    (Beta Blockers)  NON- Evidenced Based Beta Blocker (for EF% >40%/DIASTOLIC FAILURE): None    Evidenced Based Beta Blocker::(REQUIRED for EF% <40%/SYSTOLIC FAILURE) None  . .................................................................................................................................................. Patient's weights and intake/output reviewed: Yes    Patient's Last Weight: 339 lbs obtained by bed scale. Difference is uncertain from last documented weight.       Intake/Output Summary (Last 24 hours) at 12/10/2020 1103  Last data filed at 12/10/2020 0914  Gross per 24 hour   Intake 1210 ml   Output 2600 ml   Net -1390 ml       Comorbidities Reviewed Yes    Patient has a past medical history of Anxiety, Arthritis, Atrial fibrillation (HCC), CAD (coronary artery disease), CHF (congestive heart failure) ([address] Chronic pain syndrome, Cirrhosis ([address] Depression, Diabetes mellitus ([address] GERD (gastroesophageal reflux disease), Hyperlipidemia, Hypertension, Kidney disease, Thyroid disease, and Vitamin D deficiency. >>For CHF and Comorbidity documentation on Education Time and Topics, please see Education Tab    Progressive Mobility Assessment:  What is this patient's Current Level of Mobility?: Ambulatory- with Assistance  How was this patient Mobilized today?: Edge of Bed, Up to Chair, Bedside Commode, Up in Room, and Patient Refuses to Mobilize                 With Whom? Nurse, PCA, PT, and OT                 Level of Difficulty/Assistance: 2x Assist     Pt resting in bed at this time on room air. Pt denies shortness of breath. Pt with pitting lower extremity edema. Patient and/or Family's stated Goal of Care this Admission: increase activity tolerance, better understand heart failure and disease management, be more comfortable, and reduce lower extremity edema prior to discharge        :       Problem: Metabolic:  Goal: Ability to maintain appropriate glucose levels will improve  Description: Ability to maintain appropriate glucose levels will improve  Outcome: Ongoing  Note: Diabetes education provided today:    Metabolic syndrome: association of diabetes with dyslipidemia, HTN and obesity. Diabetic Neuropathy: signs and therapy. Diabetic nephropathy: Kidney function, microalbumin as a sign of diabetic nephropathy. Stages of kidney disease. Nutrition as a mainstream of diabetes therapy. [place] about label reading. Carbs: good carbs and bad carbs, importance of carb counting, incorporation of protein with each meal to reduce Glycemic index, importance of portions, Carb/insulin ratio. Fats: Good fats and bad fats, meal planning and supplements.   Physical activity: advised to exercise 5-7 days a week 30-60 mins at least. Discussed how it affects BS readings. Managing high and low sugar readings.

## 2020-12-10 NOTE — PROGRESS NOTES
Occupational Therapy  Facility/Department: [place] [address] REMOTE TELEMETRY  Daily Treatment Note  NAME: Ariadna Grider  : 1950  MRN: [id number]    Date of Service: 12/10/2020    Discharge Recommendations:  ECF with OT       Assessment   Performance deficits / Impairments: Decreased functional mobility ; Decreased ADL status; Decreased safe awareness;Decreased cognition;Decreased endurance;Decreased balance  Assessment: Cotx with PT for increased mobility and safety this date. However, pt is not motivated to participate in therapy. Pt requires increased verbal cuing and encouragement in order to participate in therapeutic exercises. Pt assisted with scooting to EOB. Pt requires seated rest break once sitting EOB. RN asked therapist to perform standing weight on pt. Pt requires increased time to complete sit to stand transfer. Educated pt on importance of use of gait belt. Cues for hand placement in order to complete transfer. Pt not willing to use RW for UE support upon standing. Pt non compliant with therapists' instructions. Pt attempted to stand by grabbing hold of scale. Pt provided pt with Ax 2 in order complete stand, assistance with pt positoning self on top of scale. Pt requires MAX A x 2 in order to scoot to [facility] while supine via reverse trendelenburg. Pt will continue to benefit from increased skilled OT in order to achieve therapy goals. Activity Tolerance  Activity Tolerance: Pt attempts to talk her way out of therapy. Pt makes several excuses why she can not participate in therapy  Safety Devices  Safety Devices in place: Yes  Type of devices: Left in bed;Bed alarm in place;Call light within reach;Nurse notified;Gait belt         Patient Diagnosis(es): The primary encounter diagnosis was Acute kidney injury superimposed on chronic kidney disease ([facility]  Diagnoses of Anasarca, Liver cirrhosis secondary to LARRY (nonalcoholic steatohepatitis) ([facility] Cirrhosis of liver with ascites, unspecified hepatic cirrhosis type [facility]), and Symptomatic anemia were also pertinent to this visit. has a past medical history of Anxiety, Arthritis, Atrial fibrillation ([facility] CAD (coronary artery disease), CHF (congestive heart failure) ([facility] Chronic pain syndrome, Cirrhosis ([facility] Depression, Diabetes mellitus ([facility] GERD (gastroesophageal reflux disease), Hyperlipidemia, Hypertension, Kidney disease, Thyroid disease, and Vitamin D deficiency. has a past surgical history that includes Coronary Artery Bypass Graft Maze Procedure (1995); Cholecystectomy; and Breast reduction surgery. Restrictions  Restrictions/Precautions  Restrictions/Precautions: Fall Risk, Up as Tolerated  Position Activity Restriction  Other position/activity restrictions: morgan HERNANDEZ  Subjective   General  Chart Reviewed: Yes  Patient assessed for rehabilitation services?: Yes  Family / Caregiver Present: No  Referring Practitioner: PRATIMA Monroe  Diagnosis: fluid overload  Subjective  Subjective: Pt resting in bed, agreeable to OT with some encouragement  General Comment  Comments: RN approved therapy  Vital Signs  Patient Currently in Pain: Denies   Orientation  Orientation  Overall Orientation Status: Within Normal Limits  Objective             Balance  Standing Balance: Moderate assistance(MIN A  2)  Standing Balance  Time: 30-45 seconds  Activity: standing on scale for weight     Transfers  Sit to stand: Moderate assistance;2 Person assistance  Stand to sit: Moderate assistance;2 Person assistance                       Cognition  Overall Cognitive Status: Exceptions  Following Commands:  Follows one step commands consistently  Attention Span: Attends with cues to redirect  Safety Judgement: Decreased awareness of need for safety  Problem Solving: Assistance required to correct errors made;Assistance required to identify errors made;Decreased awareness of errors                    Type of ROM/Therapeutic Exercise  Type of ROM/Therapeutic Exercise:

## 2020-12-10 NOTE — PROGRESS NOTES
End of shift report given to [employer], RN. Bed in lowest position with wheels locked. Call light within reach.  No further needs at this time. William Liz

## 2020-12-10 NOTE — PROGRESS NOTES
mellitus ([address] GERD (gastroesophageal reflux disease), Hyperlipidemia, Hypertension, Kidney disease, Thyroid disease, and Vitamin D deficiency. has a past surgical history that includes Coronary Artery Bypass Graft Maze Procedure (1995); Cholecystectomy; and Breast reduction surgery. Restrictions  Restrictions/Precautions  Restrictions/Precautions: Fall Risk, Up as Tolerated  Position Activity Restriction  Other position/activity restrictions: IV, mora  Subjective   General  Chart Reviewed: Yes  Response To Previous Treatment: Patient with no complaints from previous session. Family / Caregiver Present: No  Referring Practitioner: Jasson Grant MD  Subjective  Subjective: pt resting inbed. Resistant to activity. Denies pain at rest  General Comment  Comments: cleared by nursing          Orientation  Orientation  Overall Orientation Status: Within Normal Limits  Cognition      Objective   Bed mobility  Supine to Sit: Moderate assistance;2 Person assistance  Sit to Supine: 2 Person assistance;Maximum assistance  Transfers  Sit to Stand: 2 Person Assistance;Minimal Assistance  Stand to sit: 2 Person Assistance;Minimal Assistance  Ambulation  Ambulation?: Yes(1 step up to scale, 1 step back)     Balance  Posture: Fair  Sitting - Static: Fair  Sitting - Dynamic: Fair  Standing - Static: Fair  Standing - Dynamic: Fair  Exercises  Quad Sets: x10 B  Ankle Pumps: 15 B                  AM-PAC Score  AM-PAC Inpatient Mobility Raw Score : 13 (12/09/20 1035)  AM-PAC Inpatient T-Scale Score : 36.74 (12/09/20 1035)  Mobility Inpatient CMS 0-100% Score: 64.91 (12/09/20 1035)  Mobility Inpatient CMS G-Code Modifier : CL (12/09/20 1035)          Goals  Short term goals  Time Frame for Short term goals: 12/15  Short term goal 1: Pt will complete bed mobility wtih min A. 12/10: mod x 2  Short term goal 2: Pt will sit to stand wtih SBA. 12/10: Min x 2  Short term goal 3: Pt will ambulate x 5' with RW with CGA.  12/10: 1 step with min A x 2  Short term goal 4: Pt will sit in a chair x 60 minutes. 12/10: refused to sit  Short term goal 5: Pt will participate in B LE exercises to improve functional strength and mobility by 12/11. 12/10: completed 2 LE exercises  Patient Goals   Patient goals : none expressed    Plan    Plan  Times per week: 2-4x/week  Current Treatment Recommendations: Strengthening, Balance Training, Endurance Training, Functional Mobility Training, Transfer Training, Gait Training, Positioning  Safety Devices  Type of devices:  All fall risk precautions in place, Bed alarm in place, Gait belt, Patient at risk for falls, Left in bed, Nurse notified, Call light within reach  Restraints  Initially in place: No     Therapy Time   Individual Concurrent Group Co-treatment   Time In 1420         Time Out 1500         Minutes [address], PT

## 2020-12-10 NOTE — PROGRESS NOTES
Paged Dr. Brooke Dawn;  12/10/20 10:49 AM   [phone] Hospital or Facility: [facility] From: Edinson Hinojosa RE: Stu Denis 1950 RM: 104 FYI pt mag is 1.70, K 3.1. Please advise, thank you!

## 2020-12-10 NOTE — PLAN OF CARE
Problem: Metabolic:  Goal: Ability to maintain appropriate glucose levels will improve  Description: Ability to maintain appropriate glucose levels will improve  Intervention: Manage blood glucose measurement  Note: Educated on the importance of monitoring glucose level  and following carb control diet with the diagnosis of CHF. Taught about signs and symptoms of hypo and hyperglycemia and when to seek medical attention if blood sugar is too high or too low     Problem: HEMODYNAMIC STATUS  Goal: Patient has stable vital signs and fluid balance  Intervention: MONITOR PATIENT'S WEIGHT  Note: Pt instructed about the importance of lasix gtt in order to rid her body of excess fluid. Pt also educated on the importance of weighing herself daily to see if there is any weight gain of 2-3lbs overnight or 5-7lbs in  week and to notify MD.      Patient's EF (Ejection Fraction) is greater than 40%    Heart Failure Medications:  Diuretics[de-identified] Furosemide and Spironolactone    (One of the following REQUIRED for EF <40%/SYSTOLIC FAILURE but MAY be used in EF% >40%/DIASTOLIC FAILURE)        ACE[de-identified] None        ARB[de-identified] None         ARNI[de-identified] None    (Beta Blockers)  NON- Evidenced Based Beta Blocker (for EF% >40%/DIASTOLIC FAILURE): None    Evidenced Based Beta Blocker::(REQUIRED for EF% <40%/SYSTOLIC FAILURE) None  . .................................................................................................................................................. Patient's weights and intake/output reviewed: Yes    Patient's Last Weight: 363 lbs obtained by standing scale. Difference is unceratin d/t pt refusing weight.        Intake/Output Summary (Last 24 hours) at 12/10/2020 5953  Last data filed at 12/10/2020 0331  Gross per 24 hour   Intake 1210 ml   Output 2725 ml   Net -1515 ml       Comorbidities Reviewed Yes    Patient has a past medical history of Anxiety, Arthritis, Atrial fibrillation ([address] CAD (coronary artery disease), CHF

## 2020-12-10 NOTE — CARE COORDINATION
Case Management/Follow up:    Chart reviewed for length of stay. Hospital day #  5     Unit:  A-1     Diagnosis and current status as per MD progress: acute on chronic CHF- lasix gtt, spironolactone. DIONNE on CKD3. Anticipated d/c date: possible over the w/e per MD during rounds. Expected plan for discharge: [address] [place] [address]. Rapid covid at d/c. Potential barriers: none identified    Precert required/date initiated:n/a    Confirmed plan with patient and/or family:yes, previously    Comments: following.

## 2020-12-10 NOTE — PROGRESS NOTES
Hospitalist Progress Note      PCP: Austin Day MD    Date of Admission: 12/4/2020      Chief Complaint: Bilateral leg edema and weight gain        Subjective:  continues to have good UOP, states she had weight checked today, no overnight events       Medications:  Reviewed    Infusion Medications    dextrose      furosemide (LASIX) 1mg/ml infusion 15 mg/hr (12/10/20 1270)     Scheduled Medications    spironolactone  25 mg Oral Daily    fluticasone  1 spray Each Nostril Daily    amLODIPine  10 mg Oral Daily    apixaban  5 mg Oral BID    atorvastatin  20 mg Oral Daily    insulin glargine  15 Units Subcutaneous Nightly    levothyroxine  125 mcg Oral Daily    pantoprazole  40 mg Oral Daily    sertraline  125 mg Oral Nightly    sodium chloride flush  10 mL Intravenous 2 times per day    insulin lispro  0-12 Units Subcutaneous TID WC    insulin lispro  0-6 Units Subcutaneous Nightly    tiZANidine  4 mg Oral BID     PRN Meds: sodium chloride flush, acetaminophen **OR** acetaminophen, polyethylene glycol, promethazine **OR** ondansetron, glucose, dextrose, glucagon (rDNA), dextrose, perflutren lipid microspheres, traMADol, carboxymethylcellulose PF, sodium chloride flush      Intake/Output Summary (Last 24 hours) at 12/10/2020 0831  Last data filed at 12/10/2020 0645  Gross per 24 hour   Intake 1080 ml   Output 2075 ml   Net -995 ml       Physical Exam Performed:    /72   Pulse 74   Temp 98.1 °F (36.7 °C) (Oral)   Resp 16   Ht 5' 5\" (1.651 m)   Wt (!) 339 lb 8.1 oz (154 kg)   SpO2 91%   BMI 56.50 kg/m²          General appearance: No apparent distress, appears stated age and cooperative. obese  HEENT: Pupils equal, round, and reactive to light. Conjunctivae/corneas clear. Neck: Supple, with full range of motion. No jugular venous distention. Trachea midline. Respiratory:  Normal respiratory effort.  Clear to auscultation, bilaterally without Wheezes/Rhonchi.  Faint rales.   Cardiovascular: Regular rate and rhythm with normal S1/S2 without murmurs, rubs or gallops. Abdomen: Soft, non-tender, non-distended with normal bowel sounds. Musculoskeletal: No clubbing, cyanosis.  significant Pitting anasarca.  Full range of motion without deformity. Skin: Skin color, texture, turgor normal.  No rashes or lesions. Neurologic:  Neurovascularly intact without any focal sensory/motor deficits. Cranial nerves: II-XII intact, grossly non-focal.  Psychiatric: Alert and oriented, thought content appropriate, normal insight  Capillary Refill: Brisk,< 3 seconds   Peripheral Pulses: +2 palpable, equal bilaterally     Labs:   Recent Labs     12/09/20  0839   WBC 4.5   HGB 8.1*   HCT 26.2*        Recent Labs     12/08/20  0652 12/09/20  0720    138   K 3.7 3.7    104   CO2 26 25   BUN 40* 40*   CREATININE 1.3* 1.3*   CALCIUM 9.3 9.3     No results for input(s): AST, ALT, BILIDIR, BILITOT, ALKPHOS in the last 72 hours. No results for input(s): INR in the last 72 hours. No results for input(s): Shaq Altes in the last 72 hours. Urinalysis:      Lab Results   Component Value Date    NITRU Negative 12/05/2020    WBCUA 3-5 12/05/2020    BACTERIA 2+ 08/20/2020    RBCUA 5-10 12/05/2020    BLOODU SMALL 12/05/2020    SPECGRAV 1.025 12/05/2020    GLUCOSEU Negative 12/05/2020       Radiology:  CT ABDOMEN PELVIS WO CONTRAST Additional Contrast? None   Final Result   1. Findings of diffuse volume overload including abdominal and pelvic   ascites, anasarca and findings of congestive heart failure in the lower lungs. 2. Hepatic morphologic features of cirrhosis. No gross hepatic lesion   evident. 3. Splenomegaly, typically associated with portal venous hypertension but   nonspecific. 4. Mild right upper quadrant lymph node enlargement is very likely reactive. XR CHEST PORTABLE   Final Result   Vascular congestion. No acute pulmonary disease.       Stable enlargement of cardiac silhouette. Assessment/Plan:    Active Hospital Problems    Diagnosis    Acute on chronic diastolic CHF (congestive heart failure) ([facility]) [I50.33]    Morbid obesity ([facility] [E66.01]    Fluid overload [E87.70]    CHF (congestive heart failure) ([facility]) [I50.9]    CAD (coronary artery disease) [I25.10]    Acute kidney injury superimposed on chronic kidney disease ([facility] [N17.9, N18.9]    Essential hypertension [I10]    Type 2 diabetes mellitus, with long-term current use of insulin ([facility]) [E11.9, Z79.4]    Stage 3 chronic kidney disease [N18.30]          \"70 y.o. female, with past medical history of CHF, morbid obesity, diabetes, hypertension, CAD, hyperlipidemia and atrial fibrillation, who presented to [facility] with bilateral leg edema and weight gain. The patient is a resident of [address] [place] [address]. Bhupinder Zuñiga stated over the last 4 months she has gained roughly 74 pounds. Bhupinder Zuñiga stated most of her weight is in her abdomen and bilateral legs.  Patient stated she has a history of Illa Neigh as well as CKD stage III.  She stated she sees  from nephrology at [facility] in [facility].  Per EMR, in 2018 the patient had 8.5 L removed through paracentesis while admitted at [address] [place] [address] Ne the emergency room patient had a chest x-ray that revealed vascular congestion.  She also had a CT abdomen pelvis that revealed findings of diffuse volume overload including abdominal and pelvic ascites, anasarca and findings consistent of congestive heart failure in the lower lungs. \"         Acute on chronic diastolic CHF  - furosemide gtt (increased rate), added spironolactone.  She will need to be more compliant with her outpatient diuretic regimen - she admits to refusing meds because she couldn't make it to the bathroom in time.  Staff there will need to focus on helping her to the bathroom quickly when she asks.   - at the time of last discharge on 8/23/20, she weighed 242 lbs on a standing scale.  I reviewed the last couple years of her admissions and her claims that she usually diureses from 360 lbs down to 190 do not seem to be accurate.    - F/u Echo(ef 55%, no wm abn, rv enlarged, mod tr, severe pulm htn)  - no ARB/ARNI due to DIONNE, consider as outpatient  - she is on amlodipine.    - need to add metoprolol when she is closer to euvolemic     DIONNE on CKD3  - due to cardiorenal syndrome.  Baseline Cr perhaps about 1.3.  Monitored     pAfib- stable  -on apixaban(eliquis)     DM2  - insulin regimen     Chronic normocytic anemia  - 1u pRBCs on 12/5     HLD  - statin     Morbid obesity  With Body mass index is 52.4 kg/m². Complicating assessment and treatment. Placing patient at risk for multiple co-morbidities as well as early death and contributing to the patient's presentation.  Counseled on weight loss         DVT Prophylaxis: on eliquis  Diet: DIET LOW SODIUM 2 GM; 2000 ml  Code Status: Full Code       PT/OT Eval Status: ordered 12/9 and rec ecf with pt/ot    Dispo - when adequately diuresed. Andie Nunez 12/13.  She lives at [address] [place]

## 2020-12-11 LAB
ALBUMIN SERPL-MCNC: 3.5 G/DL (ref 3.4–5)
ANION GAP SERPL CALCULATED.3IONS-SCNC: 7 MMOL/L (ref 3–16)
BASOPHILS ABSOLUTE: 0 K/UL (ref 0–0.2)
BASOPHILS RELATIVE PERCENT: 0.3 %
BUN BLDV-MCNC: 41 MG/DL (ref 7–20)
CALCIUM SERPL-MCNC: 9.6 MG/DL (ref 8.3–10.6)
CHLORIDE BLD-SCNC: 103 MMOL/L (ref 99–110)
CO2: 29 MMOL/L (ref 21–32)
CREAT SERPL-MCNC: 1.4 MG/DL (ref 0.6–1.2)
EOSINOPHILS ABSOLUTE: 0.1 K/UL (ref 0–0.6)
EOSINOPHILS RELATIVE PERCENT: 3.1 %
GFR AFRICAN AMERICAN: 45
GFR NON-AFRICAN AMERICAN: 37
GLUCOSE BLD-MCNC: 140 MG/DL (ref 70–99)
GLUCOSE BLD-MCNC: 156 MG/DL (ref 70–99)
GLUCOSE BLD-MCNC: 202 MG/DL (ref 70–99)
GLUCOSE BLD-MCNC: 233 MG/DL (ref 70–99)
GLUCOSE BLD-MCNC: 235 MG/DL (ref 70–99)
HCT VFR BLD CALC: 25.3 % (ref 36–48)
HEMOGLOBIN: 7.8 G/DL (ref 12–16)
LYMPHOCYTES ABSOLUTE: 0.4 K/UL (ref 1–5.1)
LYMPHOCYTES RELATIVE PERCENT: 11.4 %
MAGNESIUM: 1.9 MG/DL (ref 1.8–2.4)
MCH RBC QN AUTO: 25.6 PG (ref 26–34)
MCHC RBC AUTO-ENTMCNC: 30.8 G/DL (ref 31–36)
MCV RBC AUTO: 83.3 FL (ref 80–100)
MONOCYTES ABSOLUTE: 0.3 K/UL (ref 0–1.3)
MONOCYTES RELATIVE PERCENT: 8 %
NEUTROPHILS ABSOLUTE: 2.5 K/UL (ref 1.7–7.7)
NEUTROPHILS RELATIVE PERCENT: 77.2 %
PDW BLD-RTO: 16.5 % (ref 12.4–15.4)
PERFORMED ON: ABNORMAL
PHOSPHORUS: 3.1 MG/DL (ref 2.5–4.9)
PLATELET # BLD: 143 K/UL (ref 135–450)
PMV BLD AUTO: 7.5 FL (ref 5–10.5)
POTASSIUM SERPL-SCNC: 3.6 MMOL/L (ref 3.5–5.1)
RBC # BLD: 3.04 M/UL (ref 4–5.2)
SODIUM BLD-SCNC: 139 MMOL/L (ref 136–145)
WBC # BLD: 3.3 K/UL (ref 4–11)

## 2020-12-11 PROCEDURE — 6360000002 HC RX W HCPCS: Performed by: INTERNAL MEDICINE

## 2020-12-11 PROCEDURE — 6370000000 HC RX 637 (ALT 250 FOR IP): Performed by: INTERNAL MEDICINE

## 2020-12-11 PROCEDURE — 83735 ASSAY OF MAGNESIUM: CPT

## 2020-12-11 PROCEDURE — 2580000003 HC RX 258: Performed by: INTERNAL MEDICINE

## 2020-12-11 PROCEDURE — 6370000000 HC RX 637 (ALT 250 FOR IP): Performed by: NURSE PRACTITIONER

## 2020-12-11 PROCEDURE — 1200000000 HC SEMI PRIVATE

## 2020-12-11 PROCEDURE — 36415 COLL VENOUS BLD VENIPUNCTURE: CPT

## 2020-12-11 PROCEDURE — 85025 COMPLETE CBC W/AUTO DIFF WBC: CPT

## 2020-12-11 PROCEDURE — 80069 RENAL FUNCTION PANEL: CPT

## 2020-12-11 PROCEDURE — 2580000003 HC RX 258: Performed by: NURSE PRACTITIONER

## 2020-12-11 RX ADMIN — PANTOPRAZOLE SODIUM 40 MG: 40 TABLET, DELAYED RELEASE ORAL at 09:22

## 2020-12-11 RX ADMIN — POTASSIUM CHLORIDE 20 MEQ: 20 TABLET, EXTENDED RELEASE ORAL at 09:22

## 2020-12-11 RX ADMIN — SPIRONOLACTONE 25 MG: 25 TABLET ORAL at 09:21

## 2020-12-11 RX ADMIN — LEVOTHYROXINE SODIUM 125 MCG: 0.12 TABLET ORAL at 05:03

## 2020-12-11 RX ADMIN — CARBOXYMETHYLCELLULOSE SODIUM 1 DROP: 10 GEL OPHTHALMIC at 22:00

## 2020-12-11 RX ADMIN — FLUTICASONE PROPIONATE 1 SPRAY: 50 SPRAY, METERED NASAL at 09:27

## 2020-12-11 RX ADMIN — AMLODIPINE BESYLATE 10 MG: 5 TABLET ORAL at 09:21

## 2020-12-11 RX ADMIN — FUROSEMIDE 15 MG/HR: 10 INJECTION, SOLUTION INTRAMUSCULAR; INTRAVENOUS at 05:41

## 2020-12-11 RX ADMIN — POTASSIUM CHLORIDE 20 MEQ: 20 TABLET, EXTENDED RELEASE ORAL at 20:33

## 2020-12-11 RX ADMIN — SODIUM CHLORIDE, PRESERVATIVE FREE 10 ML: 5 INJECTION INTRAVENOUS at 09:22

## 2020-12-11 RX ADMIN — INSULIN LISPRO 2 UNITS: 100 INJECTION, SOLUTION INTRAVENOUS; SUBCUTANEOUS at 09:23

## 2020-12-11 RX ADMIN — APIXABAN 5 MG: 5 TABLET, FILM COATED ORAL at 09:22

## 2020-12-11 RX ADMIN — FUROSEMIDE 10 MG/HR: 10 INJECTION, SOLUTION INTRAMUSCULAR; INTRAVENOUS at 13:28

## 2020-12-11 RX ADMIN — INSULIN LISPRO 4 UNITS: 100 INJECTION, SOLUTION INTRAVENOUS; SUBCUTANEOUS at 13:07

## 2020-12-11 RX ADMIN — SERTRALINE HYDROCHLORIDE 125 MG: 50 TABLET ORAL at 20:33

## 2020-12-11 RX ADMIN — INSULIN LISPRO 2 UNITS: 100 INJECTION, SOLUTION INTRAVENOUS; SUBCUTANEOUS at 21:56

## 2020-12-11 RX ADMIN — INSULIN LISPRO 4 UNITS: 100 INJECTION, SOLUTION INTRAVENOUS; SUBCUTANEOUS at 18:19

## 2020-12-11 RX ADMIN — TIZANIDINE 4 MG: 4 TABLET ORAL at 20:33

## 2020-12-11 RX ADMIN — APIXABAN 5 MG: 5 TABLET, FILM COATED ORAL at 20:33

## 2020-12-11 RX ADMIN — TIZANIDINE 4 MG: 4 TABLET ORAL at 09:21

## 2020-12-11 RX ADMIN — ATORVASTATIN CALCIUM 20 MG: 10 TABLET, FILM COATED ORAL at 09:22

## 2020-12-11 RX ADMIN — SODIUM CHLORIDE, PRESERVATIVE FREE 10 ML: 5 INJECTION INTRAVENOUS at 20:34

## 2020-12-11 RX ADMIN — INSULIN GLARGINE 15 UNITS: 100 INJECTION, SOLUTION SUBCUTANEOUS at 21:56

## 2020-12-11 ASSESSMENT — PAIN DESCRIPTION - LOCATION: LOCATION: HAND

## 2020-12-11 ASSESSMENT — PAIN DESCRIPTION - ORIENTATION: ORIENTATION: RIGHT;LEFT

## 2020-12-11 ASSESSMENT — PAIN SCALES - GENERAL: PAINLEVEL_OUTOF10: 1

## 2020-12-11 NOTE — PROGRESS NOTES
Hospitalist Progress Note      PCP: Nereida Morel MD    Date of Admission: 12/4/2020       Chief Complaint: Bilateral leg edema and weight gain      Subjective:  continues to have good UOP, resting w/o issues,  no overnight events, slight bump in cr noted       Medications:  Reviewed    Infusion Medications    dextrose      furosemide (LASIX) 1mg/ml infusion 15 mg/hr (12/11/20 0541)     Scheduled Medications    potassium chloride  20 mEq Oral BID    spironolactone  25 mg Oral Daily    fluticasone  1 spray Each Nostril Daily    amLODIPine  10 mg Oral Daily    apixaban  5 mg Oral BID    atorvastatin  20 mg Oral Daily    insulin glargine  15 Units Subcutaneous Nightly    levothyroxine  125 mcg Oral Daily    pantoprazole  40 mg Oral Daily    sertraline  125 mg Oral Nightly    sodium chloride flush  10 mL Intravenous 2 times per day    insulin lispro  0-12 Units Subcutaneous TID WC    insulin lispro  0-6 Units Subcutaneous Nightly    tiZANidine  4 mg Oral BID     PRN Meds: sodium chloride flush, acetaminophen **OR** acetaminophen, polyethylene glycol, promethazine **OR** ondansetron, glucose, dextrose, glucagon (rDNA), dextrose, perflutren lipid microspheres, traMADol, carboxymethylcellulose PF, sodium chloride flush      Intake/Output Summary (Last 24 hours) at 12/11/2020 0755  Last data filed at 12/11/2020 0445  Gross per 24 hour   Intake 1685 ml   Output 1575 ml   Net 110 ml       Physical Exam Performed:    BP (!) 137/99   Pulse 75   Temp 98.1 °F (36.7 °C) (Oral)   Resp 15   Ht 5' 5\" (1.651 m)   Wt (!) 340 lb 9.6 oz (154.5 kg)   SpO2 91%   BMI 56.68 kg/m²     General appearance: No apparent distress, appears stated age and cooperative. obese  HEENT: Pupils equal, round, and reactive to light. Conjunctivae/corneas clear. Neck: Supple, with full range of motion. No jugular venous distention. Trachea midline. Respiratory:  Normal respiratory effort.  Clear to auscultation, bilaterally without Wheezes/Rhonchi.  Faint rales.   Cardiovascular: Regular rate and rhythm with normal S1/S2 without murmurs, rubs or gallops. Abdomen: Soft, non-tender, non-distended with normal bowel sounds. Musculoskeletal: No clubbing, cyanosis.  significant Pitting anasarca.  Full range of motion without deformity. Skin: Skin color, texture, turgor normal.  No rashes or lesions. Neurologic:  Neurovascularly intact without any focal sensory/motor deficits. Cranial nerves: II-XII intact, grossly non-focal.  Psychiatric: Alert and oriented, thought content appropriate, normal insight  Capillary Refill: Brisk,< 3 seconds   Peripheral Pulses: +2 palpable, equal bilaterally     Labs:   Recent Labs     12/09/20  0839 12/11/20  0510   WBC 4.5 3.3*   HGB 8.1* 7.8*   HCT 26.2* 25.3*    143     Recent Labs     12/09/20  0720 12/10/20  0857 12/11/20  0510    139 139   K 3.7 3.1* 3.6    103 103   CO2 25 28 29   BUN 40* 40* 41*   CREATININE 1.3* 1.2 1.4*   CALCIUM 9.3 9.6 9.6   PHOS  --  3.0 3.1     No results for input(s): AST, ALT, BILIDIR, BILITOT, ALKPHOS in the last 72 hours. No results for input(s): INR in the last 72 hours. No results for input(s): Gwendolyn Breeze in the last 72 hours. Urinalysis:      Lab Results   Component Value Date    NITRU Negative 12/05/2020    WBCUA 3-5 12/05/2020    BACTERIA 2+ 08/20/2020    RBCUA 5-10 12/05/2020    BLOODU SMALL 12/05/2020    SPECGRAV 1.025 12/05/2020    GLUCOSEU Negative 12/05/2020       Radiology:  CT ABDOMEN PELVIS WO CONTRAST Additional Contrast? None   Final Result   1. Findings of diffuse volume overload including abdominal and pelvic   ascites, anasarca and findings of congestive heart failure in the lower lungs. 2. Hepatic morphologic features of cirrhosis. No gross hepatic lesion   evident. 3. Splenomegaly, typically associated with portal venous hypertension but   nonspecific.    4. Mild right upper quadrant lymph node enlargement is very likely reactive. XR CHEST PORTABLE   Final Result   Vascular congestion. No acute pulmonary disease. Stable enlargement of cardiac silhouette. Assessment/Plan:    Active Hospital Problems    Diagnosis    Acute on chronic diastolic CHF (congestive heart failure) ([facility]) [I50.33]    Morbid obesity ([facility] [E66.01]    Fluid overload [E87.70]    CHF (congestive heart failure) ([facility]) [I50.9]    CAD (coronary artery disease) [I25.10]    Acute kidney injury superimposed on chronic kidney disease ([facility] [N17.9, N18.9]    Essential hypertension [I10]    Type 2 diabetes mellitus, with long-term current use of insulin ([facility]) [E11.9, Z79.4]    Stage 3 chronic kidney disease [N18.30]            \"70 y.o. female, with past medical history of CHF, morbid obesity, diabetes, hypertension, CAD, hyperlipidemia and atrial fibrillation, who presented to [facility] with bilateral leg edema and weight gain. The patient is a resident of [address] [place] [address]. Amandeep Mar stated over the last 4 months she has gained roughly 74 pounds. Amandeep Mar stated most of her weight is in her abdomen and bilateral legs.  Patient stated she has a history of Becker Seth as well as CKD stage III.  She stated she sees  from nephrology at [facility] in [facility].  Per EMR, in 2018 the patient had 8.5 L removed through paracentesis while admitted at [address] [place] [address] Ne the emergency room patient had a chest x-ray that revealed vascular congestion.  She also had a CT abdomen pelvis that revealed findings of diffuse volume overload including abdominal and pelvic ascites, anasarca and findings consistent of congestive heart failure in the lower lungs. \"         Acute on chronic diastolic CHF  - furosemide gtt (increased rate), added spironolactone.  She will need to be more compliant with her outpatient diuretic regimen - she admits to refusing meds because she couldn't make it to the bathroom in time.  Staff there will need to focus on helping her to the bathroom quickly when she asks. - at the time of last discharge on 8/23/20, she weighed 242 lbs on a standing scale.  I reviewed the last couple years of her admissions and her claims that she usually diureses from 360 lbs down to 190 do not seem to be accurate.    - F/u Echo(ef 55%, no wm abn, rv enlarged, mod tr, severe pulm htn)  - no ARB/ARNI due to DIONNE, consider as outpatient  - she is on amlodipine.    - need to add metoprolol when she is closer to euvolemic     DIONNE on CKD3  - due to cardiorenal syndrome.  Baseline Cr perhaps about 1.3.  Monitored     pAfib- stable  -on apixaban(eliquis)     DM2  - insulin regimen     Chronic normocytic anemia  - 1u pRBCs on 12/5     HLD  - statin     Morbid obesity  With Body mass index is 52.4 kg/m². Complicating assessment and treatment. Placing patient at risk for multiple co-morbidities as well as early death and contributing to the patient's presentation.  Counseled on weight loss         DVT Prophylaxis: on eliquis  Diet: DIET LOW SODIUM 2 GM; 2000 ml  Code Status: Full Code    PT/OT Eval Status: ordered 12/9 and rec ecf with pt/ot     Dispo - when adequately diuresed. Mahsa Bacon 12/13.  She lives at [facility], [place]

## 2020-12-11 NOTE — PROGRESS NOTES
End of shift report given to Joshua Lyons RN. Bed in lowest position with wheels locked. Call light within reach.  No further needs at this time. Dory Barraza

## 2020-12-12 LAB
ALBUMIN SERPL-MCNC: 3.5 G/DL (ref 3.4–5)
ANION GAP SERPL CALCULATED.3IONS-SCNC: 9 MMOL/L (ref 3–16)
BUN BLDV-MCNC: 37 MG/DL (ref 7–20)
CALCIUM SERPL-MCNC: 9.7 MG/DL (ref 8.3–10.6)
CHLORIDE BLD-SCNC: 101 MMOL/L (ref 99–110)
CO2: 28 MMOL/L (ref 21–32)
CREAT SERPL-MCNC: 1.2 MG/DL (ref 0.6–1.2)
GFR AFRICAN AMERICAN: 54
GFR NON-AFRICAN AMERICAN: 45
GLUCOSE BLD-MCNC: 132 MG/DL (ref 70–99)
GLUCOSE BLD-MCNC: 159 MG/DL (ref 70–99)
GLUCOSE BLD-MCNC: 184 MG/DL (ref 70–99)
GLUCOSE BLD-MCNC: 195 MG/DL (ref 70–99)
GLUCOSE BLD-MCNC: 206 MG/DL (ref 70–99)
MAGNESIUM: 1.7 MG/DL (ref 1.8–2.4)
PERFORMED ON: ABNORMAL
PHOSPHORUS: 2.8 MG/DL (ref 2.5–4.9)
POTASSIUM SERPL-SCNC: 3.7 MMOL/L (ref 3.5–5.1)
SODIUM BLD-SCNC: 138 MMOL/L (ref 136–145)

## 2020-12-12 PROCEDURE — 6370000000 HC RX 637 (ALT 250 FOR IP): Performed by: NURSE PRACTITIONER

## 2020-12-12 PROCEDURE — 80069 RENAL FUNCTION PANEL: CPT

## 2020-12-12 PROCEDURE — 6370000000 HC RX 637 (ALT 250 FOR IP): Performed by: INTERNAL MEDICINE

## 2020-12-12 PROCEDURE — 2580000003 HC RX 258: Performed by: NURSE PRACTITIONER

## 2020-12-12 PROCEDURE — 2580000003 HC RX 258: Performed by: INTERNAL MEDICINE

## 2020-12-12 PROCEDURE — 6360000002 HC RX W HCPCS: Performed by: INTERNAL MEDICINE

## 2020-12-12 PROCEDURE — 36415 COLL VENOUS BLD VENIPUNCTURE: CPT

## 2020-12-12 PROCEDURE — 83735 ASSAY OF MAGNESIUM: CPT

## 2020-12-12 PROCEDURE — 1200000000 HC SEMI PRIVATE

## 2020-12-12 RX ADMIN — POTASSIUM CHLORIDE 20 MEQ: 20 TABLET, EXTENDED RELEASE ORAL at 21:34

## 2020-12-12 RX ADMIN — SPIRONOLACTONE 25 MG: 25 TABLET ORAL at 08:58

## 2020-12-12 RX ADMIN — INSULIN LISPRO 2 UNITS: 100 INJECTION, SOLUTION INTRAVENOUS; SUBCUTANEOUS at 09:04

## 2020-12-12 RX ADMIN — FUROSEMIDE 10 MG/HR: 10 INJECTION, SOLUTION INTRAMUSCULAR; INTRAVENOUS at 00:37

## 2020-12-12 RX ADMIN — AMLODIPINE BESYLATE 10 MG: 5 TABLET ORAL at 08:58

## 2020-12-12 RX ADMIN — SERTRALINE HYDROCHLORIDE 125 MG: 50 TABLET ORAL at 21:35

## 2020-12-12 RX ADMIN — INSULIN LISPRO 1 UNITS: 100 INJECTION, SOLUTION INTRAVENOUS; SUBCUTANEOUS at 21:37

## 2020-12-12 RX ADMIN — TIZANIDINE 4 MG: 4 TABLET ORAL at 21:35

## 2020-12-12 RX ADMIN — SODIUM CHLORIDE, PRESERVATIVE FREE 10 ML: 5 INJECTION INTRAVENOUS at 08:58

## 2020-12-12 RX ADMIN — INSULIN LISPRO 4 UNITS: 100 INJECTION, SOLUTION INTRAVENOUS; SUBCUTANEOUS at 17:15

## 2020-12-12 RX ADMIN — APIXABAN 5 MG: 5 TABLET, FILM COATED ORAL at 21:35

## 2020-12-12 RX ADMIN — POTASSIUM CHLORIDE 20 MEQ: 20 TABLET, EXTENDED RELEASE ORAL at 08:58

## 2020-12-12 RX ADMIN — INSULIN LISPRO 2 UNITS: 100 INJECTION, SOLUTION INTRAVENOUS; SUBCUTANEOUS at 12:42

## 2020-12-12 RX ADMIN — INSULIN GLARGINE 15 UNITS: 100 INJECTION, SOLUTION SUBCUTANEOUS at 21:37

## 2020-12-12 RX ADMIN — TIZANIDINE 4 MG: 4 TABLET ORAL at 08:58

## 2020-12-12 RX ADMIN — FUROSEMIDE 10 MG/HR: 10 INJECTION, SOLUTION INTRAMUSCULAR; INTRAVENOUS at 21:41

## 2020-12-12 RX ADMIN — FLUTICASONE PROPIONATE 1 SPRAY: 50 SPRAY, METERED NASAL at 08:58

## 2020-12-12 RX ADMIN — PANTOPRAZOLE SODIUM 40 MG: 40 TABLET, DELAYED RELEASE ORAL at 08:58

## 2020-12-12 RX ADMIN — APIXABAN 5 MG: 5 TABLET, FILM COATED ORAL at 08:58

## 2020-12-12 RX ADMIN — LEVOTHYROXINE SODIUM 125 MCG: 0.12 TABLET ORAL at 06:32

## 2020-12-12 RX ADMIN — FUROSEMIDE 10 MG/HR: 10 INJECTION, SOLUTION INTRAMUSCULAR; INTRAVENOUS at 10:45

## 2020-12-12 RX ADMIN — ATORVASTATIN CALCIUM 20 MG: 10 TABLET, FILM COATED ORAL at 08:58

## 2020-12-12 NOTE — PROGRESS NOTES
Hospitalist Progress Note      PCP: Rosalia Martinez MD    Date of Admission: 12/4/2020       Chief Complaint: Bilateral leg edema and weight gain      Subjective:  continues to have good UOP, resting w/o issues,  no overnight events       Medications:  Reviewed    Infusion Medications    dextrose      furosemide (LASIX) 1mg/ml infusion 10 mg/hr (12/12/20 0037)     Scheduled Medications    potassium chloride  20 mEq Oral BID    spironolactone  25 mg Oral Daily    fluticasone  1 spray Each Nostril Daily    amLODIPine  10 mg Oral Daily    apixaban  5 mg Oral BID    atorvastatin  20 mg Oral Daily    insulin glargine  15 Units Subcutaneous Nightly    levothyroxine  125 mcg Oral Daily    pantoprazole  40 mg Oral Daily    sertraline  125 mg Oral Nightly    sodium chloride flush  10 mL Intravenous 2 times per day    insulin lispro  0-12 Units Subcutaneous TID WC    insulin lispro  0-6 Units Subcutaneous Nightly    tiZANidine  4 mg Oral BID     PRN Meds: sodium chloride flush, acetaminophen **OR** acetaminophen, polyethylene glycol, promethazine **OR** ondansetron, glucose, dextrose, glucagon (rDNA), dextrose, perflutren lipid microspheres, traMADol, carboxymethylcellulose PF, sodium chloride flush      Intake/Output Summary (Last 24 hours) at 12/12/2020 0939  Last data filed at 12/12/2020 0445  Gross per 24 hour   Intake 120 ml   Output 2375 ml   Net -2255 ml       Physical Exam Performed:    /69   Pulse 64   Temp 98.9 °F (37.2 °C) (Oral)   Resp 16   Ht 5' 5\" (1.651 m)   Wt (!) 345 lb 9.6 oz (156.8 kg)   SpO2 92%   BMI 57.51 kg/m²     General appearance: No apparent distress, appears stated age and cooperative. obese  HEENT: Pupils equal, round, and reactive to light. Conjunctivae/corneas clear. Neck: Supple, with full range of motion. No jugular venous distention. Trachea midline. Respiratory:  Normal respiratory effort. Clear to auscultation, bilaterally without Wheezes/Rhonchi.    Cardiovascular: Regular rate and rhythm with normal S1/S2 without murmurs, rubs or gallops. Abdomen: Soft, non-tender, non-distended with normal bowel sounds. Musculoskeletal: No clubbing, cyanosis.  significant Pitting anasarca.  Full range of motion without deformity. Skin: Skin color, texture, turgor normal.  No rashes or lesions. Neurologic:  Neurovascularly intact without any focal sensory/motor deficits. Cranial nerves: II-XII intact, grossly non-focal.  Psychiatric: Alert and oriented, thought content appropriate, normal insight  Capillary Refill: Brisk,< 3 seconds   Peripheral Pulses: +2 palpable, equal bilaterally     Labs:   Recent Labs     12/11/20  0510   WBC 3.3*   HGB 7.8*   HCT 25.3*        Recent Labs     12/10/20  0857 12/11/20  0510    139   K 3.1* 3.6    103   CO2 28 29   BUN 40* 41*   CREATININE 1.2 1.4*   CALCIUM 9.6 9.6   PHOS 3.0 3.1     No results for input(s): AST, ALT, BILIDIR, BILITOT, ALKPHOS in the last 72 hours. No results for input(s): INR in the last 72 hours. No results for input(s): Terrance Jacob in the last 72 hours. Urinalysis:      Lab Results   Component Value Date    NITRU Negative 12/05/2020    WBCUA 3-5 12/05/2020    BACTERIA 2+ 08/20/2020    RBCUA 5-10 12/05/2020    BLOODU SMALL 12/05/2020    SPECGRAV 1.025 12/05/2020    GLUCOSEU Negative 12/05/2020       Radiology:  CT ABDOMEN PELVIS WO CONTRAST Additional Contrast? None   Final Result   1. Findings of diffuse volume overload including abdominal and pelvic   ascites, anasarca and findings of congestive heart failure in the lower lungs. 2. Hepatic morphologic features of cirrhosis. No gross hepatic lesion   evident. 3. Splenomegaly, typically associated with portal venous hypertension but   nonspecific. 4. Mild right upper quadrant lymph node enlargement is very likely reactive. XR CHEST PORTABLE   Final Result   Vascular congestion. No acute pulmonary disease. Stable enlargement of cardiac silhouette. Assessment/Plan:    Active Hospital Problems    Diagnosis    Acute on chronic diastolic CHF (congestive heart failure) ([facility]) [I50.33]    Morbid obesity ([facility] [E66.01]    Fluid overload [E87.70]    CHF (congestive heart failure) ([facility]) [I50.9]    CAD (coronary artery disease) [I25.10]    Acute kidney injury superimposed on chronic kidney disease ([facility] [N17.9, N18.9]    Essential hypertension [I10]    Type 2 diabetes mellitus, with long-term current use of insulin ([facility]) [E11.9, Z79.4]    Stage 3 chronic kidney disease [N18.30]            \"70 y.o. female, with past medical history of CHF, morbid obesity, diabetes, hypertension, CAD, hyperlipidemia and atrial fibrillation, who presented to [facility] with bilateral leg edema and weight gain. The patient is a resident of [address] [place] [address]. Catalino Alfonso stated over the last 4 months she has gained roughly 74 pounds. Catalino Alfonso stated most of her weight is in her abdomen and bilateral legs.  Patient stated she has a history of Flonnie Royals as well as CKD stage III.  She stated she sees  from nephrology at [facility] in [facility].  Per EMR, in 2018 the patient had 8.5 L removed through paracentesis while admitted at [address] [place] [address] [place] the emergency room patient had a chest x-ray that revealed vascular congestion.  She also had a CT abdomen pelvis that revealed findings of diffuse volume overload including abdominal and pelvic ascites, anasarca and findings consistent of congestive heart failure in the lower lungs. \"         Acute on chronic diastolic CHF  - furosemide gtt  and  spironolactone.  She will need to be more compliant with her outpatient diuretic regimen - she admits to refusing meds because she couldn't make it to the bathroom in time.  Staff there will need to focus on helping her to the bathroom quickly when she asks.   - at the time of last discharge on 8/23/20, she weighed 242 lbs on a standing scale.    - Echo(ef 55%, no wm abn, rv enlarged, mod tr, severe pulm htn)  - no ARB/ARNI due to DIONNE, consider as outpatient  - she is on amlodipine.    - need to add metoprolol when she is closer to euvolemic     DIONNE on CKD3  - due to cardiorenal syndrome.  Baseline Cr perhaps about 1.3.  Monitored  - consider nephro consult if Cr bumps again     pAfib- stable  -on apixaban(eliquis)     DM2  - insulin regimen     Chronic normocytic anemia  - 1u pRBCs on 12/5     HLD  - statin     Morbid obesity  With Body mass index is 52.4 kg/m². Complicating assessment and treatment. Placing patient at risk for multiple co-morbidities as well as early death and contributing to the patient's presentation.  Counseled on weight loss         DVT Prophylaxis: on eliquis  Diet: DIET LOW SODIUM 2 GM; 2000 ml  Code Status: Full Code    PT/OT Eval Status: ordered 12/9 and rec ecf with pt/ot     Dispo - when adequately diuresed, which still seems a long way off.  She lives at [facility], [place]

## 2020-12-12 NOTE — PLAN OF CARE
Problem: OXYGENATION/RESPIRATORY FUNCTION  Goal: Patient will maintain patent airway  Outcome: Ongoing     Problem: HEMODYNAMIC STATUS  Goal: Patient has stable vital signs and fluid balance  Outcome: Ongoing     Problem: HEMODYNAMIC STATUS  Goal: Patient has stable vital signs and fluid balance  Outcome: Ongoing     Problem: FLUID AND ELECTROLYTE IMBALANCE  Goal: Fluid and electrolyte balance are achieved/maintained  Outcome: Ongoing     Patient's EF (Ejection Fraction) is greater than 40%    Heart Failure Medications:  Diuretics[de-identified] Furosemide and Spironolactone    (One of the following REQUIRED for EF <40%/SYSTOLIC FAILURE but MAY be used in EF% >40%/DIASTOLIC FAILURE)        ACE[de-identified] None        ARB[de-identified] None         ARNI[de-identified] None    (Beta Blockers)  NON- Evidenced Based Beta Blocker (for EF% >40%/DIASTOLIC FAILURE): None    Evidenced Based Beta Blocker::(REQUIRED for EF% <40%/SYSTOLIC FAILURE) None  . .................................................................................................................................................. Patient's weights and intake/output reviewed: Yes    Patient's Last Weight: 345 lbs obtained by bed scale. Difference of 5 lbs more than last documented weight. Intake/Output Summary (Last 24 hours) at 12/12/2020 1027  Last data filed at 12/12/2020 1021  Gross per 24 hour   Intake 450 ml   Output 2850 ml   Net -2400 ml       Comorbidities Reviewed Yes    Patient has a past medical history of Anxiety, Arthritis, Atrial fibrillation ([address] CAD (coronary artery disease), CHF (congestive heart failure) ([address] Chronic pain syndrome, Cirrhosis ([address] Depression, Diabetes mellitus ([address] GERD (gastroesophageal reflux disease), Hyperlipidemia, Hypertension, Kidney disease, Thyroid disease, and Vitamin D deficiency.      >>For CHF and Comorbidity documentation on Education Time and Topics, please see Education Tab    Progressive Mobility Assessment:  What is this patient's Current Pt reports feeling well enough to return home. D/C as per MD. Level of Mobility?: Ambulatory- with Assistance  How was this patient Mobilized today?: Edge of Bed                 With Whom? Nurse and PCA                 Level of Difficulty/Assistance: 1x Assist     Pt resting in bed at this time on room air. Pt denies shortness of breath. Pt with pitting lower extremity edema.      Patient and/or Family's stated Goal of Care this Admission: reduce shortness of breath, increase activity tolerance, better understand heart failure and disease management, be more comfortable, and reduce lower extremity edema prior to discharge        :

## 2020-12-13 LAB
ALBUMIN SERPL-MCNC: 3.4 G/DL (ref 3.4–5)
ANION GAP SERPL CALCULATED.3IONS-SCNC: 7 MMOL/L (ref 3–16)
BASOPHILS ABSOLUTE: 0 K/UL (ref 0–0.2)
BASOPHILS RELATIVE PERCENT: 0.3 %
BUN BLDV-MCNC: 38 MG/DL (ref 7–20)
CALCIUM SERPL-MCNC: 9.5 MG/DL (ref 8.3–10.6)
CHLORIDE BLD-SCNC: 100 MMOL/L (ref 99–110)
CO2: 28 MMOL/L (ref 21–32)
CREAT SERPL-MCNC: 1.3 MG/DL (ref 0.6–1.2)
EOSINOPHILS ABSOLUTE: 0.1 K/UL (ref 0–0.6)
EOSINOPHILS RELATIVE PERCENT: 3.4 %
GFR AFRICAN AMERICAN: 49
GFR NON-AFRICAN AMERICAN: 41
GLUCOSE BLD-MCNC: 133 MG/DL (ref 70–99)
GLUCOSE BLD-MCNC: 186 MG/DL (ref 70–99)
GLUCOSE BLD-MCNC: 192 MG/DL (ref 70–99)
GLUCOSE BLD-MCNC: 215 MG/DL (ref 70–99)
GLUCOSE BLD-MCNC: 221 MG/DL (ref 70–99)
HCT VFR BLD CALC: 23.9 % (ref 36–48)
HEMOGLOBIN: 7.5 G/DL (ref 12–16)
LYMPHOCYTES ABSOLUTE: 0.4 K/UL (ref 1–5.1)
LYMPHOCYTES RELATIVE PERCENT: 11.7 %
MAGNESIUM: 1.7 MG/DL (ref 1.8–2.4)
MCH RBC QN AUTO: 25.9 PG (ref 26–34)
MCHC RBC AUTO-ENTMCNC: 31.6 G/DL (ref 31–36)
MCV RBC AUTO: 82.1 FL (ref 80–100)
MONOCYTES ABSOLUTE: 0.2 K/UL (ref 0–1.3)
MONOCYTES RELATIVE PERCENT: 7.1 %
NEUTROPHILS ABSOLUTE: 2.3 K/UL (ref 1.7–7.7)
NEUTROPHILS RELATIVE PERCENT: 77.5 %
PDW BLD-RTO: 16.5 % (ref 12.4–15.4)
PERFORMED ON: ABNORMAL
PHOSPHORUS: 2.8 MG/DL (ref 2.5–4.9)
PLATELET # BLD: 153 K/UL (ref 135–450)
PMV BLD AUTO: 7.5 FL (ref 5–10.5)
POTASSIUM SERPL-SCNC: 3.7 MMOL/L (ref 3.5–5.1)
RBC # BLD: 2.91 M/UL (ref 4–5.2)
SODIUM BLD-SCNC: 135 MMOL/L (ref 136–145)
WBC # BLD: 3 K/UL (ref 4–11)

## 2020-12-13 PROCEDURE — 85025 COMPLETE CBC W/AUTO DIFF WBC: CPT

## 2020-12-13 PROCEDURE — 6360000002 HC RX W HCPCS: Performed by: INTERNAL MEDICINE

## 2020-12-13 PROCEDURE — 1200000000 HC SEMI PRIVATE

## 2020-12-13 PROCEDURE — 36415 COLL VENOUS BLD VENIPUNCTURE: CPT

## 2020-12-13 PROCEDURE — 6370000000 HC RX 637 (ALT 250 FOR IP): Performed by: NURSE PRACTITIONER

## 2020-12-13 PROCEDURE — 6370000000 HC RX 637 (ALT 250 FOR IP): Performed by: INTERNAL MEDICINE

## 2020-12-13 PROCEDURE — 2580000003 HC RX 258: Performed by: NURSE PRACTITIONER

## 2020-12-13 PROCEDURE — 2580000003 HC RX 258: Performed by: INTERNAL MEDICINE

## 2020-12-13 PROCEDURE — 83735 ASSAY OF MAGNESIUM: CPT

## 2020-12-13 PROCEDURE — 80069 RENAL FUNCTION PANEL: CPT

## 2020-12-13 RX ORDER — TETRAHYDROZOLINE HCL 0.05 %
1 DROPS OPHTHALMIC (EYE) EVERY 4 HOURS PRN
Status: DISCONTINUED | OUTPATIENT
Start: 2020-12-13 | End: 2020-12-24 | Stop reason: HOSPADM

## 2020-12-13 RX ADMIN — ATORVASTATIN CALCIUM 20 MG: 10 TABLET, FILM COATED ORAL at 08:31

## 2020-12-13 RX ADMIN — INSULIN LISPRO 4 UNITS: 100 INJECTION, SOLUTION INTRAVENOUS; SUBCUTANEOUS at 13:00

## 2020-12-13 RX ADMIN — PANTOPRAZOLE SODIUM 40 MG: 40 TABLET, DELAYED RELEASE ORAL at 08:31

## 2020-12-13 RX ADMIN — SODIUM CHLORIDE, PRESERVATIVE FREE 10 ML: 5 INJECTION INTRAVENOUS at 21:57

## 2020-12-13 RX ADMIN — POTASSIUM CHLORIDE 20 MEQ: 20 TABLET, EXTENDED RELEASE ORAL at 08:31

## 2020-12-13 RX ADMIN — APIXABAN 5 MG: 5 TABLET, FILM COATED ORAL at 08:31

## 2020-12-13 RX ADMIN — FLUTICASONE PROPIONATE 1 SPRAY: 50 SPRAY, METERED NASAL at 08:31

## 2020-12-13 RX ADMIN — TIZANIDINE 4 MG: 4 TABLET ORAL at 08:31

## 2020-12-13 RX ADMIN — INSULIN GLARGINE 15 UNITS: 100 INJECTION, SOLUTION SUBCUTANEOUS at 21:58

## 2020-12-13 RX ADMIN — AMLODIPINE BESYLATE 10 MG: 5 TABLET ORAL at 08:31

## 2020-12-13 RX ADMIN — SODIUM CHLORIDE, PRESERVATIVE FREE 10 ML: 5 INJECTION INTRAVENOUS at 08:31

## 2020-12-13 RX ADMIN — FUROSEMIDE 10 MG/HR: 10 INJECTION, SOLUTION INTRAMUSCULAR; INTRAVENOUS at 08:26

## 2020-12-13 RX ADMIN — FUROSEMIDE 10 MG/HR: 10 INJECTION, SOLUTION INTRAMUSCULAR; INTRAVENOUS at 17:10

## 2020-12-13 RX ADMIN — SPIRONOLACTONE 25 MG: 25 TABLET ORAL at 08:31

## 2020-12-13 RX ADMIN — LEVOTHYROXINE SODIUM 125 MCG: 0.12 TABLET ORAL at 05:46

## 2020-12-13 RX ADMIN — INSULIN LISPRO 2 UNITS: 100 INJECTION, SOLUTION INTRAVENOUS; SUBCUTANEOUS at 16:51

## 2020-12-13 RX ADMIN — TIZANIDINE 4 MG: 4 TABLET ORAL at 21:57

## 2020-12-13 RX ADMIN — POTASSIUM CHLORIDE 20 MEQ: 20 TABLET, EXTENDED RELEASE ORAL at 21:57

## 2020-12-13 RX ADMIN — SERTRALINE HYDROCHLORIDE 125 MG: 50 TABLET ORAL at 21:56

## 2020-12-13 RX ADMIN — APIXABAN 5 MG: 5 TABLET, FILM COATED ORAL at 21:57

## 2020-12-13 RX ADMIN — INSULIN LISPRO 2 UNITS: 100 INJECTION, SOLUTION INTRAVENOUS; SUBCUTANEOUS at 21:57

## 2020-12-13 ASSESSMENT — PAIN SCALES - GENERAL: PAINLEVEL_OUTOF10: 0

## 2020-12-13 NOTE — PROGRESS NOTES
Physical Therapy    Physical therapy attempted to treat this patient. However the patient refused to participate. \"Oh no! Please go away! \" PT educated patient on the benefits of exercise and the consequences of prolonged bedrest. Patient continued to adamantly refuse to participate. PT will re-attempt to treat this patient as able. Thank you.      Faina Bowles PT

## 2020-12-13 NOTE — PROGRESS NOTES
Hospitalist Progress Note      PCP: José Miguel Oliver MD    Date of Admission: 12/4/2020       Chief Complaint: Bilateral leg edema and weight gain      Subjective:  continues to have good UOP, resting w/o issues,  no overnight events       Medications:  Reviewed    Infusion Medications    dextrose      furosemide (LASIX) 1mg/ml infusion 10 mg/hr (12/13/20 0826)     Scheduled Medications    potassium chloride  20 mEq Oral BID    spironolactone  25 mg Oral Daily    fluticasone  1 spray Each Nostril Daily    amLODIPine  10 mg Oral Daily    apixaban  5 mg Oral BID    atorvastatin  20 mg Oral Daily    insulin glargine  15 Units Subcutaneous Nightly    levothyroxine  125 mcg Oral Daily    pantoprazole  40 mg Oral Daily    sertraline  125 mg Oral Nightly    sodium chloride flush  10 mL Intravenous 2 times per day    insulin lispro  0-12 Units Subcutaneous TID WC    insulin lispro  0-6 Units Subcutaneous Nightly    tiZANidine  4 mg Oral BID     PRN Meds: naphazoline-pheniramine, sodium chloride flush, acetaminophen **OR** acetaminophen, polyethylene glycol, promethazine **OR** ondansetron, glucose, dextrose, glucagon (rDNA), dextrose, perflutren lipid microspheres, traMADol, carboxymethylcellulose PF, sodium chloride flush      Intake/Output Summary (Last 24 hours) at 12/13/2020 0933  Last data filed at 12/13/2020 0550  Gross per 24 hour   Intake 1060 ml   Output 2825 ml   Net -1765 ml       Physical Exam Performed:    BP (!) 127/49   Pulse 67   Temp 98.7 °F (37.1 °C) (Oral)   Resp 18   Ht 5' 5\" (1.651 m)   Wt (!) 343 lb 8 oz (155.8 kg)   SpO2 92%   BMI 57.16 kg/m²     General appearance: No apparent distress, appears stated age and cooperative. obese  HEENT: Pupils equal, round, and reactive to light. Conjunctivae/corneas clear. Neck: Supple, with full range of motion. No jugular venous distention. Trachea midline. Respiratory:  Normal respiratory effort.  Clear to auscultation, bilaterally without Wheezes/Rhonchi.    Cardiovascular: Regular rate and rhythm with normal S1/S2 without murmurs, rubs or gallops. Abdomen: Soft, non-tender, non-distended with normal bowel sounds. Musculoskeletal: No clubbing, cyanosis.  significant Pitting anasarca.  Full range of motion without deformity. Skin: Skin color, texture, turgor normal.  No rashes or lesions. Neurologic:  Neurovascularly intact without any focal sensory/motor deficits. Cranial nerves: II-XII intact, grossly non-focal.  Psychiatric: Alert and oriented, thought content appropriate, normal insight  Capillary Refill: Brisk,< 3 seconds   Peripheral Pulses: +2 palpable, equal bilaterally     Labs:   Recent Labs     12/11/20  0510   WBC 3.3*   HGB 7.8*   HCT 25.3*        Recent Labs     12/11/20  0510 12/12/20  0920    138   K 3.6 3.7    101   CO2 29 28   BUN 41* 37*   CREATININE 1.4* 1.2   CALCIUM 9.6 9.7   PHOS 3.1 2.8     No results for input(s): AST, ALT, BILIDIR, BILITOT, ALKPHOS in the last 72 hours. No results for input(s): INR in the last 72 hours. No results for input(s): Felix Seed in the last 72 hours. Urinalysis:      Lab Results   Component Value Date    NITRU Negative 12/05/2020    WBCUA 3-5 12/05/2020    BACTERIA 2+ 08/20/2020    RBCUA 5-10 12/05/2020    BLOODU SMALL 12/05/2020    SPECGRAV 1.025 12/05/2020    GLUCOSEU Negative 12/05/2020       Radiology:  CT ABDOMEN PELVIS WO CONTRAST Additional Contrast? None   Final Result   1. Findings of diffuse volume overload including abdominal and pelvic   ascites, anasarca and findings of congestive heart failure in the lower lungs. 2. Hepatic morphologic features of cirrhosis. No gross hepatic lesion   evident. 3. Splenomegaly, typically associated with portal venous hypertension but   nonspecific. 4. Mild right upper quadrant lymph node enlargement is very likely reactive. XR CHEST PORTABLE   Final Result   Vascular congestion.       No acute pulmonary disease. Stable enlargement of cardiac silhouette. Assessment/Plan:    Active Hospital Problems    Diagnosis    Acute on chronic diastolic CHF (congestive heart failure) ([facility]) [I50.33]    Morbid obesity ([facility] [E66.01]    Fluid overload [E87.70]    CHF (congestive heart failure) ([facility]) [I50.9]    CAD (coronary artery disease) [I25.10]    Acute kidney injury superimposed on chronic kidney disease ([facility] [N17.9, N18.9]    Essential hypertension [I10]    Type 2 diabetes mellitus, with long-term current use of insulin ([facility]) [E11.9, Z79.4]    Stage 3 chronic kidney disease [N18.30]            \"70 y.o. female, with past medical history of CHF, morbid obesity, diabetes, hypertension, CAD, hyperlipidemia and atrial fibrillation, who presented to [facility] with bilateral leg edema and weight gain. The patient is a resident of [address] [place] [address]. [facility] stated over the last 4 months she has gained roughly 74 pounds. [facility] stated most of her weight is in her abdomen and bilateral legs.  Patient stated she has a history of Illa Neigh as well as CKD stage III.  She stated she sees  from nephrology at [facility] in [place].  Per EMR, in 2018 the patient had 8.5 L removed through paracentesis while admitted at [address] [place] [address] Ne the emergency room patient had a chest x-ray that revealed vascular congestion.  She also had a CT abdomen pelvis that revealed findings of diffuse volume overload including abdominal and pelvic ascites, anasarca and findings consistent of congestive heart failure in the lower lungs. \"         Acute on chronic diastolic CHF  - furosemide gtt  and  spironolactone.  She will need to be more compliant with her outpatient diuretic regimen - she admits to refusing meds because she couldn't make it to the bathroom in time.  Staff there will need to focus on helping her to the bathroom quickly when she asks.   - at the time of last discharge on 8/23/20, she weighed 242 lbs on a standing scale.    - Echo(ef 55%, no wm abn, rv enlarged, mod tr, severe pulm htn)  - no ARB/ARNI due to DIONNE, consider as outpatient  - she is on amlodipine.    - need to add metoprolol when she is closer to euvolemic     DIONNE on CKD3  - due to cardiorenal syndrome.  Baseline Cr perhaps about 1.3.  Monitored  - consider nephro consult if Cr bumps again. Now resolved.      pAfib- stable  -on apixaban(eliquis)     DM2  - insulin regimen     Chronic normocytic anemia  - 1u pRBCs on 12/5     HLD  - statin     Morbid obesity  With Body mass index is 52.4 kg/m². Complicating assessment and treatment. Placing patient at risk for multiple co-morbidities as well as early death and contributing to the patient's presentation.  Counseled on weight loss         DVT Prophylaxis: on eliquis  Diet: DIET LOW SODIUM 2 GM; 2000 ml  Code Status: Full Code    PT/OT Eval Status: ordered 12/9 and rec ecf with pt/ot     Dispo - when adequately diuresed, which still seems a long way off.  She lives at [facility], [place]

## 2020-12-13 NOTE — PLAN OF CARE
Problem: OXYGENATION/RESPIRATORY FUNCTION  Goal: Patient will maintain patent airway  Outcome: Ongoing     Problem: HEMODYNAMIC STATUS  Goal: Patient has stable vital signs and fluid balance  Outcome: Ongoing     Problem: FLUID AND ELECTROLYTE IMBALANCE  Goal: Fluid and electrolyte balance are achieved/maintained  Outcome: Ongoing     Patient's EF (Ejection Fraction) is greater than 40%    Heart Failure Medications:  Diuretics[de-identified] Furosemide and Spironolactone    (One of the following REQUIRED for EF <40%/SYSTOLIC FAILURE but MAY be used in EF% >40%/DIASTOLIC FAILURE)        ACE[de-identified] None        ARB[de-identified] None         ARNI[de-identified] None    (Beta Blockers)  NON- Evidenced Based Beta Blocker (for EF% >40%/DIASTOLIC FAILURE): None    Evidenced Based Beta Blocker::(REQUIRED for EF% <40%/SYSTOLIC FAILURE) None  . .................................................................................................................................................. Patient's weights and intake/output reviewed: Yes    Patient's Last Weight: 343 lbs obtained by bed scale. Difference of 2 lbs less than last documented weight. Intake/Output Summary (Last 24 hours) at 12/13/2020 1332  Last data filed at 12/13/2020 1323  Gross per 24 hour   Intake 1590 ml   Output 3200 ml   Net -1610 ml       Comorbidities Reviewed Yes    Patient has a past medical history of Anxiety, Arthritis, Atrial fibrillation ([address] CAD (coronary artery disease), CHF (congestive heart failure) ([address] Chronic pain syndrome, Cirrhosis ([address] Depression, Diabetes mellitus ([address] GERD (gastroesophageal reflux disease), Hyperlipidemia, Hypertension, Kidney disease, Thyroid disease, and Vitamin D deficiency.      >>For CHF and Comorbidity documentation on Education Time and Topics, please see Education Tab    Progressive Mobility Assessment:  What is this patient's Current Level of Mobility?: Requires Bed Rest  How was this patient Mobilized today?: Edge of Bed With Whom? RN, PCA                 Level of Difficulty/Assistance: 1x Assist     Pt resting in bed at this time on room air. Pt denies shortness of breath. Pt with pitting lower extremity edema.      Patient and/or Family's stated Goal of Care this Admission: reduce shortness of breath, increase activity tolerance, better understand heart failure and disease management, be more comfortable, and reduce lower extremity edema prior to discharge        :

## 2020-12-14 LAB
ALBUMIN SERPL-MCNC: 3.6 G/DL (ref 3.4–5)
ANION GAP SERPL CALCULATED.3IONS-SCNC: 10 MMOL/L (ref 3–16)
BUN BLDV-MCNC: 36 MG/DL (ref 7–20)
CALCIUM SERPL-MCNC: 10 MG/DL (ref 8.3–10.6)
CHLORIDE BLD-SCNC: 100 MMOL/L (ref 99–110)
CO2: 28 MMOL/L (ref 21–32)
CREAT SERPL-MCNC: 1.3 MG/DL (ref 0.6–1.2)
GFR AFRICAN AMERICAN: 49
GFR NON-AFRICAN AMERICAN: 41
GLUCOSE BLD-MCNC: 141 MG/DL (ref 70–99)
GLUCOSE BLD-MCNC: 171 MG/DL (ref 70–99)
GLUCOSE BLD-MCNC: 185 MG/DL (ref 70–99)
GLUCOSE BLD-MCNC: 189 MG/DL (ref 70–99)
GLUCOSE BLD-MCNC: 198 MG/DL (ref 70–99)
MAGNESIUM: 1.4 MG/DL (ref 1.8–2.4)
PERFORMED ON: ABNORMAL
PHOSPHORUS: 2.9 MG/DL (ref 2.5–4.9)
POTASSIUM SERPL-SCNC: 3.9 MMOL/L (ref 3.5–5.1)
SODIUM BLD-SCNC: 138 MMOL/L (ref 136–145)

## 2020-12-14 PROCEDURE — 2580000003 HC RX 258: Performed by: INTERNAL MEDICINE

## 2020-12-14 PROCEDURE — 6370000000 HC RX 637 (ALT 250 FOR IP): Performed by: INTERNAL MEDICINE

## 2020-12-14 PROCEDURE — 97535 SELF CARE MNGMENT TRAINING: CPT

## 2020-12-14 PROCEDURE — 36415 COLL VENOUS BLD VENIPUNCTURE: CPT

## 2020-12-14 PROCEDURE — 6370000000 HC RX 637 (ALT 250 FOR IP): Performed by: NURSE PRACTITIONER

## 2020-12-14 PROCEDURE — 6360000002 HC RX W HCPCS: Performed by: INTERNAL MEDICINE

## 2020-12-14 PROCEDURE — 99222 1ST HOSP IP/OBS MODERATE 55: CPT | Performed by: INTERNAL MEDICINE

## 2020-12-14 PROCEDURE — 2580000003 HC RX 258: Performed by: NURSE PRACTITIONER

## 2020-12-14 PROCEDURE — 97110 THERAPEUTIC EXERCISES: CPT

## 2020-12-14 PROCEDURE — 1200000000 HC SEMI PRIVATE

## 2020-12-14 PROCEDURE — 80069 RENAL FUNCTION PANEL: CPT

## 2020-12-14 PROCEDURE — 83735 ASSAY OF MAGNESIUM: CPT

## 2020-12-14 RX ORDER — AMLODIPINE BESYLATE 2.5 MG/1
2.5 TABLET ORAL DAILY
Status: DISCONTINUED | OUTPATIENT
Start: 2020-12-15 | End: 2020-12-15

## 2020-12-14 RX ORDER — METOLAZONE 2.5 MG/1
10 TABLET ORAL DAILY
Status: DISCONTINUED | OUTPATIENT
Start: 2020-12-14 | End: 2020-12-15

## 2020-12-14 RX ORDER — SPIRONOLACTONE 25 MG/1
25 TABLET ORAL 2 TIMES DAILY
Status: DISCONTINUED | OUTPATIENT
Start: 2020-12-14 | End: 2020-12-15

## 2020-12-14 RX ORDER — MAGNESIUM SULFATE 1 G/100ML
1 INJECTION INTRAVENOUS PRN
Status: DISCONTINUED | OUTPATIENT
Start: 2020-12-14 | End: 2020-12-24 | Stop reason: HOSPADM

## 2020-12-14 RX ADMIN — METOLAZONE 10 MG: 2.5 TABLET ORAL at 14:53

## 2020-12-14 RX ADMIN — CARBOXYMETHYLCELLULOSE SODIUM 1 DROP: 10 GEL OPHTHALMIC at 22:09

## 2020-12-14 RX ADMIN — PANTOPRAZOLE SODIUM 40 MG: 40 TABLET, DELAYED RELEASE ORAL at 10:04

## 2020-12-14 RX ADMIN — SPIRONOLACTONE 25 MG: 25 TABLET ORAL at 10:04

## 2020-12-14 RX ADMIN — FUROSEMIDE 10 MG/HR: 10 INJECTION, SOLUTION INTRAMUSCULAR; INTRAVENOUS at 04:36

## 2020-12-14 RX ADMIN — MAGNESIUM SULFATE HEPTAHYDRATE 1 G: 1 INJECTION, SOLUTION INTRAVENOUS at 14:53

## 2020-12-14 RX ADMIN — ATORVASTATIN CALCIUM 20 MG: 10 TABLET, FILM COATED ORAL at 10:04

## 2020-12-14 RX ADMIN — FLUTICASONE PROPIONATE 1 SPRAY: 50 SPRAY, METERED NASAL at 14:00

## 2020-12-14 RX ADMIN — TETRAHYDROZOLINE HCL 1 DROP: 0.05 SOLUTION/ DROPS OPHTHALMIC at 14:01

## 2020-12-14 RX ADMIN — FUROSEMIDE 10 MG/HR: 10 INJECTION, SOLUTION INTRAMUSCULAR; INTRAVENOUS at 17:54

## 2020-12-14 RX ADMIN — SODIUM CHLORIDE, PRESERVATIVE FREE 10 ML: 5 INJECTION INTRAVENOUS at 10:04

## 2020-12-14 RX ADMIN — LEVOTHYROXINE SODIUM 125 MCG: 0.12 TABLET ORAL at 06:00

## 2020-12-14 RX ADMIN — INSULIN LISPRO 2 UNITS: 100 INJECTION, SOLUTION INTRAVENOUS; SUBCUTANEOUS at 18:01

## 2020-12-14 RX ADMIN — INSULIN LISPRO 2 UNITS: 100 INJECTION, SOLUTION INTRAVENOUS; SUBCUTANEOUS at 10:05

## 2020-12-14 RX ADMIN — TETRAHYDROZOLINE HCL 1 DROP: 0.05 SOLUTION/ DROPS OPHTHALMIC at 07:37

## 2020-12-14 RX ADMIN — APIXABAN 5 MG: 5 TABLET, FILM COATED ORAL at 10:03

## 2020-12-14 RX ADMIN — POTASSIUM CHLORIDE 20 MEQ: 20 TABLET, EXTENDED RELEASE ORAL at 22:10

## 2020-12-14 RX ADMIN — INSULIN LISPRO 2 UNITS: 100 INJECTION, SOLUTION INTRAVENOUS; SUBCUTANEOUS at 14:00

## 2020-12-14 RX ADMIN — INSULIN LISPRO 1 UNITS: 100 INJECTION, SOLUTION INTRAVENOUS; SUBCUTANEOUS at 22:25

## 2020-12-14 RX ADMIN — APIXABAN 5 MG: 5 TABLET, FILM COATED ORAL at 22:09

## 2020-12-14 RX ADMIN — IRON SUCROSE 200 MG: 20 INJECTION, SOLUTION INTRAVENOUS at 17:54

## 2020-12-14 RX ADMIN — SPIRONOLACTONE 25 MG: 25 TABLET ORAL at 17:54

## 2020-12-14 RX ADMIN — SERTRALINE HYDROCHLORIDE 125 MG: 50 TABLET ORAL at 22:09

## 2020-12-14 RX ADMIN — TIZANIDINE 4 MG: 4 TABLET ORAL at 22:10

## 2020-12-14 RX ADMIN — AMLODIPINE BESYLATE 10 MG: 5 TABLET ORAL at 10:03

## 2020-12-14 RX ADMIN — INSULIN GLARGINE 15 UNITS: 100 INJECTION, SOLUTION SUBCUTANEOUS at 22:24

## 2020-12-14 RX ADMIN — TIZANIDINE 4 MG: 4 TABLET ORAL at 10:04

## 2020-12-14 RX ADMIN — POTASSIUM CHLORIDE 20 MEQ: 20 TABLET, EXTENDED RELEASE ORAL at 10:04

## 2020-12-14 RX ADMIN — MAGNESIUM SULFATE HEPTAHYDRATE 1 G: 1 INJECTION, SOLUTION INTRAVENOUS at 16:28

## 2020-12-14 ASSESSMENT — PAIN SCALES - GENERAL
PAINLEVEL_OUTOF10: 0
PAINLEVEL_OUTOF10: 0

## 2020-12-14 NOTE — PROGRESS NOTES
Sent secure message via perfect serve to Dr. Vicenta Gaming pt magnesium level 1.40. pt on Lasix drip. Do you want to order magnesium replacement?   thank you

## 2020-12-14 NOTE — CONSULTS
Patient Name: Germain Yip                                                    Primary Physician: Marisa Martinez MD  Admitting Dx: Fluid overload [E87.70]  Fluid overload [E87.70]                               [facility] NEPHROLOGY                   Nephrology Inpatient  Consult    HPI:  This is a consult for Germain Yip  requested by Marisa Martinez MD for the reason of Acute on chronic kidney disease and volume overload. The patient is a 71-year-old  female with a past medical history of hypertension, hyperlipidemia, diabetes mellitus, morbid obesity, history of congestive heart failure, diastolic heart failure, coronary artery disease, atrial fibrillation, bilateral lower extremity edema previous history of cellulitis, and has had multiple admissions for weight gain and volume overload. She also has Alisia Santo cirrhosis/steatohepatitis. At the nursing home she is on multiple diuretics. She was last admitted on 12/5/2020 with volume overload and weight gain. She tells me that her good weight is around 220 pounds. Current weight is 335 pounds she does admit to skipping diuretic dosage because of inability to get to the bathroom on time. She does have a history of CKD stage III under the care of Dr. Maeve Woods, from [facility] care in [facility]. She also gets intermittent paracentesis for resistant ascites. Her baseline creatinine is around 1.5 but has gone down as low as 0.8 and has been as high as 2.6. Does not have any history of kidney stones. No history of high postvoid residuals. Does get recurrent urinary tract infection but currently have no symptoms. At the nursing home she is supposed to be on Lasix 20 mg twice a day. She does have a history of medication noncompliance. Also has not aware of fluid restriction. She denies any chest pain headache blurry vision fever chills rigors cough diarrhea nausea vomiting or constipation. No rash on the body.   Appetite has been fair otherwise. I am not sure her review of system is very accurate because of her being a poor historian. Rest of the review of system is negative    Past Medical History:   Diagnosis Date    Anxiety     Arthritis     Atrial fibrillation ([facility]     CAD (coronary artery disease)     CHF (congestive heart failure) (HCC)     Chronic pain syndrome     Cirrhosis ([facility]     Depression     Diabetes mellitus (HCC)     GERD (gastroesophageal reflux disease)     Hyperlipidemia     Hypertension     Kidney disease     Thyroid disease     Vitamin D deficiency        Past Surgical History:   Procedure Laterality Date    BREAST REDUCTION SURGERY      CHOLECYSTECTOMY      CORONARY ARTERY BYPASS GRAFT MAZE PROCEDURE  1995    triple bypass       Social History     Socioeconomic History    Marital status:       Spouse name: Not on file    Number of children: Not on file    Years of education: Not on file    Highest education level: Not on file   Occupational History    Not on file   Social Needs    Financial resource strain: Not on file    Food insecurity     Worry: Not on file     Inability: Not on file    Transportation needs     Medical: Not on file     Non-medical: Not on file   Tobacco Use    Smoking status: Never Smoker    Smokeless tobacco: Never Used   Substance and Sexual Activity    Alcohol use: Never     Frequency: Never    Drug use: Never    Sexual activity: Not on file   Lifestyle    Physical activity     Days per week: Not on file     Minutes per session: Not on file    Stress: Not on file   Relationships    Social connections     Talks on phone: Not on file     Gets together: Not on file     Attends Shinto service: Not on file     Active member of club or organization: Not on file     Attends meetings of clubs or organizations: Not on file     Relationship status: Not on file    Intimate partner violence     Fear of current or ex partner: Not on file     Emotionally abused: Not on file     Physically abused: Not on file     Forced sexual activity: Not on file   Other Topics Concern    Not on file   Social History Narrative    Not on file       History reviewed. No pertinent family history.           potassium chloride, 20 mEq, BID      spironolactone, 25 mg, Daily      fluticasone, 1 spray, Daily      amLODIPine, 10 mg, Daily      apixaban, 5 mg, BID      atorvastatin, 20 mg, Daily      insulin glargine, 15 Units, Nightly      levothyroxine, 125 mcg, Daily      pantoprazole, 40 mg, Daily      sertraline, 125 mg, Nightly      sodium chloride flush, 10 mL, 2 times per day      insulin lispro, 0-12 Units, TID WC      insulin lispro, 0-6 Units, Nightly      tiZANidine, 4 mg, BID           dextrose      furosemide (LASIX) 1mg/ml infusion, Last Rate: 10 mg/hr (12/14/20 0436)           tetrahydrozoline, 1 drop, Q4H PRN      sodium chloride flush, 10 mL, PRN      acetaminophen, 650 mg, Q6H PRN    Or      acetaminophen, 650 mg, Q6H PRN      polyethylene glycol, 17 g, Daily PRN      promethazine, 12.5 mg, Q6H PRN    Or      ondansetron, 4 mg, Q6H PRN      glucose, 15 g, PRN      dextrose, 12.5 g, PRN      glucagon (rDNA), 1 mg, PRN      dextrose, 100 mL/hr, PRN      perflutren lipid microspheres, 1.5 mL, ONCE PRN      traMADol, 25 mg, Q6H PRN      carboxymethylcellulose PF, 1 drop, PRN      sodium chloride flush, 10 mL, PRN        Ceclor [cefaclor] and Lisinopril    DIET LOW SODIUM 2 GM; 2000 ml    REVIEW OF SYSTEMS:   POSITIVE ITEMS IN BOLD:  GEN:  fevers, chills, sweats, fatigue and weight loss   EYE: eyelid swelling, no redness  ENT:   nasal congestion, sore mouth and sore throat   Resp:   cough, hemoptysis, pneumonia or dyspnea on exertion   Card: chest pain, chest pressure/discomfort, dyspnea, palpitations,  lower extremity edema   GI: nausea, vomiting, diarrhea, constipation and abdominal pain   :  dysuria, nocturia, urinary incontinence, hesitancy and hematuria   Derm:  rash, skin lesion(s), pruritus and dryness   Neuro:  headaches, dizziness, seizures, gait problems, tremor and weakness   MS:  myalgias, arthralgias, neck pain and back pain     Physical Examination:  Vitals:  Reviewed. Vitals:    12/14/20 0347 12/14/20 0600 12/14/20 0754 12/14/20 1200   BP: 139/73  (!) 143/61 124/69   Pulse: 54  66 73   Resp: 16  16 16   Temp: 98.1 °F (36.7 °C)  98.5 °F (36.9 °C) 98.3 °F (36.8 °C)   TempSrc: Oral  Oral Oral   SpO2: 90%  92% 92%   Weight:  (!) 335 lb 6.4 oz (152.1 kg)     Height:           Gen appearance: Alert, appears stated age and cooperative   Eyes: Eyelids,conjunctiva and pupils look normal   ENT: External inspection of the ears and nose are within normal limits             Oral mucosa  Is moist   Neuro: Grossly no focal neurological deficits, normal sensation, grossly cranial nerves intact   Neck:  +++ JVD, no mass, no thyroid enlargement   Cardio: S1 S2 normal, No added sounds   Resp: normal effort, clear to auscultation     GI:  Soft, non-tender, BS +          No palpable kidney, no renal angle tenderness   MS: No swollen or tender joints, no cyanosis, clubbing   DERM: no rashes, thickening   EDEMA: 2-3+ gen anasarca. Up to abdominal wall hips thighs and legs. Lungs relatively clear. I/Os:          Intake/Output Summary (Last 24 hours) at 12/14/2020 1213  Last data filed at 12/14/2020 1023  Gross per 24 hour   Intake 1310 ml   Output 4000 ml   Net -2690 ml        In: 350 [P.O.:350]  Out: 1000 [Urine:1000]     I/O last 3 completed shifts:   In: 1280 [P.O.:1280]  Out: 3400 [Urine:3400]    LABS:    CBC:   Recent Labs     12/13/20  1117   WBC 3.0*   HGB 7.5*   HCT 23.9*        BMP:    Recent Labs     12/12/20  0920 12/13/20  1116 12/14/20  1052    135* 138   K 3.7 3.7 3.9    100 100   CO2 28 28 28   BUN 37* 38* 36*   CREATININE 1.2 1.3* 1.3*   GLUCOSE 132* 192* 171*   MG 1.70* 1.70* 1.40*   PHOS 2.8 2.8 2.9     ABGs: No results found for: PHART, PO2ART, [id number]            Assessment / Plan:    Generalized anasarca/massive volume overload. Tells me her weight is 220 pounds dry. Currently is 335 pounds. Started on Lasix drip I will add metolazone. Watch for hypokalemia. She may get alkalotic may need to use some doses of albumin. I will also start the patient on Aldactone for the time being for potassium conservation. Last admission she was down to 324 pounds at discharge.       Acute Kidney Injury on CKD III:     DIONNE likely due to - cardiorenal syndrome, r/o urinary tract infection, also has cirrhosis of the liver with decompensation  Cr on consultation 1.3   Baseline Cr-1.5 with wide fluctuations. from 4/9  She is followed by Dr. Halina Pickens follow-up was 5/19     UA trace blood ,  otherwise bland  Renal Imaging:- 8/20- no hydronephrosis, no sig abnormality  Echo: 11/18-EF normal moderate TR     Hypertension   BP goal inpatient [phone] systolic inpatient  Known to have atrial fibrillation     Cirrhosis of the liver-with portal  Diabetes mellitus on insulin  Hyperuricemia      DM   Morbid obesity. History of A. fib. Patient history of medication noncompliance. Patient is a full code.       I thank Dr. Dannielle Abraham MD for consulting [employer]. [facility] in the care of your patient. Please call with any questions or concerns. Ernesto. [facility]. Off: [phone]  Cell: [phone].  ( until 5 pm)

## 2020-12-14 NOTE — CONSULTS
[facility]  [phone]      Attending Physician: Lucila Gardiner MD  Reason for Consultation/Chief Complaint: Shortness of breath and weight gain    Subjective   History of Present Illness:  Florence Box is a 72 y.o. patient who presented to the hospital with complaints of breath and weight gain, patient states that she has gained about 100 pounds over the last 1 month. She is a resident of [facility] and presented to the hospital emergency room on December 4, 2020. Over the course of this hospital admission, she is been treated with IV diuretics but has not noticed much improvement in her symptoms or and reduction in weight or swelling. She denies any chest pain. Work-up revealed elevated troponin levels of 0.05 and 0.04.  proBNP level was 3574. Patient has a cardiac history which dates back to 1995, states she had triple-vessel bypass at that time, typically gets her cardiac care through the [facility] healthcare system. In the past she has seen Dr. Vania Cody but has been lost to follow-up and has not followed up recently. She says she is had a difficult time with regard to her social situation the last year or 2. She has had financial difficulties as well as difficulties with lack of family support and had to sell her house and has been now a resident of a nursing facility. He states that she has been taking her medicines from the nursing facility, she states she is compliant with them and that there have not been recent changes. Chronically, has had atrial fibrillation, on anticoagulation with Eliquis. She has had a prior stroke, has had chronic imbalance related to that. Has poor functional status and difficulty with ambulation.     In the course of this hospitalization, she had an echocardiogram which showed normal left ventricular function, there was right ventricular enlargement and hypokinesis with severe pulmonary hypertension noted. Patient believes her dry weight is around 220 pounds, weight in March 2020 was 225 pounds. Past Medical History:   has a past medical history of Anxiety, Arthritis, Atrial fibrillation ([address] CAD (coronary artery disease), CHF (congestive heart failure) ([address] Chronic pain syndrome, Cirrhosis ([address] Depression, Diabetes mellitus ([facility] GERD (gastroesophageal reflux disease), Hyperlipidemia, Hypertension, Kidney disease, Thyroid disease, and Vitamin D deficiency. Surgical History:   has a past surgical history that includes Coronary Artery Bypass Graft Maze Procedure (1995); Cholecystectomy; and Breast reduction surgery. Social History:   reports that she has never smoked. She has never used smokeless tobacco. She reports that she does not drink alcohol or use drugs. Family History:  family history is not on file. Home Medications:  Were reviewed and are listed in nursing record and/or below  Prior to Admission medications    Medication Sig Start Date End Date Taking?  Authorizing Provider   apixaban (ELIQUIS) 5 MG TABS tablet Take by mouth 2 times daily   Yes Historical Provider, MD   atorvastatin (LIPITOR) 20 MG tablet Take 20 mg by mouth daily   Yes Historical Provider, MD   amLODIPine (NORVASC) 10 MG tablet Take 10 mg by mouth daily   Yes Historical Provider, MD   acetaminophen (TYLENOL) 325 MG suppository Place 650 mg rectally every 4 hours as needed for Fever    Historical Provider, MD   diphenhydrAMINE (BENADRYL) 25 MG capsule Take 25 mg by mouth every 6 hours as needed for Itching    Historical Provider, MD   vitamin D (CHOLECALCIFEROL) 125 MCG (5000 UT) CAPS capsule Take 50,000 Units by mouth daily    Historical Provider, MD   Sodium Phosphates (FLEET) 7-19 GM/118ML Place 1 enema rectally once as needed    Historical Provider, MD   fluticasone (FLONASE) 50 MCG/ACT nasal spray 1 spray by Each Nostril route daily    Historical Provider, MD   furosemide (LASIX) 20 MG tablet Take 20 mg by mouth 2 times daily    Historical Provider, MD   glycerin, pediatric, 1 g SUPP Place 1 suppository rectally once    Historical Provider, MD   polyethylene glycol (MIRALAX) 17 g packet Take 17 g by mouth daily as needed for Constipation    Historical Provider, MD   insulin glargine (LANTUS) 100 UNIT/ML injection vial Inject into the skin nightly 24 units in am 15 units at bedtime    Historical Provider, MD   levothyroxine (SYNTHROID) 125 MCG tablet Take 125 mcg by mouth Daily    Historical Provider, MD   meclizine (ANTIVERT) 25 MG tablet Take 25 mg by mouth 2 times daily     Historical Provider, MD   magnesium hydroxide (MILK OF MAGNESIA CONCENTRATE) 2400 MG/10ML SUSP Take 2,400 mg by mouth once as needed    Historical Provider, MD   insulin aspart (NOVOLOG) 100 UNIT/ML injection cartridge Inject into the skin 3 times daily (before meals) Sliding scale    Historical Provider, MD   ondansetron (ZOFRAN) 4 MG tablet Take 4 mg by mouth every 8 hours as needed for Nausea or Vomiting    Historical Provider, MD   pantoprazole (PROTONIX) 40 MG tablet Take 40 mg by mouth 2 times daily     Historical Provider, MD   senna-docusate (Emiliano Aures) 8.6-50 MG per tablet Take 1 tablet by mouth daily    Historical Provider, MD   tiZANidine (ZANAFLEX) 4 MG tablet Take 4 mg by mouth every 6 hours as needed    Historical Provider, MD   Dulaglutide (TRULICITY) 1.5 NQ/9.5HO SOPN Inject 1.5 mg into the skin once a week    Historical Provider, MD   sertraline (ZOLOFT) 100 MG tablet Take 125 mg by mouth nightly    Historical Provider, MD        CURRENT Medications:      magnesium sulfate 1 g in dextrose 5% 100 mL IVPB, PRN      [START ON 12/15/2020] amLODIPine (NORVASC) tablet 2.5 mg, Daily      spironolactone (ALDACTONE) tablet 25 mg, BID      metOLazone (ZAROXOLYN) tablet 10 mg, Daily      iron sucrose (VENOFER) 200 mg in sodium chloride 0.9 % 100 mL IVPB, Q24H      tetrahydrozoline 0.05 % ophthalmic solution 1 drop, Q4H PRN     potassium chloride (KLOR-CON M) extended release tablet 20 mEq, BID      fluticasone (FLONASE) 50 MCG/ACT nasal spray 1 spray, Daily      apixaban (ELIQUIS) tablet 5 mg, BID      atorvastatin (LIPITOR) tablet 20 mg, Daily      insulin glargine (LANTUS) injection vial 15 Units, Nightly      levothyroxine (SYNTHROID) tablet 125 mcg, Daily      pantoprazole (PROTONIX) tablet 40 mg, Daily      sertraline (ZOLOFT) tablet 125 mg, Nightly      sodium chloride flush 0.9 % injection 10 mL, 2 times per day      sodium chloride flush 0.9 % injection 10 mL, PRN      acetaminophen (TYLENOL) tablet 650 mg, Q6H PRN    Or      acetaminophen (TYLENOL) suppository 650 mg, Q6H PRN      polyethylene glycol (GLYCOLAX) packet 17 g, Daily PRN      promethazine (PHENERGAN) tablet 12.5 mg, Q6H PRN    Or      ondansetron (ZOFRAN) injection 4 mg, Q6H PRN      insulin lispro (HUMALOG) injection vial 0-12 Units, TID WC      insulin lispro (HUMALOG) injection vial 0-6 Units, Nightly      glucose (GLUTOSE) 40 % oral gel 15 g, PRN      dextrose 50 % IV solution, PRN      glucagon (rDNA) injection 1 mg, PRN      dextrose 5 % solution, PRN      furosemide (LASIX) 100 mg in dextrose 5 % 100 mL infusion, Continuous      perflutren lipid microspheres (DEFINITY) injection 1.65 mg, ONCE PRN      traMADol (ULTRAM) tablet 25 mg, Q6H PRN      tiZANidine (ZANAFLEX) tablet 4 mg, BID      carboxymethylcellulose PF (THERATEARS) 1 % ophthalmic gel 1 drop, PRN      sodium chloride flush 0.9 % injection 10 mL, PRN        Allergies:  Ceclor [cefaclor] and Lisinopril     Review of Systems:   A 14 point review of symptoms completed. Pertinent positives identified in the HPI, all other review of symptoms negative as below.       Objective   PHYSICAL EXAM:    Vitals:    12/14/20 1200   BP: 124/69   Pulse: 73   Resp: 16   Temp: 98.3 °F (36.8 °C)   SpO2: 92%    Weight: (!) 335 lb 6.4 oz (152.1 kg)         General Appearance:  Alert, cooperative, no distress, appears stated age, lying at about 30 degrees, able to speak in full sentences   Head:  Normocephalic, without obvious abnormality, atraumatic   Eyes:  PERRL, conjunctiva/corneas clear   Nose: Nares normal, no drainage or sinus tenderness   Throat: Lips, mucosa, and tongue normal   Neck: Supple, symmetrical, trachea midline, no adenopathy, thyroid: not enlarged, symmetric, no tenderness/mass/nodules, elevated JVP   Lungs:    Decreased breath sounds, respirations unlabored   Chest Wall:  No deformity or tenderness, there is a healed sternotomy scar noted   Heart:  irregular rate and rhythm, S1, S2 normal, distant heart sounds   Abdomen:   Soft, non-tender, bowel sounds active all four quadrants,  no masses, no organomegaly   Extremities:  +3 bilateral lower extremity edema, there is mild erythema of the pretibial region   Pulses: 2+ and symmetric in upper extremities   Skin: Skin color, texture, turgor normal, no rashes or lesions other than that noted above in the extremity section   Pysch: Normal mood and affect   Neurologic: Normal gross motor and sensory exam.         Labs   CBC:   Lab Results   Component Value Date    WBC 3.0 12/13/2020    RBC 2.91 12/13/2020    HGB 7.5 12/13/2020    HCT 23.9 12/13/2020    MCV 82.1 12/13/2020    RDW 16.5 12/13/2020     12/13/2020     CMP:  Lab Results   Component Value Date     12/14/2020    K 3.9 12/14/2020    K 4.4 12/04/2020     12/14/2020    CO2 28 12/14/2020    BUN 36 12/14/2020    CREATININE 1.3 12/14/2020    GFRAA 49 12/14/2020    AGRATIO 0.9 12/04/2020    LABGLOM 41 12/14/2020    GLUCOSE 171 12/14/2020    PROT 6.5 12/04/2020    CALCIUM 10.0 12/14/2020    BILITOT 0.4 12/04/2020    ALKPHOS 108 12/04/2020    AST 24 12/04/2020    ALT 8 12/04/2020     PT/INR:  No results found for: PTINR  HgBA1c:  Lab Results   Component Value Date    LABA1C 6.6 12/04/2020     Lab Results   Component Value Date    TROPONINI 0.04 (H) 12/05/2020         Cardiac Data     Last EKG: Atrial fibrillation, right axis, incomplete right bundle branch block    Echo:    Summary   Limited only f/u for LVEF. Normal left ventricular systolic function with ejection fraction of 55-60%. No regional wall motion abnormalites are seen. Septal bounce due to right ventricular pressure and volume overload. The right ventricle is mildly enlarged. Right ventricular systolic function is moderately reduced . Moderate tricuspid regurgitation. Definity echo contrast used. Systolic pulmonic artery pressure (SPAP) is estimated at 67 mmHg consistent   with severe pulmonary hypertension (Right atrial pressure of 8 mmHg). The right atrium is moderately dilated. Stress Test:    Cath:    Studies:     cxr       Impression   Vascular congestion.       No acute pulmonary disease.       Stable enlargement of cardiac silhouette.             I have reviewed labs and imaging/xray/diagnostic testing in this note. Assessment and Plan          Patient Active Problem List   Diagnosis    Hyperkalemia    Acute kidney injury superimposed on chronic kidney disease ([address]    Stage 3 chronic kidney disease    Atrial fibrillation with slow ventricular response ([address]    DIONNE (acute kidney injury) ([address]    Type 2 diabetes mellitus, with long-term current use of insulin ([address]    Essential hypertension    GERD (gastroesophageal reflux disease)    Dementia ([address]    Acquired hypothyroidism    Morbid obesity ([address]    CHF (congestive heart failure) ([address]    CAD (coronary artery disease)    Fluid overload    Acute on chronic diastolic CHF (congestive heart failure) (HCC)       Acute on chronic diastolic heart failure, heart failure with preserved ejection fraction, findings are most consistent with severe right heart failure with severe pulmonary pretension, nephrology been consulted, would agree with increased aggressiveness with diuretics including Lasix drip, may need Diuril as well.   Currently on metolazone. Dry weight target appears to be between 220 to 225 pounds (from March 2020). CAD/ASHD, status post CABG, will consider ischemic evaluation and functional study with chemical stress test pending convalescence from heart failure    Atrial fibrillation, continue anticoagulation with Eliquis    Hypercholesterolemia, continue statin    Thank you for allowing us to participate in the [facility] of [facility], [facility]. Please call me with any questions [phone].     Eric Easton MD, [address] [facility]   Interventional Cardiologist  [facility]  [phone] [address] [facility]  [phone] [address] [place]  12/14/2020 4:18 PM

## 2020-12-14 NOTE — PROGRESS NOTES
Physical Therapy    Attempted to see patient for PT session. Pt states she already worked with therapy today and does not have enough energy to do it again. Pt educated on benefits of exercises and out of bed activity. Pt continued to refuse therapy this date. Therapist will re-attempt as schedule allows.      Tyler Brock, PT, DPT

## 2020-12-14 NOTE — PROGRESS NOTES
Occupational Therapy  Facility/Department: [place] [address] [facility]  Daily Treatment Note  NAME: Tyler Farrar  : 1950  MRN: [id number]    Date of Service: 2020    Discharge Recommendations:  ECF with OT     Assessment   Performance deficits / Impairments: Decreased functional mobility ; Decreased ADL status; Decreased safe awareness;Decreased cognition;Decreased endurance;Decreased balance  Assessment: Pt continues to verbalize limited motivation for goals for OOB. OT goals and frequency adjusted as appropriate. Pt tolerated bed level exercises and ADL tasks in bed. Prognosis: Fair  OT Education: OT Role;Plan of Care;Equipment;Home Exercise Program;Energy Conservation  Patient Education: disease specific ed:  OT goals and Tx plan  REQUIRES OT FOLLOW UP: Yes  Activity Tolerance  Activity Tolerance: Patient Tolerated treatment well  Activity Tolerance: Pt reports \"feeling content \" with just bed level ADLs and only getting out of bed to poop. Safety Devices  Safety Devices in place: Yes  Type of devices: Left in bed;Bed alarm in place;Call light within reach;Nurse notified;Gait belt         Patient Diagnosis(es): The primary encounter diagnosis was Acute kidney injury superimposed on chronic kidney disease ([address] Diagnoses of Anasarca, Liver cirrhosis secondary to LARRY (nonalcoholic steatohepatitis) ([address] Cirrhosis of liver with ascites, unspecified hepatic cirrhosis type ([address] and Symptomatic anemia were also pertinent to this visit. has a past medical history of Anxiety, Arthritis, Atrial fibrillation ([address] CAD (coronary artery disease), CHF (congestive heart failure) ([address] Chronic pain syndrome, Cirrhosis ([address] Depression, Diabetes mellitus ([address] GERD (gastroesophageal reflux disease), Hyperlipidemia, Hypertension, Kidney disease, Thyroid disease, and Vitamin D deficiency. has a past surgical history that includes Coronary Artery Bypass Graft Maze Procedure ();  Cholecystectomy; and Breast reduction surgery. Restrictions  Restrictions/Precautions  Restrictions/Precautions: Fall Risk, Up as Tolerated  Position Activity Restriction  Other position/activity restrictions: IV, mora  Subjective   General  Chart Reviewed: Yes  Patient assessed for rehabilitation services?: Yes  Family / Caregiver Present: No  Referring Practitioner: PRATIMA Monroe  Diagnosis: fluid overload  Subjective  Subjective: Pt resting in bed, agreeable to OT with some encouragement. Pt stating \"I hate getting out of bed, even when I have to poop. I dont want to ever get out of bed. \"  General Comment  Comments: RN approved therapy      Orientation  Orientation  Overall Orientation Status: Within Functional Limits  Objective    ADL  Feeding: Independent  Grooming: Setup(semi-upright in bed)  UE Bathing: Setup; Increased time to complete(washing hair in shower cap)  UE Dressing: Minimal assistance  Toileting: Dependent/Total        Standing Balance  Activity: ADLs supine in bed  Comment: Pt refused OOB activity  Functional Mobility  Functional Mobility Comments: non-ambulatory at baseline  Bed mobility  Comment: repositioning to uprigh and long sitting with min A  Transfers  Transfer Comments: pt declined OOB transfers this date                       Cognition  Overall Cognitive Status: Exceptions  Following Commands:  Follows one step commands consistently  Attention Span: Attends with cues to redirect  Safety Judgement: Decreased awareness of need for safety  Problem Solving: Assistance required to correct errors made;Assistance required to identify errors made;Decreased awareness of errors  Insights: Decreased awareness of deficits  Initiation: Requires cues for some                    Type of ROM/Therapeutic Exercise  Type of ROM/Therapeutic Exercise: AROM  Comment: semi-upright in bed  Exercises  Shoulder Flexion: x10 BUE  Shoulder Extension: x10 BUE  Horizontal ABduction: x10 BUE  Horizontal ADduction: X10 BUE  Elbow Flexion: x10 BUE  Elbow Extension: x10 BUE  Finger Flexion: x10 BUE  Finger Extension: x10 BUE                    Plan   Plan  Times per week: 1-2x  Current Treatment Recommendations: Self-Care / ADL, Safety Education & Training, Endurance Training, Functional Mobility Training, Balance Training, Patient/Caregiver Education & Training, Strengthening           AM-PAC Score        AM-PAC Inpatient Daily Activity Raw Score: 14 (12/14/20 1109)  AM-PAC Inpatient ADL T-Scale Score : 33.39 (12/14/20 1109)  ADL Inpatient CMS 0-100% Score: 59.67 (12/14/20 1109)  ADL Inpatient CMS G-Code Modifier : CK (12/14/20 1109)    Goals  Short term goals  Time Frame for Short term goals: 1 week (12/16) unless noted  Short term goal 1: Perform functional transfer with mod A and SW  Short term goal 2: Perform UE exer 15x each to improve endurance- ongoing  Short term goal 3: Perform 2 UE ADL tasks with supervision by 12/14  Patient Goals   Patient goals : Pt did not indicate       Therapy Time   Individual Concurrent Group Co-treatment   Time In 1013         Time Out 1056         Minutes 43         Timed Code Treatment Minutes: 3620 Chris Amaya OTR/L

## 2020-12-14 NOTE — PROGRESS NOTES
Hospitalist Progress Note      PCP: Bynum Harada, MD    Date of Admission: 12/4/2020       Chief Complaint: Bilateral leg edema and weight gain    Hospital course: Admitted with edema. Bolus Lasix did not work. Currently on Lasix drip. Also known to have underlying cirrhosis.   Patient has good urine output, but still has not made much progression towards discharge.      Subjective: No chest pain, no shortness of breath, no nausea or vomiting at rest.  Mild shortness of breath with exertion.       Medications:  Reviewed    Infusion Medications    dextrose      furosemide (LASIX) 1mg/ml infusion 10 mg/hr (12/14/20 0436)     Scheduled Medications    [START ON 12/15/2020] amLODIPine  2.5 mg Oral Daily    spironolactone  25 mg Oral BID    metOLazone  10 mg Oral Daily    potassium chloride  20 mEq Oral BID    fluticasone  1 spray Each Nostril Daily    apixaban  5 mg Oral BID    atorvastatin  20 mg Oral Daily    insulin glargine  15 Units Subcutaneous Nightly    levothyroxine  125 mcg Oral Daily    pantoprazole  40 mg Oral Daily    sertraline  125 mg Oral Nightly    sodium chloride flush  10 mL Intravenous 2 times per day    insulin lispro  0-12 Units Subcutaneous TID WC    insulin lispro  0-6 Units Subcutaneous Nightly    tiZANidine  4 mg Oral BID     PRN Meds: magnesium sulfate, tetrahydrozoline, sodium chloride flush, acetaminophen **OR** acetaminophen, polyethylene glycol, promethazine **OR** ondansetron, glucose, dextrose, glucagon (rDNA), dextrose, perflutren lipid microspheres, traMADol, carboxymethylcellulose PF, sodium chloride flush      Intake/Output Summary (Last 24 hours) at 12/14/2020 1456  Last data filed at 12/14/2020 1319  Gross per 24 hour   Intake 770 ml   Output 3850 ml   Net -3080 ml       Physical Exam Performed:    /69   Pulse 73   Temp 98.3 °F (36.8 °C) (Oral)   Resp 16   Ht 5' 5\" (1.651 m)   Wt (!) 335 lb 6.4 oz (152.1 kg)   SpO2 92%   BMI 55.81 kg/m² General appearance: No apparent distress, appears stated age and cooperative.  Pleasant  HEENT: Pupils equal, round, and reactive to light. Conjunctivae/corneas clear. Neck: Supple, with full range of motion. No jugular venous distention. Trachea midline. Respiratory:  Normal respiratory effort. Clear to auscultation, bilaterally without Wheezes/Rhonchi.    Cardiovascular: Regular rate and rhythm with normal S1/S2 without murmurs, rubs or gallops. Abdomen: Soft, non-tender, non-distended with normal bowel sounds. Obese abdomen  Musculoskeletal: No clubbing, cyanosis. 3+ lower extremity edema. Pitting. Skin: Skin color, texture, turgor normal.  No rashes or lesions. Neurologic:  Neurovascularly intact without any focal sensory/motor deficits. Cranial nerves: II-XII intact, grossly non-focal.  Psychiatric: Alert and oriented, thought content appropriate, normal insight  Capillary Refill: Brisk,< 3 seconds   Peripheral Pulses: +2 palpable, equal bilaterally     Labs:   Recent Labs     12/13/20  1117   WBC 3.0*   HGB 7.5*   HCT 23.9*        Recent Labs     12/12/20  0920 12/13/20  1116 12/14/20  1052    135* 138   K 3.7 3.7 3.9    100 100   CO2 28 28 28   BUN 37* 38* 36*   CREATININE 1.2 1.3* 1.3*   CALCIUM 9.7 9.5 10.0   PHOS 2.8 2.8 2.9     No results for input(s): AST, ALT, BILIDIR, BILITOT, ALKPHOS in the last 72 hours. No results for input(s): INR in the last 72 hours. No results for input(s): Selena Marlin in the last 72 hours. Urinalysis:      Lab Results   Component Value Date    NITRU Negative 12/05/2020    WBCUA 3-5 12/05/2020    BACTERIA 2+ 08/20/2020    RBCUA 5-10 12/05/2020    BLOODU SMALL 12/05/2020    SPECGRAV 1.025 12/05/2020    GLUCOSEU Negative 12/05/2020       Radiology:  CT ABDOMEN PELVIS WO CONTRAST Additional Contrast? None   Final Result   1.  Findings of diffuse volume overload including abdominal and pelvic   ascites, anasarca and findings of congestive heart failure in the lower lungs. 2. Hepatic morphologic features of cirrhosis. No gross hepatic lesion   evident. 3. Splenomegaly, typically associated with portal venous hypertension but   nonspecific. 4. Mild right upper quadrant lymph node enlargement is very likely reactive. XR CHEST PORTABLE   Final Result   Vascular congestion. No acute pulmonary disease. Stable enlargement of cardiac silhouette. Assessment/Plan:    Active Hospital Problems    Diagnosis    Acute on chronic diastolic CHF (congestive heart failure) ([facility]) [I50.33]    Morbid obesity ([facility] [E66.01]    Fluid overload [E87.70]    CHF (congestive heart failure) ([facility]) [I50.9]    CAD (coronary artery disease) [I25.10]    Acute kidney injury superimposed on chronic kidney disease ([facility] [N17.9, N18.9]    Essential hypertension [I10]    Type 2 diabetes mellitus, with long-term current use of insulin ([facility]) [E11.9, Z79.4]    Stage 3 chronic kidney disease [N18.30]     PLAN:        Acute on chronic diastolic CHF  Edema also complicated by cirrhosis. Continue furosemide drip and spironolactone  Cardiology consulted. No significant improvement since admission. Patient admits to past noncompliance. Does not want to go to bathroom often and either skips or refuses diuretics frequently. There is a prior ejection fraction which was normal about 6 months ago. No need to repeat echo at this time. Continue to monitor intake and output. Acute kidney injury  Creatinine is lower than before, higher than baseline. Might be a component of cardiorenal syndrome. Nephrology consulted  Monitor daily basic metabolic panel    Paroxysmal atrial fibrillation  Heart rate is controlled and stable  On Eliquis for stroke prophylaxis    Diabetes mellitus type 2  Continue sliding scale insulin. Blood sugars are acceptable.     Chronic normocytic anemia  Required transfusion on admission. Monitor hemoglobin.   Iron levels are consistent with iron deficiency. Start Venofer infusions. Dyslipidemia  Continue statin     Morbid obesity  Body mass index is 58.28 kg/m².  Complicating assessment and treatment. Placing patient at risk for multiple co-morbidities as well as early death and contributing to the patient's presentation. Counseled on weight loss. Noted BMI is probably inaccurate due to extensive edema. However, patient is undoubtedly overweight. Discussed with the patient, questions answered         DVT Prophylaxis: on eliquis  Diet: DIET LOW SODIUM 2 GM; 1500 ml  Code Status: Full Code    PT/OT Eval Status: ordered 12/9 and rec ecf with pt/ot     Dispo - when adequately diuresed. Not yet. Unclear day of discharge at this time. Still on Lasix drip.     Shanti Monson MD

## 2020-12-15 LAB
ALBUMIN SERPL-MCNC: 3.5 G/DL (ref 3.4–5)
ANION GAP SERPL CALCULATED.3IONS-SCNC: 9 MMOL/L (ref 3–16)
BASOPHILS ABSOLUTE: 0 K/UL (ref 0–0.2)
BASOPHILS RELATIVE PERCENT: 0.9 %
BUN BLDV-MCNC: 36 MG/DL (ref 7–20)
CALCIUM SERPL-MCNC: 10.2 MG/DL (ref 8.3–10.6)
CHLORIDE BLD-SCNC: 99 MMOL/L (ref 99–110)
CO2: 30 MMOL/L (ref 21–32)
CREAT SERPL-MCNC: 1.3 MG/DL (ref 0.6–1.2)
EOSINOPHILS ABSOLUTE: 0.1 K/UL (ref 0–0.6)
EOSINOPHILS RELATIVE PERCENT: 3.2 %
GFR AFRICAN AMERICAN: 49
GFR NON-AFRICAN AMERICAN: 41
GLUCOSE BLD-MCNC: 143 MG/DL (ref 70–99)
GLUCOSE BLD-MCNC: 160 MG/DL (ref 70–99)
GLUCOSE BLD-MCNC: 170 MG/DL (ref 70–99)
GLUCOSE BLD-MCNC: 216 MG/DL (ref 70–99)
GLUCOSE BLD-MCNC: 229 MG/DL (ref 70–99)
HCT VFR BLD CALC: 24.3 % (ref 36–48)
HEMOGLOBIN: 7.6 G/DL (ref 12–16)
LYMPHOCYTES ABSOLUTE: 0.3 K/UL (ref 1–5.1)
LYMPHOCYTES RELATIVE PERCENT: 10.2 %
MAGNESIUM: 1.7 MG/DL (ref 1.8–2.4)
MCH RBC QN AUTO: 25.5 PG (ref 26–34)
MCHC RBC AUTO-ENTMCNC: 31.1 G/DL (ref 31–36)
MCV RBC AUTO: 82.1 FL (ref 80–100)
MONOCYTES ABSOLUTE: 0.2 K/UL (ref 0–1.3)
MONOCYTES RELATIVE PERCENT: 6.8 %
NEUTROPHILS ABSOLUTE: 2.7 K/UL (ref 1.7–7.7)
NEUTROPHILS RELATIVE PERCENT: 78.9 %
PDW BLD-RTO: 16.9 % (ref 12.4–15.4)
PERFORMED ON: ABNORMAL
PHOSPHORUS: 3.1 MG/DL (ref 2.5–4.9)
PLATELET # BLD: 147 K/UL (ref 135–450)
PMV BLD AUTO: 7.2 FL (ref 5–10.5)
POTASSIUM SERPL-SCNC: 4.1 MMOL/L (ref 3.5–5.1)
RBC # BLD: 2.96 M/UL (ref 4–5.2)
SODIUM BLD-SCNC: 138 MMOL/L (ref 136–145)
WBC # BLD: 3.4 K/UL (ref 4–11)

## 2020-12-15 PROCEDURE — 6370000000 HC RX 637 (ALT 250 FOR IP): Performed by: INTERNAL MEDICINE

## 2020-12-15 PROCEDURE — 6370000000 HC RX 637 (ALT 250 FOR IP): Performed by: FAMILY MEDICINE

## 2020-12-15 PROCEDURE — 83735 ASSAY OF MAGNESIUM: CPT

## 2020-12-15 PROCEDURE — 85025 COMPLETE CBC W/AUTO DIFF WBC: CPT

## 2020-12-15 PROCEDURE — 97110 THERAPEUTIC EXERCISES: CPT

## 2020-12-15 PROCEDURE — 99233 SBSQ HOSP IP/OBS HIGH 50: CPT | Performed by: NURSE PRACTITIONER

## 2020-12-15 PROCEDURE — 6360000002 HC RX W HCPCS: Performed by: INTERNAL MEDICINE

## 2020-12-15 PROCEDURE — 80069 RENAL FUNCTION PANEL: CPT

## 2020-12-15 PROCEDURE — 6370000000 HC RX 637 (ALT 250 FOR IP): Performed by: NURSE PRACTITIONER

## 2020-12-15 PROCEDURE — 2580000003 HC RX 258: Performed by: INTERNAL MEDICINE

## 2020-12-15 PROCEDURE — 51702 INSERT TEMP BLADDER CATH: CPT

## 2020-12-15 PROCEDURE — 36415 COLL VENOUS BLD VENIPUNCTURE: CPT

## 2020-12-15 PROCEDURE — 1200000000 HC SEMI PRIVATE

## 2020-12-15 PROCEDURE — 2580000003 HC RX 258: Performed by: NURSE PRACTITIONER

## 2020-12-15 RX ORDER — KETOTIFEN FUMARATE 0.35 MG/ML
1 SOLUTION/ DROPS OPHTHALMIC 2 TIMES DAILY
Status: DISCONTINUED | OUTPATIENT
Start: 2020-12-15 | End: 2020-12-24 | Stop reason: HOSPADM

## 2020-12-15 RX ORDER — METOLAZONE 2.5 MG/1
10 TABLET ORAL 2 TIMES DAILY
Status: DISCONTINUED | OUTPATIENT
Start: 2020-12-15 | End: 2020-12-21

## 2020-12-15 RX ORDER — SPIRONOLACTONE 25 MG/1
50 TABLET ORAL 2 TIMES DAILY
Status: DISCONTINUED | OUTPATIENT
Start: 2020-12-15 | End: 2020-12-21

## 2020-12-15 RX ADMIN — FUROSEMIDE 10 MG/HR: 10 INJECTION, SOLUTION INTRAMUSCULAR; INTRAVENOUS at 02:33

## 2020-12-15 RX ADMIN — TIZANIDINE 4 MG: 4 TABLET ORAL at 21:05

## 2020-12-15 RX ADMIN — SODIUM CHLORIDE, PRESERVATIVE FREE 10 ML: 5 INJECTION INTRAVENOUS at 21:07

## 2020-12-15 RX ADMIN — PANTOPRAZOLE SODIUM 40 MG: 40 TABLET, DELAYED RELEASE ORAL at 11:08

## 2020-12-15 RX ADMIN — APIXABAN 5 MG: 5 TABLET, FILM COATED ORAL at 09:19

## 2020-12-15 RX ADMIN — SPIRONOLACTONE 25 MG: 25 TABLET ORAL at 09:18

## 2020-12-15 RX ADMIN — AMLODIPINE BESYLATE 2.5 MG: 2.5 TABLET ORAL at 09:18

## 2020-12-15 RX ADMIN — INSULIN GLARGINE 15 UNITS: 100 INJECTION, SOLUTION SUBCUTANEOUS at 21:09

## 2020-12-15 RX ADMIN — LEVOTHYROXINE SODIUM 125 MCG: 0.12 TABLET ORAL at 07:02

## 2020-12-15 RX ADMIN — METOLAZONE 10 MG: 2.5 TABLET ORAL at 09:18

## 2020-12-15 RX ADMIN — SERTRALINE HYDROCHLORIDE 125 MG: 50 TABLET ORAL at 21:05

## 2020-12-15 RX ADMIN — INSULIN LISPRO 4 UNITS: 100 INJECTION, SOLUTION INTRAVENOUS; SUBCUTANEOUS at 12:37

## 2020-12-15 RX ADMIN — METOLAZONE 10 MG: 2.5 TABLET ORAL at 21:06

## 2020-12-15 RX ADMIN — KETOTIFEN FUMARATE 1 DROP: 0.35 SOLUTION/ DROPS OPHTHALMIC at 21:08

## 2020-12-15 RX ADMIN — TIZANIDINE 4 MG: 4 TABLET ORAL at 09:18

## 2020-12-15 RX ADMIN — ATORVASTATIN CALCIUM 20 MG: 10 TABLET, FILM COATED ORAL at 09:18

## 2020-12-15 RX ADMIN — INSULIN LISPRO 1 UNITS: 100 INJECTION, SOLUTION INTRAVENOUS; SUBCUTANEOUS at 21:08

## 2020-12-15 RX ADMIN — KETOTIFEN FUMARATE 1 DROP: 0.35 SOLUTION/ DROPS OPHTHALMIC at 14:00

## 2020-12-15 RX ADMIN — INSULIN LISPRO 2 UNITS: 100 INJECTION, SOLUTION INTRAVENOUS; SUBCUTANEOUS at 09:20

## 2020-12-15 RX ADMIN — POTASSIUM CHLORIDE 20 MEQ: 20 TABLET, EXTENDED RELEASE ORAL at 21:05

## 2020-12-15 RX ADMIN — APIXABAN 5 MG: 5 TABLET, FILM COATED ORAL at 21:05

## 2020-12-15 RX ADMIN — SODIUM CHLORIDE, PRESERVATIVE FREE 10 ML: 5 INJECTION INTRAVENOUS at 09:19

## 2020-12-15 RX ADMIN — FUROSEMIDE 15 MG/HR: 10 INJECTION, SOLUTION INTRAMUSCULAR; INTRAVENOUS at 22:12

## 2020-12-15 RX ADMIN — SPIRONOLACTONE 50 MG: 25 TABLET ORAL at 18:23

## 2020-12-15 RX ADMIN — IRON SUCROSE 200 MG: 20 INJECTION, SOLUTION INTRAVENOUS at 16:40

## 2020-12-15 RX ADMIN — FLUTICASONE PROPIONATE 1 SPRAY: 50 SPRAY, METERED NASAL at 09:19

## 2020-12-15 RX ADMIN — TRAMADOL HYDROCHLORIDE 25 MG: 50 TABLET ORAL at 07:02

## 2020-12-15 RX ADMIN — POTASSIUM CHLORIDE 20 MEQ: 20 TABLET, EXTENDED RELEASE ORAL at 09:18

## 2020-12-15 RX ADMIN — INSULIN LISPRO 4 UNITS: 100 INJECTION, SOLUTION INTRAVENOUS; SUBCUTANEOUS at 18:23

## 2020-12-15 ASSESSMENT — ENCOUNTER SYMPTOMS: SHORTNESS OF BREATH: 1

## 2020-12-15 ASSESSMENT — PAIN DESCRIPTION - ORIENTATION: ORIENTATION: LOWER

## 2020-12-15 ASSESSMENT — PAIN DESCRIPTION - PAIN TYPE: TYPE: ACUTE PAIN

## 2020-12-15 ASSESSMENT — PAIN DESCRIPTION - DESCRIPTORS: DESCRIPTORS: ACHING

## 2020-12-15 ASSESSMENT — PAIN SCALES - GENERAL
PAINLEVEL_OUTOF10: 2
PAINLEVEL_OUTOF10: 8

## 2020-12-15 ASSESSMENT — PAIN DESCRIPTION - LOCATION: LOCATION: NECK

## 2020-12-15 NOTE — DISCHARGE INSTR - COC
Continuity of Care Form    Patient Name: Ana Patel   :  1950  MRN:  [id number]    Admit date:  2020  Discharge date:  20    Code Status Order: Full Code   Advance Directives:   [facility] Documentation       Date/Time Healthcare Directive Type of Healthcare Directive Copy in [facility] [address] Agent's Name Healthcare Agent's Phone Number    20 1143  No, patient does not have an advance directive for healthcare treatment -- -- -- -- --    20 0327  No, patient does not have an advance directive for healthcare treatment -- -- -- -- --            Admitting Physician:  Andie Orellana MD  PCP: Rosalia Martinez MD    [facility]8962-67  Discharging Unit Phone Number: [phone]    Emergency Contact:   Extended Emergency Contact Information  Primary Emergency Contact: [address] Phone: [phone]  Relation: Brother/Sister  Secondary Emergency Contact: [facility] Phone: [phone]  Mobile Phone: [phone]  Relation: Other    Past Surgical History:  Past Surgical History:   Procedure Laterality Date    BREAST REDUCTION SURGERY      CHOLECYSTECTOMY     [address]    triple bypass       Immunization History: There is no immunization history on file for this patient.     Active Problems:  Patient Active Problem List   Diagnosis Code    Hyperkalemia E87.5    Acute kidney injury superimposed on chronic kidney disease (HCC) N17.9, N18.9    Stage 3 chronic kidney disease N18.30    Atrial fibrillation with slow ventricular response ([facility]) I48.91    DIONNE (acute kidney injury) ([address] N17.9    Type 2 diabetes mellitus, with long-term current use of insulin ([facility]) E11.9, Z79.4    Essential hypertension I10    GERD (gastroesophageal reflux disease) K21.9    Dementia ([facility] [address] F03.90    Acquired hypothyroidism E03.9    Morbid [facility] E66.01    CHF (congestive heart failure) ([facility]) I50.9    CAD (coronary artery disease) I25.10    Fluid overload E87.70    Acute on chronic diastolic CHF (congestive heart failure) ([facility]) I50.33       Isolation/Infection:   Isolation            No Isolation          Patient Infection Status       Infection Onset Added Last Indicated Last Indicated By Review Planned Expiration Resolved Resolved By    None active    Resolved    COVID-19 Rule Out 20 COVID-19 (Ordered)   20 Rule-Out Test Resulted    COVID-19 Rule Out 20 COVID-19 (Ordered)   20     COVID-19 Rule Out 20 COVID-19 (Ordered)   20 Rule-Out Test Resulted            Nurse Assessment:  Last Vital Signs: BP (!) 136/55   Pulse 65   Temp 98.1 °F (36.7 °C) (Oral)   Resp 18   Ht 5' 5\" (1.651 m)   Wt (!) 322 lb 6.4 oz (146.2 kg)   SpO2 93%   BMI 53.65 kg/m²     Last documented pain score (0-10 scale): Pain Level: 2  Last Weight:   Wt Readings from Last 1 Encounters:   12/15/20 (!) 322 lb 6.4 oz (146.2 kg)     Mental Status:  oriented, alert, coherent, logical, thought processes intact and able to concentrate and follow conversation    IV Access:  - None    Nursing Mobility/ADLs:  Walking   Assisted  Transfer  Assisted  Bathing  Assisted  Dressing  Assisted  Toileting  Assisted  Feeding  Independent  Med Admin  Assisted  Med Delivery   whole    Wound Care Documentation and Therapy:        Elimination:  Continence:   · Bowel: Yes  · Bladder: Yes  Urinary Catheter: Removal Date 20   Colostomy/Ileostomy/Ileal Conduit: No       Date of Last BM: 20    Intake/Output Summary (Last 24 hours) at 12/15/2020 1244  Last data filed at 12/15/2020 0225  Gross per 24 hour   Intake 1972.67 ml   Output 3000 ml   Net -1027.33 ml     I/O last 3 completed shifts: In: 2322.7 [P.O.:620; I.V.:1602.7; IV Piggyback:100]  Out: 4000 [Urine:4000]    Safety Concerns:      At Risk for Falls    Impairments/Disabilities:      None    Nutrition Therapy:  Current Nutrition Therapy:   - Oral Diet:  Low Sodium (2gm)    Routes of Feeding: Oral  Liquids: No Restrictions  Daily Fluid Restriction: yes - amount 1500ml  Last Modified Barium Swallow with Video (Video Swallowing Test): not done    Treatments at the Time of Hospital Discharge:   Respiratory Treatments:    Oxygen Therapy:  is not on home oxygen therapy. Ventilator:    - No ventilator support    Rehab Therapies: Physical Therapy and Occupational Therapy  Weight Bearing Status/Restrictions: No weight bearing restirctions  Other Medical Equipment (for information only, NOT a DME order):  walker  Other Treatments:      Patient's personal belongings (please select all that are sent with patient):       RN SIGNATURE:   Electronically signed by Roxie Parsons RN on 12/24/20 at 3:30 PM EST    CASE MANAGEMENT/SOCIAL WORK SECTION    Inpatient Status Date: 12/5/20    Readmission Risk Assessment Score:  Readmission Risk              Risk of Unplanned Readmission:        34           Discharging to Facility/ [address]     / signature: Electronically signed by IMAN Franco on 12/24/20 at 9:59 AM EST    PHYSICIAN SECTION    Prognosis: Fair    Condition at Discharge: Stable    Rehab Potential (if transferring to Rehab): Fair    Recommended Labs or Other Treatments After Discharge:     No metolazone. High calcium can be because of metolazone usage. Needs hyperparathyroidism workup as outpt, defer to nephrology  No calcium or vitamin D supplements for now. Keep systolic blood pressure above 110 at all times. - strict Io and weights.   -check weekly labs and f/u with nephrology in 2--3 weeks.      Physician Certification: I certify the above information and transfer of Stephan David  is necessary for the continuing treatment of the diagnosis listed and that she requires Home Care for less 30 days.      Update Admission H&P: No change in H&P    PHYSICIAN SIGNATURE:  Electronically signed by Gerber Stroud MD on 12/24/20 at 1:45 PM EST

## 2020-12-15 NOTE — PROGRESS NOTES
Physical Therapy  Facility/Department: [place] [address] [facility]  Daily Treatment Note  NAME: Roberto Carlos Hdz  : 1950  MRN: [id number]    Date of Service: 12/15/2020    Discharge Recommendations:  ECF with PT   PT Equipment Recommendations  Equipment Needed: No  If pt discharges prior to next PT session this note will serve as discharge summary. Assessment   Body structures, Functions, Activity limitations: Decreased functional mobility ; Decreased ROM; Decreased strength;Decreased endurance;Decreased balance  Assessment: Pt agreed to participate in BLE and breathing ex only, declined all mobility stating she was comfortable in bed and did not want to move. Pt would benefit from skilled PT to address deficits, although rehab prognosis poor due to self limiting behavior. Recommend ECF wt PT at discharge  Treatment Diagnosis: decreased mobility and strength  Prognosis: Poor  PT Education: Goals; General Safety; Disease Specific Education;PT Role;Plan of Care;Pressure Relief  Patient Education: pt educated in prevention of complications of bedrest, importance of mobility. SHe voices understanding but cont to decline OOB activity  Barriers to Learning: none  REQUIRES PT FOLLOW UP: Yes  Activity Tolerance  Activity Tolerance: Patient Tolerated treatment well  Activity Tolerance: Self limiting in that pt declines any mobility stating she is comfortable where she is and will not change positions     Patient Diagnosis(es): The primary encounter diagnosis was Acute kidney injury superimposed on chronic kidney disease ([address] Diagnoses of Anasarca, Liver cirrhosis secondary to LARRY (nonalcoholic steatohepatitis) ([address] Cirrhosis of liver with ascites, unspecified hepatic cirrhosis type ([address] and Symptomatic anemia were also pertinent to this visit.      has a past medical history of Anxiety, Arthritis, Atrial fibrillation ([address] CAD (coronary artery disease), CHF (congestive heart failure) ([address] Chronic pain syndrome, Cirrhosis ([facility] Depression, Diabetes mellitus ([facility] [address] [facility] GERD (gastroesophageal reflux disease), Hyperlipidemia, Hypertension, Kidney disease, Thyroid disease, and Vitamin D deficiency. has a past surgical history that includes Coronary Artery Bypass Graft Maze Procedure (1995); Cholecystectomy; and Breast reduction surgery. Restrictions  Restrictions/Precautions: Fall Risk, Up as Tolerated  Position Activity Restriction  Other position/activity restrictions: IV, mora  Subjective   Chart Reviewed: Yes  Response To Previous Treatment: Patient with no complaints from previous session.   Family / Caregiver Present: No  Referring Practitioner: Bessy Gresham MD  Subjective: Pt agrees to bed ex only, declines to sit EOB or get OOB, denies pain  Comments: RN cleared pt for therapy, pt resting in bed on approach and on exit  Pain Screening  Patient Currently in Pain: Denies       Orientation  Overall Orientation Status: Within Normal Limits  Objective   Bed mobility  Comment: unable to assess, pt declines  Transfers  Comment: Unable to assess, pt declines  Ambulation  Ambulation?: No(pt states she does not walk at baseline, prefers to stay in bed where she is comfortable)        Exercises  Quad Sets: 12 x B  Heelslides: 15 x B  Gluteal Sets: 15 x B  Hip Abduction: 15 x B  Hip IR/ER: 15 x B  Ankle Pumps: 20 x B  Diaphragmatic breathing with TA set: 10 x       Diaphragmatic breathing with coordinated BUE flexion/extension: 10 x        AM-PAC Score     AM-PAC Inpatient Mobility without Stair Climbing Raw Score : 7 (12/15/20 1304)  AM-PAC Inpatient without Stair Climbing T-Scale Score : 28.66 (12/15/20 1304)  Mobility Inpatient CMS 0-100% Score: 86.29 (12/15/20 1304)  Mobility Inpatient without Stair CMS G-Code Modifier : CM (12/15/20 1304)       Goals  Short term goals  Time Frame for Short term goals: 12/15  Short term goal 1: Pt will complete bed mobility wtih min A. 12/15 pt declined bed mob  Short term goal 2: Pt will sit to stand wtih SBA. 12/15 pt declined transfers  Short term goal 3: Pt will ambulate x 5' with RW with CGA. 12/15 pt declined  Short term goal 4: Pt will sit in a chair x 60 minutes. 12/15 pt declined to get OOB  Short term goal 5: Pt will participate in B LE exercises to improve functional strength and mobility by 12/11. 12/15 pt performed 15 reps of LE ex, larger mvmts assisted  Patient Goals   Patient goals : none expressed    Plan    Times per week: 2-4x/week  Current Treatment Recommendations: Strengthening, Balance Training, Endurance Training, Functional Mobility Training, Transfer Training, Gait Training, Positioning  Safety Devices  Type of devices:  All fall risk precautions in place, Bed alarm in place, Gait belt, Patient at risk for falls, Left in bed, Nurse notified, Call light within reach  Restraints  Initially in place: No     Therapy Time   Individual Concurrent Group Co-treatment   Time In 1242         Time Out 1310         Minutes [facility],

## 2020-12-15 NOTE — PROGRESS NOTES
Hospitalist Progress Note      PCP: Abdoulaye Cancino MD    Date of Admission: 12/4/2020     Chief Complaint: Bilateral leg edema and weight gain    Hospital course: Pt. Is a 71 yo morbidly obese F who was admitted with anasarca. She has been on a lasix drip.      Subjective: Pt. States the shortness of breath is only present when she is moving. Currently, she denies any chest pain, nausea/vomiting, or abdominal pain. Urine output is improving, on lasix drip. She has been afebrile.  She c/o watery,itchy eyes, with crusting.     Medications:  Reviewed    Infusion Medications    dextrose      furosemide (LASIX) 1mg/ml infusion 10 mg/hr (12/15/20 0233)     Scheduled Medications    amLODIPine  2.5 mg Oral Daily    spironolactone  25 mg Oral BID    metOLazone  10 mg Oral Daily    iron sucrose (VENOFER) iv piggyback 100 mL (Admin over 60 minutes)  200 mg Intravenous Q24H    potassium chloride  20 mEq Oral BID    fluticasone  1 spray Each Nostril Daily    apixaban  5 mg Oral BID    atorvastatin  20 mg Oral Daily    insulin glargine  15 Units Subcutaneous Nightly    levothyroxine  125 mcg Oral Daily    pantoprazole  40 mg Oral Daily    sertraline  125 mg Oral Nightly    sodium chloride flush  10 mL Intravenous 2 times per day    insulin lispro  0-12 Units Subcutaneous TID WC    insulin lispro  0-6 Units Subcutaneous Nightly    tiZANidine  4 mg Oral BID     PRN Meds: magnesium sulfate, tetrahydrozoline, sodium chloride flush, acetaminophen **OR** acetaminophen, polyethylene glycol, promethazine **OR** ondansetron, glucose, dextrose, glucagon (rDNA), dextrose, perflutren lipid microspheres, traMADol, carboxymethylcellulose PF, sodium chloride flush      Intake/Output Summary (Last 24 hours) at 12/15/2020 1038  Last data filed at 12/15/2020 0225  Gross per 24 hour   Intake 1972.67 ml   Output 3000 ml   Net -1027.33 ml       Physical Exam Performed:    BP (!) 136/55   Pulse 65   Temp 98.1 °F (36.7 °C) (Oral)   Resp 18   Ht 5' 5\" (1.651 m)   Wt (!) 322 lb 6.4 oz (146.2 kg)   SpO2 93%   BMI 53.65 kg/m²     General appearance: No apparent distress, appears stated age and cooperative.  Pleasant  HEENT: Pupils equal, round, and reactive to light. Conjunctivae/corneas clear. Neck: Supple, with full range of motion. No jugular venous distention. Trachea midline. Respiratory:  Normal respiratory effort. Clear to auscultation, bilaterally without Wheezes/Rhonchi.    Cardiovascular: Regular rate and rhythm with normal S1/S2 without murmurs, rubs or gallops. Abdomen: edematous,Soft, non-tender, non-distended with normal bowel sounds. Obese abdomen  Musculoskeletal: No clubbing, cyanosis. 3+ lower extremity edema, pitting b/l    Labs:   Recent Labs     12/13/20  1117 12/15/20  0843   WBC 3.0* 3.4*   HGB 7.5* 7.6*   HCT 23.9* 24.3*    147     Recent Labs     12/13/20  1116 12/14/20  1052 12/15/20  0843   * 138 138   K 3.7 3.9 4.1    100 99   CO2 28 28 30   BUN 38* 36* 36*   CREATININE 1.3* 1.3* 1.3*   CALCIUM 9.5 10.0 10.2   PHOS 2.8 2.9 3.1     No results for input(s): AST, ALT, BILIDIR, BILITOT, ALKPHOS in the last 72 hours. No results for input(s): INR in the last 72 hours. No results for input(s): Amina Comment in the last 72 hours. Urinalysis:      Lab Results   Component Value Date    NITRU Negative 12/05/2020    WBCUA 3-5 12/05/2020    BACTERIA 2+ 08/20/2020    RBCUA 5-10 12/05/2020    BLOODU SMALL 12/05/2020    SPECGRAV 1.025 12/05/2020    GLUCOSEU Negative 12/05/2020       Radiology:  CT ABDOMEN PELVIS WO CONTRAST Additional Contrast? None   Final Result   1. Findings of diffuse volume overload including abdominal and pelvic   ascites, anasarca and findings of congestive heart failure in the lower lungs. 2. Hepatic morphologic features of cirrhosis. No gross hepatic lesion   evident. 3. Splenomegaly, typically associated with portal venous hypertension but   nonspecific. 4. Mild right upper quadrant lymph node enlargement is very likely reactive. XR CHEST PORTABLE   Final Result   Vascular congestion. No acute pulmonary disease. Stable enlargement of cardiac silhouette. Assessment/Plan:    Active Hospital Problems    Diagnosis    Acute on chronic diastolic CHF (congestive heart failure) ([facility]) [I50.33]    Morbid obesity ([facility] [E66.01]    Fluid overload [E87.70]    CHF (congestive heart failure) ([facility]) [I50.9]    CAD (coronary artery disease) [I25.10]    Acute kidney injury superimposed on chronic kidney disease ([facility] [N17.9, N18.9]    Essential hypertension [I10]    Type 2 diabetes mellitus, with long-term current use of insulin ([facility]) [E11.9, Z79.4]    Stage 3 chronic kidney disease [N18.30]     Acute on chronic diastolic CHF  Edema also complicated by cirrhosis. Continue lasix drip, spironolactone  Cardiology consulted.    -pt. Has been non compliant previously. There is a prior ejection fraction which was normal about 6 months ago. No need to repeat echo at this time.  -cont. To monitor for improvement, urine output improving today. Acute kidney injury  Creatinine is lower than before, higher than baseline. Might be a component of cardiorenal syndrome. Nephrology consulted  Monitor daily basic metabolic panel    Paroxysmal atrial fibrillation  Heart rate is controlled and stable  On Eliquis for stroke prophylaxis    Allergic conjunctivitis:  -ordered Naphcon A eye drops. Diabetes mellitus type 2  Continue sliding scale insulin. Blood sugars are acceptable.     Chronic normocytic anemia  Required transfusion on admission. Monitor hemoglobin. Iron levels are consistent with iron deficiency. -on Venofer infusions. Dyslipidemia  Continue statin     Morbid obesity  Body mass index is 51.19 kg/m².  Complicating assessment and treatment.  Placing patient at risk for multiple co-morbidities as well as early death and contributing to the patient's presentation. Counseled on weight loss. Discussed with the patient, questions answered       DVT Prophylaxis: on eliquis  Diet: DIET LOW SODIUM 2 GM; 1500 ml  Code Status: Full Code    PT/OT Eval Status:ECF recommended.     Dispo -continue diuresis with IV Lasix, possibly 2-3 days.     Veronica Cruz MD

## 2020-12-15 NOTE — PROGRESS NOTES
Comprehensive Nutrition Assessment    Type and Reason for Visit:  Reassess    Nutrition Recommendations/Plan:   1. Continue 2 gm sodium, 1500 mL FR diet   2. Encourage diet compliance and healthy lifestyle  3. Monitor nutrition adequacy, pertinent labs, bowel habits, wt changes, and clinical progress    Nutrition Assessment:  Follow up: Pt remains nutritionally at risk AEB fair appetite and extended hospital stay. Pt remains on 2 gm Na diet with 1500 mL FR. Currently on IV Lasix. Pt requesting salt at time of visit. Further discussed importance of low sodium diet and fluid restriction. Malnutrition Assessment:  Malnutrition Status:   At risk for malnutrition (Comment)      Estimated Daily Nutrient Needs:  Energy (kcal):  2074-9039 kcal; Weight Used for Energy Requirements:  Current(146 kg)     Protein (g):   g; Weight Used for Protein Requirements:  Ideal(1.5-2.0 g)        Fluid (ml/day):  1500 mL FR; Method Used for Fluid Requirements:         Nutrition Related Findings:  +BS, BM 12/14, Ascites, -19 L per I&Os, +3 pitting BLE edema, +2 generalized edema      Wounds:  (redness to coccyx)       Current Nutrition Therapies:    DIET LOW SODIUM 2 GM; 1500 ml    Anthropometric Measures:  · Height: 5' 5\" (165.1 cm)  · Current Body Weight: 322 lb (146.1 kg)   · Admission Body Weight: 343 lb (155.6 kg)(bed scale)    · Ideal Body Weight: 125 lbs;   · BMI: 53.6  · BMI Categories: Obese Class 3 (BMI 40.0 or greater)       Nutrition Diagnosis:   · Overweight/Obese related to excessive energy intake as evidenced by BMI    Nutrition Interventions:   Food and/or Nutrient Delivery:  Continue Current Diet  Nutrition Education/Counseling:  Education initiated   Coordination of Nutrition Care:  Continue to monitor while inpatient    Goals:  Compliance with 2gm Na diet to prevent further CHF exacerbations       Nutrition Monitoring and Evaluation:   Behavioral-Environmental Outcomes:  Readiness for Change, Beliefs and Attitutes   Food/Nutrient Intake Outcomes:  Food and Nutrient Intake  Physical Signs/Symptoms Outcomes:  Fluid Status or Edema     Discharge Planning:    Continue current diet     Electronically signed by Samantha Sawyer MS, RD, LD on 12/15/20 at 1:43 PM EST    Contact: [phone]

## 2020-12-15 NOTE — PROGRESS NOTES
[facility]  Cardiology  Progress Note    Admission date:  2020    Reason for follow up visit: CHF    HPI/CC: Yani Lung is a 72 y.o. female who was admitted 2020 from [facility] for shortness of breath. Subjective:     Vitals:  Blood pressure (!) 130/52, pulse 67, temperature 97.9 °F (36.6 °C), temperature source Oral, resp. rate 18, height 5' 5\" (1.651 m), weight (!) 322 lb 6.4 oz (146.2 kg), SpO2 91 %.   Temp  Av °F (36.7 °C)  Min: 97.8 °F (36.6 °C)  Max: 98.5 °F (36.9 °C)  Pulse  Av.7  Min: 60  Max: 75  BP  Min: 110/61  Max: 145/67  SpO2  Av.7 %  Min: 90 %  Max: 93 %    24 hour I/O    Intake/Output Summary (Last 24 hours) at 12/15/2020 1604  Last data filed at 12/15/2020 0225  Gross per 24 hour   Intake 1972.67 ml   Output 2700 ml   Net -727.33 ml     Current Facility-Administered Medications   Medication Dose Route Frequency Provider Last Rate Last Admin    ketotifen (ZADITOR) 0.025 % ophthalmic solution 1 drop  1 drop Both Eyes BID Elodia Sinha MD   1 drop at 12/15/20 1400    metOLazone (ZAROXOLYN) tablet 10 mg  10 mg Oral BID Luisito Tapia MD        spironolactone (ALDACTONE) tablet 50 mg  50 mg Oral BID Luisito Tapia MD        magnesium sulfate 1 g in dextrose 5% 100 mL IVPB  1 g Intravenous PRN Eligio Chen MD   Stopped at 20 1748    iron sucrose (VENOFER) 200 mg in sodium chloride 0.9 % 100 mL IVPB  200 mg Intravenous Q24H Eligio Chen MD   Stopped at 20 1900    tetrahydrozoline 0.05 % ophthalmic solution 1 drop  1 drop Both Eyes Q4H PRN Anjum Lu MD   1 drop at 20 1401    potassium chloride (KLOR-CON M) extended release tablet 20 mEq  20 mEq Oral BID Manjula Chinchilla MD   20 mEq at 12/15/20 0918    fluticasone (FLONASE) 50 MCG/ACT nasal spray 1 spray  1 spray Each Nostril Daily [facility], APRN - CNP   1 spray at 12/15/20 0919    apixaban (ELIQUIS) tablet 5 mg  5 mg Oral BID DANIELLE Childress CNP   5 mg at 12/15/20 0919    atorvastatin (LIPITOR) tablet 20 mg  20 mg Oral Daily Tiffany Angi, APRN - CNP   20 mg at 12/15/20 8455    insulin glargine (LANTUS) injection vial 15 Units  15 Units Subcutaneous Nightly Tiffany Angi, APRN - CNP   15 Units at 12/14/20 2224    levothyroxine (SYNTHROID) tablet 125 mcg  125 mcg Oral Daily Tiffany Angi, APRN - CNP   125 mcg at 12/15/20 4751    pantoprazole (PROTONIX) tablet 40 mg  40 mg Oral Daily Tiffany Angi, APRN - CNP   40 mg at 12/15/20 1108    sertraline (ZOLOFT) tablet 125 mg  125 mg Oral Nightly Tiffany Angi, APRN - CNP   125 mg at 12/14/20 2209    sodium chloride flush 0.9 % injection 10 mL  10 mL Intravenous 2 times per day Tiffany Angi, APRN - CNP   10 mL at 12/15/20 0919    sodium chloride flush 0.9 % injection 10 mL  10 mL Intravenous PRN Tiffany Angi, APRN - CNP        acetaminophen (TYLENOL) tablet 650 mg  650 mg Oral Q6H PRN Tiffany Angi, APRN - CNP   650 mg at 12/10/20 2124    Or    acetaminophen (TYLENOL) suppository 650 mg  650 mg Rectal Q6H PRN Tiffany Angi, APRN - CNP        polyethylene glycol (GLYCOLAX) packet 17 g  17 g Oral Daily PRN Tiffany Angi, APRN - CNP        promethazine (PHENERGAN) tablet 12.5 mg  12.5 mg Oral Q6H PRN Tiffany Angi, APRN - CNP        Or    ondansetron (ZOFRAN) injection 4 mg  4 mg Intravenous Q6H PRN Tiffany Angi, APRN - CNP        insulin lispro (HUMALOG) injection vial 0-12 Units  0-12 Units Subcutaneous TID WC Tiffany Angi, APRN - CNP   4 Units at 12/15/20 1237    insulin lispro (HUMALOG) injection vial 0-6 Units  0-6 Units Subcutaneous Nightly Tiffany Angi, APRN - CNP   1 Units at 12/14/20 2225    glucose (GLUTOSE) 40 % oral gel 15 g  15 g Oral PRN Tiffany Angi, APRN - CNP        dextrose 50 % IV solution  12.5 g Intravenous PRN Tiffany Angi, APRN - CNP        glucagon (rDNA) injection 1 mg  1 mg Intramuscular PRN DANIELLE Reynoso - CNP        dextrose 5 % solution  100 mL/hr Intravenous PRN DANIELLE Reynoso - CNP        furosemide (LASIX) 100 mg in dextrose 5 % 100 mL infusion  15 mg/hr Intravenous Continuous Ozzy Merritt MD 15 mL/hr at 12/15/20 1416 15 mg/hr at 12/15/20 1416    perflutren lipid microspheres (DEFINITY) injection 1.65 mg  1.5 mL Intravenous ONCE PRN Cherylyn Scot, APRN - CNP        traMADol (ULTRAM) tablet 25 mg  25 mg Oral Q6H PRN Long Ocampo MD   25 mg at 12/15/20 0702    tiZANidine (ZANAFLEX) tablet 4 mg  4 mg Oral BID Long Ocampo MD   4 mg at 12/15/20 0918    carboxymethylcellulose PF (THERATEARS) 1 % ophthalmic gel 1 drop  1 drop Both Eyes PRN Ale Penaloza MD   1 drop at 12/14/20 2209    sodium chloride flush 0.9 % injection 10 mL  10 mL Intravenous PRN Mary Butt PA-C   10 mL at 12/05/20 0195     Review of Systems   Constitutional: Positive for fatigue. Respiratory: Positive for shortness of breath. Cardiovascular: Positive for leg swelling. Negative for chest pain and palpitations. Neurological: Negative. Objective:     Telemetry monitor: AF    Physical Exam:  Constitutional:  Comfortable and alert, NAD, appears older than stated age, obese  Eyes: PERRL, sclera nonicteric  Neck:  Supple, no masses, no thyroidmegaly, JVD difficult to assess  Skin:  Warm and dry; no rash or lesions  Heart: Regular, normal apex, S1 and S2 normal, no M/G/R  Lungs:  Normal respiratory effort; clear; no wheezing/rhonchi/rales  Abdomen: soft, non tender, + bowel sounds, obese  Extremities:  2+ BLE edema  Neuro: alert and oriented, moves legs and arms equally, normal mood and affect    Data Reviewed:    Echo 12/7/2020:   Limited only f/u for LVEF. Normal left ventricular systolic function with ejection fraction of 55-60%. No regional wall motion abnormalites are seen. Septal bounce due to right ventricular pressure and volume overload. The right ventricle is mildly enlarged. Right ventricular systolic function is moderately reduced .    Moderate tricuspid regurgitation. Definity echo contrast used. Systolic pulmonic artery pressure (SPAP) is estimated at 67 mmHg consistent   with severe pulmonary hypertension (Right atrial pressure of 8 mmHg). The right atrium is moderately dilated. Lab Reviewed:     Renal Profile:  Lab Results   Component Value Date    CREATININE 1.3 12/15/2020    BUN 36 12/15/2020     12/15/2020    K 4.1 12/15/2020    K 4.4 12/04/2020    CL 99 12/15/2020    CO2 30 12/15/2020     CBC:    Lab Results   Component Value Date    WBC 3.4 12/15/2020    RBC 2.96 12/15/2020    HGB 7.6 12/15/2020    HCT 24.3 12/15/2020    MCV 82.1 12/15/2020    RDW 16.9 12/15/2020     12/15/2020     BNP:    Lab Results   Component Value Date    PROBNP 3,574 12/04/2020    PROBNP 1,620 08/20/2020     Fasting Lipid Panel:    Lab Results   Component Value Date    CHOL 105 12/06/2020    HDL 58 12/06/2020    TRIG 54 12/06/2020     Cardiac Enzymes:  CK/MbTroponin  Lab Results   Component Value Date    TROPONINI 0.04 12/05/2020     PT/ INR   Lab Results   Component Value Date    INR 2.03 12/04/2020    INR 1.79 08/20/2020    PROTIME 23.7 12/04/2020    PROTIME 20.9 08/20/2020     PTT No results found for: PTT   Lab Results   Component Value Date    MG 1.70 12/15/2020    No results found for: TSH    All labs and imaging reviewed today    Assessment:  Elevated troponin  Acute on chronic diastolic CHF, right heart failure: remains fluid overloaded, -19L and - 21 lbs (bed scales)   - dry weight felt to be about 220 lbs (322 lbs today)  Atrial fibrillation of unknown duration, appears chronic: rate controlled   - [id number] score > 2 on eliquis  CAD: s/p CABG x3 1995  Pulmonary HTN: severe on echo 12/7/2020  TR: moderate  HTN: stable  HLD  DM  CKD  Hypomagenesemia  Anemia: venofer started 12/14/2020  Obesity  History of CVA    Plan:   1. Aggressive diuresis as renal function and BP allow; currently on lasix gtt 15 mg/hr, metolazone, spironolactone   2.  Ischemic evaluation once diuresed, likely several days  3. Add aspirin for CAD  4. Add beta blocker once more euvolemic  5. Continue eliquis, monitor CBC  6.  Daily weights (standing scale), monitor BMP, I/Os    DANIELLE Lackey-CNP  [facility]  [phone]

## 2020-12-15 NOTE — PROGRESS NOTES
Patient Name: Germain Yip                                                    Primary Physician: Marisa Martinez MD  Admitting Dx: [facility]                 Inpatient Progress Note                                         Plan for today:     Seen and examined in the room   diuresed fair. 4000 ml. Needs atleast another 30 -40 lbs off. Wt trendign down  Inc . Lasix gtt and metolazone doses. Inc. aldactoen dose  D/c norvasc , keep SBP > 120 for adequate diuresis. Assessment / Plan:       Generalized anasarca/massive volume overload. Tells me her weight is 220 pounds dry. Currently is 335 pounds. Started on Lasix drip I will add metolazone. Watch for hypokalemia. She may get alkalotic may need to use some doses of albumin. I will also start the patient on Aldactone for the time being for potassium conservation. Last admission she was down to 324 pounds at discharge.        Acute Kidney Injury on CKD III:      DIONNE likely due to - cardiorenal syndrome, r/o urinary tract infection, also has cirrhosis of the liver with decompensation  Cr on consultation 1.3   Baseline Cr-1.5 with wide fluctuations. from 4/9  She is followed by Dr. Vibha Khan follow-up was 5/19     UA trace blood ,  otherwise bland  Renal Imaging:- 8/20- no hydronephrosis, no sig abnormality  Echo: 11/18-EF normal moderate TR     Hypertension   BP goal inpatient [id number] systolic inpatient  Known to have atrial fibrillation     Cirrhosis of the liver-with portal  Diabetes mellitus on insulin  Hyperuricemia      DM   Morbid obesity. History of A. fib. Patient history of medication noncompliance. Patient is a full code.       Please call with any questions or concerns. [phone]. Joe Asencio        Subjective: Interval hx:   Seen in room without family. Afebrile  Appetite fair. Diuresing . CC:  SOB ,volume overload.      HPI: The patient is a 71-year-old  female with a past medical history of hypertension, hyperlipidemia, diabetes mellitus, morbid obesity, history of congestive heart failure, diastolic heart failure, coronary artery disease, atrial fibrillation, bilateral lower extremity edema previous history of cellulitis, and has had multiple admissions for weight gain and volume overload. She also has [facility] cirrhosis/steatohepatitis. At the nursing home she is on multiple diuretics. She was last admitted on 12/5/2020 with volume overload and weight gain.   She tells me that her good weight is around 220 pounds    PMH:  Past Medical History:   Diagnosis Date    Anxiety     Arthritis     [facility]     CAD (coronary artery disease)     CHF (congestive heart failure) (HCC)     Chronic pain syndrome     Cirrhosis [facility]     Depression     Diabetes mellitus ([facility]     GERD (gastroesophageal reflux disease)     Hyperlipidemia     Hypertension     Kidney disease     Thyroid disease     Vitamin D deficiency          Objective:     Vitals:   Vitals:    12/14/20 2311 12/15/20 0330 12/15/20 0830 12/15/20 1308   BP: (!) 145/67 110/61 (!) 136/55 (!) 117/49   Pulse: 71 73 65 63   Resp: 18 16 18 18   Temp: 98 °F (36.7 °C) 98.5 °F (36.9 °C) 98.1 °F (36.7 °C) 97.9 °F (36.6 °C)   TempSrc: Oral Oral Oral Oral   SpO2: 90% 93% 93% 92%   Weight:  (!) 322 lb 6.4 oz (146.2 kg)     Height:         REVIEW OF SYSTEMS:   POSITIVE ITEMS IN BOLD:  GEN:  fevers, chills, sweats, fatigue and weight loss   EYE: eyelid swelling, no redness  ENT:   nasal congestion, sore mouth and sore throat   Resp:   cough, hemoptysis, pneumonia or dyspnea on exertion   Card: chest pain, chest pressure/discomfort, dyspnea, palpitations,  lower extremity edema   GI: nausea, vomiting, diarrhea, constipation and abdominal pain   :  dysuria, nocturia, urinary incontinence, hesitancy and hematuria   Derm:  rash, skin lesion(s), pruritus and dryness   Neuro:  headaches, dizziness, seizures, gait problems, tremor and weakness   MS:  myalgias, arthralgias, neck pain and back pain     PE:  Gen appearance: Alert, appears stated age and cooperative   Eyes: Eyelids,conjunctiva and pupils look normal   ENT: External inspection of the ears and nose are within normal limits             Oral mucosa  Is moist   Neuro: Grossly no focal neurological deficits, normal sensation, grossly cranial nerves intact   Neck:  +++ JVD, no mass, no thyroid enlargement   Cardio: S1 S2 normal, No added sounds   Resp: normal effort, clear to auscultation     GI:  Soft, non-tender, BS +          No palpable kidney, no renal angle tenderness   MS: No swollen or tender joints, no cyanosis, clubbing   DERM: no rashes, thickening   EDEMA: 2-3+ gen anasarca. Up to abdominal wall hips thighs and legs. Lungs relatively clear.       I/Os:         Intake/Output Summary (Last 24 hours) at 12/15/2020 1401  Last data filed at 12/15/2020 0225  Gross per 24 hour   Intake 1972.67 ml   Output 2700 ml   Net -727.33 ml       Meds:     Scheduled Meds:   ketotifen  1 drop Both Eyes BID    amLODIPine  2.5 mg Oral Daily    spironolactone  25 mg Oral BID    metOLazone  10 mg Oral Daily    iron sucrose (VENOFER) iv piggyback 100 mL (Admin over 60 minutes)  200 mg Intravenous Q24H    potassium chloride  20 mEq Oral BID    fluticasone  1 spray Each Nostril Daily    apixaban  5 mg Oral BID    atorvastatin  20 mg Oral Daily    insulin glargine  15 Units Subcutaneous Nightly    levothyroxine  125 mcg Oral Daily    pantoprazole  40 mg Oral Daily    sertraline  125 mg Oral Nightly    sodium chloride flush  10 mL Intravenous 2 times per day    insulin lispro  0-12 Units Subcutaneous TID     insulin lispro  0-6 Units Subcutaneous Nightly    tiZANidine  4 mg Oral BID     Continuous Infusions:   dextrose      furosemide (LASIX) 1mg/ml infusion 10 mg/hr (12/15/20 0233)     PRN Meds:magnesium sulfate, tetrahydrozoline, sodium chloride flush, acetaminophen **OR** acetaminophen, polyethylene glycol, promethazine **OR** ondansetron, glucose, dextrose, glucagon (rDNA), dextrose, perflutren lipid microspheres, traMADol, carboxymethylcellulose PF, sodium chloride flush    Diet:  DIET LOW SODIUM 2 GM; 1500 ml    CBC:   Recent Labs     12/13/20  1117 12/15/20  0843   WBC 3.0* 3.4*   HGB 7.5* 7.6*   HCT 23.9* 24.3*    147     BMP:    Recent Labs     12/13/20  1116 12/14/20  1052 12/15/20  0843   * 138 138   K 3.7 3.9 4.1    100 99   CO2 28 28 30   BUN 38* 36* 36*   CREATININE 1.3* 1.3* 1.3*   GLUCOSE 192* 171* 160*   MG 1.70* 1.40* 1.70*   PHOS 2.8 2.9 3.1     ABGs: No results found for: JHON YUSUF, [facility]. [facility]. Off: [phone]  Cell: [phone].  ( until 5 pm)

## 2020-12-15 NOTE — PLAN OF CARE
Problem: Falls - Risk of:  Goal: Will remain free from falls  Description: Will remain free from falls  Outcome: Ongoing  Goal: Absence of physical injury  Description: Absence of physical injury  Outcome: Ongoing     Problem: Skin Integrity:  Goal: Will show no infection signs and symptoms  Description: Will show no infection signs and symptoms  Outcome: Ongoing  Goal: Absence of new skin breakdown  Description: Absence of new skin breakdown  Outcome: Ongoing     Problem: Pain:  Goal: Pain level will decrease  Description: Pain level will decrease  Outcome: Ongoing  Goal: Control of acute pain  Description: Control of acute pain  Outcome: Ongoing  Goal: Control of chronic pain  Description: Control of chronic pain  Outcome: Ongoing     Problem: OXYGENATION/RESPIRATORY FUNCTION  Goal: Patient will maintain patent airway  Outcome: Ongoing  Goal: Patient will achieve/maintain normal respiratory rate/effort  Description: Respiratory rate and effort will be within normal limits for the patient  Outcome: Ongoing

## 2020-12-15 NOTE — CARE COORDINATION
Chart reviewed, day #10. Pt still on lasix drip, no discharge today. Writer spoke with [facility], they are following via Epic, can accept anytime after rapid Covid negative.   ELTON Araiza-RN

## 2020-12-16 LAB
ALBUMIN SERPL-MCNC: 3.6 G/DL (ref 3.4–5)
ANION GAP SERPL CALCULATED.3IONS-SCNC: 10 MMOL/L (ref 3–16)
BUN BLDV-MCNC: 36 MG/DL (ref 7–20)
CALCIUM SERPL-MCNC: 10.4 MG/DL (ref 8.3–10.6)
CHLORIDE BLD-SCNC: 96 MMOL/L (ref 99–110)
CO2: 32 MMOL/L (ref 21–32)
CREAT SERPL-MCNC: 1.4 MG/DL (ref 0.6–1.2)
GFR AFRICAN AMERICAN: 45
GFR NON-AFRICAN AMERICAN: 37
GLUCOSE BLD-MCNC: 130 MG/DL (ref 70–99)
GLUCOSE BLD-MCNC: 144 MG/DL (ref 70–99)
GLUCOSE BLD-MCNC: 156 MG/DL (ref 70–99)
GLUCOSE BLD-MCNC: 219 MG/DL (ref 70–99)
GLUCOSE BLD-MCNC: 252 MG/DL (ref 70–99)
MAGNESIUM: 1.7 MG/DL (ref 1.8–2.4)
PERFORMED ON: ABNORMAL
PHOSPHORUS: 3.2 MG/DL (ref 2.5–4.9)
POTASSIUM SERPL-SCNC: 4 MMOL/L (ref 3.5–5.1)
SODIUM BLD-SCNC: 138 MMOL/L (ref 136–145)

## 2020-12-16 PROCEDURE — 1200000000 HC SEMI PRIVATE

## 2020-12-16 PROCEDURE — 6370000000 HC RX 637 (ALT 250 FOR IP): Performed by: INTERNAL MEDICINE

## 2020-12-16 PROCEDURE — 2580000003 HC RX 258: Performed by: INTERNAL MEDICINE

## 2020-12-16 PROCEDURE — 6360000002 HC RX W HCPCS: Performed by: INTERNAL MEDICINE

## 2020-12-16 PROCEDURE — 97535 SELF CARE MNGMENT TRAINING: CPT

## 2020-12-16 PROCEDURE — 99233 SBSQ HOSP IP/OBS HIGH 50: CPT | Performed by: NURSE PRACTITIONER

## 2020-12-16 PROCEDURE — 2580000003 HC RX 258: Performed by: NURSE PRACTITIONER

## 2020-12-16 PROCEDURE — 6370000000 HC RX 637 (ALT 250 FOR IP): Performed by: NURSE PRACTITIONER

## 2020-12-16 PROCEDURE — 97530 THERAPEUTIC ACTIVITIES: CPT

## 2020-12-16 PROCEDURE — 80069 RENAL FUNCTION PANEL: CPT

## 2020-12-16 PROCEDURE — 36415 COLL VENOUS BLD VENIPUNCTURE: CPT

## 2020-12-16 PROCEDURE — 83735 ASSAY OF MAGNESIUM: CPT

## 2020-12-16 RX ORDER — ASPIRIN 81 MG/1
81 TABLET ORAL DAILY
Status: DISCONTINUED | OUTPATIENT
Start: 2020-12-16 | End: 2020-12-24 | Stop reason: HOSPADM

## 2020-12-16 RX ADMIN — SERTRALINE HYDROCHLORIDE 125 MG: 50 TABLET ORAL at 21:20

## 2020-12-16 RX ADMIN — POTASSIUM CHLORIDE 20 MEQ: 20 TABLET, EXTENDED RELEASE ORAL at 08:30

## 2020-12-16 RX ADMIN — ATORVASTATIN CALCIUM 20 MG: 10 TABLET, FILM COATED ORAL at 08:30

## 2020-12-16 RX ADMIN — POTASSIUM CHLORIDE 20 MEQ: 20 TABLET, EXTENDED RELEASE ORAL at 21:15

## 2020-12-16 RX ADMIN — INSULIN LISPRO 6 UNITS: 100 INJECTION, SOLUTION INTRAVENOUS; SUBCUTANEOUS at 13:00

## 2020-12-16 RX ADMIN — FUROSEMIDE 15 MG/HR: 10 INJECTION, SOLUTION INTRAMUSCULAR; INTRAVENOUS at 22:24

## 2020-12-16 RX ADMIN — SPIRONOLACTONE 50 MG: 25 TABLET ORAL at 08:30

## 2020-12-16 RX ADMIN — KETOTIFEN FUMARATE 1 DROP: 0.35 SOLUTION/ DROPS OPHTHALMIC at 21:16

## 2020-12-16 RX ADMIN — APIXABAN 5 MG: 5 TABLET, FILM COATED ORAL at 08:30

## 2020-12-16 RX ADMIN — LEVOTHYROXINE SODIUM 125 MCG: 0.12 TABLET ORAL at 06:54

## 2020-12-16 RX ADMIN — METOLAZONE 10 MG: 2.5 TABLET ORAL at 08:30

## 2020-12-16 RX ADMIN — INSULIN GLARGINE 15 UNITS: 100 INJECTION, SOLUTION SUBCUTANEOUS at 21:16

## 2020-12-16 RX ADMIN — TIZANIDINE 4 MG: 4 TABLET ORAL at 21:14

## 2020-12-16 RX ADMIN — SODIUM CHLORIDE, PRESERVATIVE FREE 10 ML: 5 INJECTION INTRAVENOUS at 21:16

## 2020-12-16 RX ADMIN — INSULIN LISPRO 2 UNITS: 100 INJECTION, SOLUTION INTRAVENOUS; SUBCUTANEOUS at 08:31

## 2020-12-16 RX ADMIN — INSULIN LISPRO 1 UNITS: 100 INJECTION, SOLUTION INTRAVENOUS; SUBCUTANEOUS at 21:16

## 2020-12-16 RX ADMIN — KETOTIFEN FUMARATE 1 DROP: 0.35 SOLUTION/ DROPS OPHTHALMIC at 08:30

## 2020-12-16 RX ADMIN — IRON SUCROSE 200 MG: 20 INJECTION, SOLUTION INTRAVENOUS at 15:12

## 2020-12-16 RX ADMIN — FLUTICASONE PROPIONATE 1 SPRAY: 50 SPRAY, METERED NASAL at 08:31

## 2020-12-16 RX ADMIN — SPIRONOLACTONE 50 MG: 25 TABLET ORAL at 18:30

## 2020-12-16 RX ADMIN — SODIUM CHLORIDE, PRESERVATIVE FREE 10 ML: 5 INJECTION INTRAVENOUS at 08:30

## 2020-12-16 RX ADMIN — METOLAZONE 10 MG: 2.5 TABLET ORAL at 21:15

## 2020-12-16 RX ADMIN — APIXABAN 5 MG: 5 TABLET, FILM COATED ORAL at 21:15

## 2020-12-16 RX ADMIN — INSULIN LISPRO 4 UNITS: 100 INJECTION, SOLUTION INTRAVENOUS; SUBCUTANEOUS at 18:31

## 2020-12-16 RX ADMIN — PANTOPRAZOLE SODIUM 40 MG: 40 TABLET, DELAYED RELEASE ORAL at 08:30

## 2020-12-16 RX ADMIN — FUROSEMIDE 15 MG/HR: 10 INJECTION, SOLUTION INTRAMUSCULAR; INTRAVENOUS at 16:19

## 2020-12-16 RX ADMIN — TIZANIDINE 4 MG: 4 TABLET ORAL at 08:30

## 2020-12-16 ASSESSMENT — ENCOUNTER SYMPTOMS: SHORTNESS OF BREATH: 1

## 2020-12-16 ASSESSMENT — PAIN SCALES - GENERAL
PAINLEVEL_OUTOF10: 0
PAINLEVEL_OUTOF10: 0

## 2020-12-16 NOTE — PROGRESS NOTES
[address]  Cardiology  Progress Note    Admission date:  2020    Reason for follow up visit: CHF    HPI/CC: Lenka Pereyra is a 72 y.o. female who was admitted 2020 from [facility] for shortness of breath. Echo showed EF 55%, severe pulmonary HTN. Treated for CHF. Rhythm has been sinus. Subjective: Denies chest pain, shortness of breath, palpitations and dizziness. Vitals:  Blood pressure (!) 147/57, pulse 79, temperature 97.9 °F (36.6 °C), temperature source Oral, resp. rate 16, height 5' 5\" (1.651 m), weight (!) 315 lb 9.6 oz (143.2 kg), SpO2 93 %.   Temp  Av.2 °F (36.8 °C)  Min: 97.9 °F (36.6 °C)  Max: 98.5 °F (36.9 °C)  Pulse  Av.3  Min: 65  Max: 87  BP  Min: 117/76  Max: 147/57  SpO2  Av.3 %  Min: 91 %  Max: 94 %    24 hour I/O    Intake/Output Summary (Last 24 hours) at 2020 1511  Last data filed at 2020 1204  Gross per 24 hour   Intake 720 ml   Output 5425 ml   Net -4705 ml     Current Facility-Administered Medications   Medication Dose Route Frequency Provider Last Rate Last Admin    ketotifen (ZADITOR) 0.025 % ophthalmic solution 1 drop  1 drop Both Eyes BID Sintia Meza MD   1 drop at 20 0830    metOLazone (ZAROXOLYN) tablet 10 mg  10 mg Oral BID Samuel Bunn MD   10 mg at 20 0830    spironolactone (ALDACTONE) tablet 50 mg  50 mg Oral BID Samuel Bunn MD   50 mg at 20 0830    magnesium sulfate 1 g in dextrose 5% 100 mL IVPB  1 g Intravenous PRN Elvie Crain MD   Stopped at 20 1748    iron sucrose (VENOFER) 200 mg in sodium chloride 0.9 % 100 mL IVPB  200 mg Intravenous Q24H Elvie Crain MD   Stopped at 12/15/20 1823    tetrahydrozoline 0.05 % ophthalmic solution 1 drop  1 drop Both Eyes Q4H PRN Mendoza Mackay MD   1 drop at 20 1401    potassium chloride (KLOR-CON M) extended release tablet 20 mEq  20 mEq Oral BID Joey Bentley MD   20 mEq at 20 0830    fluticasone (FLONASE) 50 MCG/ACT DANIELLE Quiroz - CNP        glucagon (rDNA) injection 1 mg  1 mg Intramuscular PRN DANIELLE Beck - CNP        dextrose 5 % solution  100 mL/hr Intravenous PRN DANIELLE Beck CNP        furosemide (LASIX) 100 mg in dextrose 5 % 100 mL infusion  15 mg/hr Intravenous Continuous Krys Gabriel MD 15 mL/hr at 12/15/20 2212 15 mg/hr at 12/15/20 2212    perflutren lipid microspheres (DEFINITY) injection 1.65 mg  1.5 mL Intravenous ONCE PRN DANIELLE Beck - CNP        traMADol (ULTRAM) tablet 25 mg  25 mg Oral Q6H PRN Lizz Tyler MD   25 mg at 12/15/20 0702    tiZANidine (ZANAFLEX) tablet 4 mg  4 mg Oral BID Lizz Tyler MD   4 mg at 12/16/20 0830    carboxymethylcellulose PF (THERATEARS) 1 % ophthalmic gel 1 drop  1 drop Both Eyes PRN Adilene Joel MD   1 drop at 12/14/20 2209    sodium chloride flush 0.9 % injection 10 mL  10 mL Intravenous PRN Scott Coleman PA-C   10 mL at 12/05/20 6973     Review of Systems   Constitutional: Positive for fatigue. Respiratory: Positive for shortness of breath. Cardiovascular: Positive for leg swelling. Negative for chest pain and palpitations. Neurological: Negative. Objective:     Telemetry monitor: AF    Physical Exam:  Constitutional:  Comfortable and alert, NAD, appears older than stated age, obese  Eyes: PERRL, sclera nonicteric  Neck:  Supple, no masses, no thyroidmegaly, JVD difficult to assess  Skin:  Warm and dry; no rash or lesions  Heart: Regular, normal apex, S1 and S2 normal, no M/G/R  Lungs:  Normal respiratory effort; clear; no wheezing/rhonchi/rales  Abdomen: soft, non tender, + bowel sounds, obese  Extremities:  2+ BLE edema  Neuro: alert and oriented, moves legs and arms equally, normal mood and affect    Data Reviewed:    Echo 12/7/2020:   Limited only f/u for LVEF. Normal left ventricular systolic function with ejection fraction of 55-60%. No regional wall motion abnormalites are seen.    Septal bounce due to 12/14/2020  Obesity  History of CVA    Plan:   1. Aggressive diuresis as renal function and BP allow; good progress thus far but remains fluid overloaded  2. Ischemic evaluation once diuresed, likely several days  3. Consider repeat echo once more euvolemic  4. Continue aspirin, statin for CAD  5. Add beta blocker once more euvolemic  6. Continue eliquis, monitor CBC  7.  Daily weights (standing scale), monitor BMP, I/Os    AntonyDANIELLE Godwin-CNP  [employer]  [phone]

## 2020-12-16 NOTE — PROGRESS NOTES
Occupational Therapy  Facility/Department: [place] [address] [facility]  Daily Treatment Note  NAME: Ana Patel  : 1950  MRN: [id number]    Date of Service: 2020    Discharge Recommendations:  ECF with OT      Ana Patel scored a 1424 on the AM-PAC ADL Inpatient form. At this time, no further acute  OT is recommended upon discharge due to functioning close to baseline. Recommend patient returns to prior setting with prior services. Assessment   Performance deficits / Impairments: Decreased functional mobility ; Decreased ADL status; Decreased safe awareness;Decreased cognition;Decreased endurance;Decreased balance  Assessment: Pt continues to verbalize limited motivation for improve OOB activity. Pt completed stand pivot to bedside commode with supervision. Pt reports \"feelings of content\" with level of activity. No additional acute OT is indicted at this time. Pt safe to d/c back to LTC. May benefit from OT at LTC if pt is agreeable. Prognosis: Fair;Guarded  OT Education: OT Role;Plan of Care;Equipment;Home Exercise Program;Energy Conservation  Patient Education: disease specific ed:  OT goals and Tx plan, d/c from OT- pt in agreement  REQUIRES OT FOLLOW UP: No  Activity Tolerance  Activity Tolerance: Patient Tolerated treatment well  Activity Tolerance: Pt tolerated stand pivot transfer to bedside commode; per report from pt, pt only transfers to commode at baseline and does not desire to increase level of activity  Safety Devices  Safety Devices in place: Not Applicable(pt on bedside commode; call light in reach; PCA aware)         Patient Diagnosis(es): The primary encounter diagnosis was Acute kidney injury superimposed on chronic kidney disease ([facility] [address] Diagnoses of Anasarca, Liver cirrhosis secondary to LARRY (nonalcoholic steatohepatitis) ([address] Cirrhosis of liver with ascites, unspecified hepatic cirrhosis type ([address] and Symptomatic anemia were also pertinent to this visit.       has a past medical history of Anxiety, Arthritis, Atrial fibrillation ([address] CAD (coronary artery disease), CHF (congestive heart failure) ([address] Chronic pain syndrome, Cirrhosis ([address] Depression, Diabetes mellitus ([facility] [address] GERD (gastroesophageal reflux disease), Hyperlipidemia, Hypertension, Kidney disease, Thyroid disease, and Vitamin D deficiency. has a past surgical history that includes Coronary Artery Bypass Graft Maze Procedure (1995); Cholecystectomy; and Breast reduction surgery. Restrictions  Restrictions/Precautions  Restrictions/Precautions: Fall Risk, Up as Tolerated  Position Activity Restriction  Other position/activity restrictions: IV, mora  Subjective   General  Chart Reviewed: Yes  Patient assessed for rehabilitation services?: Yes  Family / Caregiver Present: No  Referring Practitioner: PRATIMA Monroe  Diagnosis: fluid overload  Subjective  Subjective: Pt resting in bed, requesting to transfer to bedside commode.   General Comment  Comments: RN approved therapy  Vital Signs  Patient Currently in Pain: Denies   Orientation     Objective    ADL  Feeding: Independent  Grooming: Independent  Toileting: Dependent/Total  Additional Comments: Pt appears to be at baseline for ADLs; Pt required assistance at LTC for all ADLs        Balance  Standing Balance: Stand by assistance(stand pivot to bedside commode)  Standing Balance  Time: 30-45 seconds  Activity: stand pivot to bedside commode  Functional Mobility  Functional Mobility Comments: non-ambulatory at baseline  Toilet Transfers  Toilet - Technique: Stand pivot  Equipment Used: Extra wide bedside commode  Toilet Transfer: Stand by assistance  Bed mobility  Supine to Sit: Stand by assistance  Sit to Supine: (pt on bedside commode at end of session)  Scooting: Minimal assistance  Transfers  Stand Pivot Transfers: Supervision  Sit to stand: Supervision  Stand to sit: Supervision                       Cognition  Overall Cognitive Status: Exceptions  Following Commands:  Follows one step commands consistently  Attention Span: Attends with cues to redirect  Safety Judgement: Decreased awareness of need for safety  Problem Solving: Assistance required to correct errors made;Assistance required to identify errors made;Decreased awareness of errors  Insights: Decreased awareness of deficits                                         Plan   Plan  Times per week: d/c from OT  Current Treatment Recommendations: Self-Care / ADL, Safety Education & Training, Endurance Training, Functional Mobility Training, Balance Training, Patient/Caregiver Education & Training, Strengthening    AM-PAC Score        AM-[facility] Inpatient Daily Activity Raw Score: 14 (12/14/20 1109)  AM-PAC Inpatient ADL T-Scale Score : 33.39 (12/14/20 1109)  ADL Inpatient CMS 0-100% Score: 59.67 (12/14/20 1109)  ADL Inpatient CMS G-Code Modifier : CK (12/14/20 1109)    Goals  Short term goals  Time Frame for Short term goals: 1 week (12/16) unless noted  Short term goal 1: Perform functional transfer with mod A and SW- GOAL MET 12/16  Short term goal 2: Perform UE exer 15x each to improve endurance- GOAL MET 12/16  Short term goal 3: Perform 2 UE ADL tasks with supervision by 12/14- GOAL MET 12/16  Patient Goals   Patient goals : Pt did not indicate       Therapy Time   Individual Concurrent Group Co-treatment   Time In 8966         Time Out 1150         Minutes 26         Timed Code Treatment Minutes: [address], OTR/L

## 2020-12-16 NOTE — PROGRESS NOTES
Patient Name: Magdalena Smith                                                    Primary Physician: Sue Huff MD  Admitting Dx: [facility]                 Inpatient Progress Note                                         Plan for today:     Seen and examined in the room  Wt trend 345--> 322--> 315 lbs   diuresed fair. 4100 ml. Needs atleast another 30  lbs off. Wt trendign down  continue Lasix gtt and metolazone doses. will need to stay here till next week. Inc. aldactoen dose    creat 1.3--> 1.4 , expect it to rise with aggressive diuresis. D/c norvasc , keep SBP > 120 for adequate diuresis. Assessment / Plan:       Generalized anasarca/massive volume overload. Tells me her weight is 220 pounds dry. Currently is 335 pounds. Started on Lasix drip I will add metolazone. Watch for hypokalemia. She may get alkalotic may need to use some doses of albumin. I will also start the patient on Aldactone for the time being for potassium conservation. Last admission she was down to 324 pounds at discharge.        Acute Kidney Injury on CKD III:      DIONNE likely due to - cardiorenal syndrome, r/o urinary tract infection, also has cirrhosis of the liver with decompensation  Cr on consultation 1.3   Baseline Cr-1.5 with wide fluctuations. from 4/9  She is followed by Dr. Prateek Sarabia follow-up was 5/19     UA trace blood ,  otherwise bland  Renal Imaging:- 8/20- no hydronephrosis, no sig abnormality  Echo: 11/18-EF normal moderate TR     Hypertension   BP goal inpatient [id number] systolic inpatient  Known to have atrial fibrillation     Cirrhosis of the liver-with portal  Diabetes mellitus on insulin  Hyperuricemia      DM   Morbid obesity. History of A. fib. Patient history of medication noncompliance. Patient is a full code.       Please call with any questions or concerns. [phone]. Joe Asencio        Subjective: Interval hx:   Seen in room without family. Afebrile  Appetite fair. Diuresing . CC:  SOB ,volume overload. HPI: The patient is a 64-year-old  female with a past medical history of hypertension, hyperlipidemia, diabetes mellitus, morbid obesity, history of congestive heart failure, diastolic heart failure, coronary artery disease, atrial fibrillation, bilateral lower extremity edema previous history of cellulitis, and has had multiple admissions for weight gain and volume overload. She also has Illa Neigh cirrhosis/steatohepatitis. At the nursing home she is on multiple diuretics. She was last admitted on 12/5/2020 with volume overload and weight gain.   She tells me that her good weight is around 220 pounds    PMH:  Past Medical History:   Diagnosis Date    Anxiety     Arthritis     Atrial fibrillation (HCC)     CAD (coronary artery disease)     CHF (congestive heart failure) (HCC)     Chronic pain syndrome     Cir[facility]osis ([facility] [address]     Depression     Diabetes mellitus ([facility] [address]     GERD (gastroesophageal reflux disease)     Hyperlipidemia     Hypertension     Kidney disease     Thyroid disease     Vitamin D deficiency          Objective:     Vitals:   Vitals:    12/16/20 0404 12/16/20 0406 12/16/20 0827 12/16/20 1203   BP: 117/76  135/67 (!) 147/57   Pulse: 69  65 79   Resp: 16  16 16   Temp: 98.4 °F (36.9 °C)  98.3 °F (36.8 °C) 97.9 °F (36.6 °C)   TempSrc: Oral  Oral Oral   SpO2: 92%  93% 93%   Weight:  (!) 315 lb 9.6 oz (143.2 kg)     Height:         REVIEW OF SYSTEMS:   POSITIVE ITEMS IN BOLD:  GEN:  fevers, chills, sweats, fatigue and weight loss   EYE: eyelid swelling, no redness  ENT:   nasal congestion, sore mouth and sore throat   Resp:   cough, hemoptysis, pneumonia or dyspnea on exertion   Card: chest pain, chest pressure/discomfort, dyspnea, palpitations,  lower extremity edema   GI: nausea, vomiting, diarrhea, constipation and abdominal pain   :  dysuria, nocturia, urinary incontinence, hesitancy and hematuria   Derm: rash, skin lesion(s), pruritus and dryness   Neuro:  headaches, dizziness, seizures, gait problems, tremor and weakness   MS:  myalgias, arthralgias, neck pain and back pain     PE:  Gen appearance: Alert, appears stated age and cooperative   Eyes: Eyelids,conjunctiva and pupils look normal   ENT: External inspection of the ears and nose are within normal limits             Oral mucosa  Is moist   Neuro: Grossly no focal neurological deficits, normal sensation, grossly cranial nerves intact   Neck:  +++ JVD, no mass, no thyroid enlargement   Cardio: S1 S2 normal, No added sounds   Resp: normal effort, clear to auscultation     GI:  Soft, non-tender, BS +          No palpable kidney, no renal angle tenderness   MS: No swollen or tender joints, no cyanosis, clubbing   DERM: no rashes, thickening   EDEMA: 2-3+ gen anasarca. Up to abdominal wall hips thighs and legs. Lungs relatively clear.       I/Os:         Intake/Output Summary (Last 24 hours) at 12/16/2020 1552  Last data filed at 12/16/2020 1204  Gross per 24 hour   Intake 720 ml   Output 5425 ml   Net -4705 ml       Meds:     Scheduled Meds:   aspirin  81 mg Oral Daily    ketotifen  1 drop Both Eyes BID    metOLazone  10 mg Oral BID    spironolactone  50 mg Oral BID    iron sucrose (VENOFER) iv piggyback 100 mL (Admin over 60 minutes)  200 mg Intravenous Q24H    potassium chloride  20 mEq Oral BID    fluticasone  1 spray Each Nostril Daily    apixaban  5 mg Oral BID    atorvastatin  20 mg Oral Daily    insulin glargine  15 Units Subcutaneous Nightly    levothyroxine  125 mcg Oral Daily    pantoprazole  40 mg Oral Daily    sertraline  125 mg Oral Nightly    sodium chloride flush  10 mL Intravenous 2 times per day    insulin lispro  0-12 Units Subcutaneous TID WC    insulin lispro  0-6 Units Subcutaneous Nightly    tiZANidine  4 mg Oral BID     Continuous Infusions:   dextrose      furosemide (LASIX) 1mg/ml infusion 15 mg/hr (12/15/20 2212)

## 2020-12-16 NOTE — PROGRESS NOTES
Hospitalist Progress Note      PCP: Katie Roldan MD    Date of Admission: 12/4/2020       Chief Complaint: Bilateral leg edema and weight gain    Hospital course: Admitted with edema. Bolus Lasix did not work. Currently on Lasix drip. Also known to have underlying cirrhosis. Seen by cardiology and nephrology. Lasix drip increased. Norvasc stopped. Feels slightly better than a couple days ago. No chest pain.   Has shortness of breath with exertion      Subjective: No chest pain, no shortness of breath at rest, no nausea or vomiting at rest.  Mild shortness of breath with exertion.       Medications:  Reviewed    Infusion Medications    dextrose      furosemide (LASIX) 1mg/ml infusion 15 mg/hr (12/15/20 2212)     Scheduled Medications    ketotifen  1 drop Both Eyes BID    metOLazone  10 mg Oral BID    spironolactone  50 mg Oral BID    iron sucrose (VENOFER) iv piggyback 100 mL (Admin over 60 minutes)  200 mg Intravenous Q24H    potassium chloride  20 mEq Oral BID    fluticasone  1 spray Each Nostril Daily    apixaban  5 mg Oral BID    atorvastatin  20 mg Oral Daily    insulin glargine  15 Units Subcutaneous Nightly    levothyroxine  125 mcg Oral Daily    pantoprazole  40 mg Oral Daily    sertraline  125 mg Oral Nightly    sodium chloride flush  10 mL Intravenous 2 times per day    insulin lispro  0-12 Units Subcutaneous TID WC    insulin lispro  0-6 Units Subcutaneous Nightly    tiZANidine  4 mg Oral BID     PRN Meds: magnesium sulfate, tetrahydrozoline, sodium chloride flush, acetaminophen **OR** acetaminophen, polyethylene glycol, promethazine **OR** ondansetron, glucose, dextrose, glucagon (rDNA), dextrose, perflutren lipid microspheres, traMADol, carboxymethylcellulose PF, sodium chloride flush      Intake/Output Summary (Last 24 hours) at 12/16/2020 1101  Last data filed at 12/16/2020 0406  Gross per 24 hour   Intake 360 ml   Output 4100 ml   Net -3740 ml       Physical Exam Performed:    /67   Pulse 65   Temp 98.3 °F (36.8 °C) (Oral)   Resp 16   Ht 5' 5\" (1.651 m)   Wt (!) 315 lb 9.6 oz (143.2 kg)   SpO2 93%   BMI 52.52 kg/m²     General appearance: No apparent distress, appears stated age and cooperative.  Pleasant  HEENT: Pupils equal, round, and reactive to light. Conjunctivae/corneas clear. Neck: Supple, with full range of motion. No jugular venous distention. Trachea midline. Respiratory:  Normal respiratory effort. Clear to auscultation, bilaterally without Wheezes/Rhonchi.    Cardiovascular: Regular rate and rhythm with normal S1/S2 without murmurs, rubs or gallops. Abdomen: Soft, non-tender, non-distended with normal bowel sounds. Obese abdomen  Musculoskeletal: No clubbing, cyanosis. 3+ lower extremity edema. Pitting. Similar to a couple days ago. Skin: Skin color, texture, turgor normal.  No rashes or lesions. Neurologic:  Neurovascularly intact without any focal sensory/motor deficits. Cranial nerves: II-XII intact, grossly non-focal.  Psychiatric: Alert and oriented, thought content appropriate, normal insight  Capillary Refill: Brisk,< 3 seconds   Peripheral Pulses: +2 palpable, equal bilaterally     Labs:   Recent Labs     12/13/20  1117 12/15/20  0843   WBC 3.0* 3.4*   HGB 7.5* 7.6*   HCT 23.9* 24.3*    147     Recent Labs     12/14/20  1052 12/15/20  0843 12/16/20  0813    138 138   K 3.9 4.1 4.0    99 96*   CO2 28 30 32   BUN 36* 36* 36*   CREATININE 1.3* 1.3* 1.4*   CALCIUM 10.0 10.2 10.4   PHOS 2.9 3.1 3.2     No results for input(s): AST, ALT, BILIDIR, BILITOT, ALKPHOS in the last 72 hours. No results for input(s): INR in the last 72 hours. No results for input(s): Reyne Azeri in the last 72 hours.     Urinalysis:      Lab Results   Component Value Date    NITRU Negative 12/05/2020    WBCUA 3-5 12/05/2020    BACTERIA 2+ 08/20/2020    RBCUA 5-10 12/05/2020    BLOODU SMALL 12/05/2020    SPECGRAV 1.025 12/05/2020 GLUCOSEU Negative 12/05/2020       Radiology:  CT ABDOMEN PELVIS WO CONTRAST Additional Contrast? None   Final Result   1. Findings of diffuse volume overload including abdominal and pelvic   ascites, anasarca and findings of congestive heart failure in the lower lungs. 2. Hepatic morphologic features of cirrhosis. No gross hepatic lesion   evident. 3. Splenomegaly, typically associated with portal venous hypertension but   nonspecific. 4. Mild right upper quadrant lymph node enlargement is very likely reactive. XR CHEST PORTABLE   Final Result   Vascular congestion. No acute pulmonary disease. Stable enlargement of cardiac silhouette. Assessment/Plan:    Active Hospital Problems    Diagnosis    Acute on chronic diastolic CHF (congestive heart failure) ([facility]) [I50.33]    Morbid obesity ([facility] [address] [E66.01]    Fluid overload [E87.70]    CHF (congestive heart failure) ([facility]) [I50.9]    CAD (coronary artery disease) [I25.10]    Acute kidney injury superimposed on chronic kidney disease ([facility] [address] [N17.9, N18.9]    Essential hypertension [I10]    Type 2 diabetes mellitus, with long-term current use of insulin ([facility]) [E11.9, Z79.4]    Stage 3 chronic kidney disease [N18.30]     PLAN:        Acute on chronic diastolic CHF  Edema also complicated by cirrhosis. Continue furosemide drip and spironolactone  Off Norvasc  Furosemide drip rate increased from before. Cardiology consulted. No significant improvement since admission. Patient admits to past noncompliance. Does not want to go to bathroom often and either skips or refuses diuretics frequently. Evaluated by cardiology. Likely to require ischemic evaluation once stable. Possibly as outpatient. Acute kidney injury  Creatinine has stabilized at 1.3-1.4. There might be a component of cardiorenal syndrome. Nephrology consulted.   Input appreciated  Monitoring daily basic metabolic panel    Paroxysmal atrial fibrillation  Heart rate is controlled and stable  On Eliquis for stroke prophylaxis    Diabetes mellitus type 2  Continue sliding scale insulin. Blood sugars are acceptable.     Chronic normocytic anemia  Iron studies are consistent with iron deficiency. Will receive a total of 1 g IV Venofer infusion. No signs of active blood loss, but patient is on anticoagulation. Dyslipidemia  Continue statin     Morbid obesity  Body mass index is 43.91 kg/m².  Complicating assessment and treatment. Placing patient at risk for multiple co-morbidities as well as early death and contributing to the patient's presentation. Counseled on weight loss. Noted BMI is probably inaccurate due to extensive edema. However, patient is undoubtedly overweight. Discussed with the patient, questions answered         DVT Prophylaxis: on eliquis  Diet: DIET LOW SODIUM 2 GM; 1500 ml  Code Status: Full Code    PT/OT Eval Status: ordered 12/9 and rec ecf with pt/ot     Dispo -unclear date of discharge due to high residual fluid overload.     Heather Huerta MD

## 2020-12-17 LAB
ALBUMIN SERPL-MCNC: 3.5 G/DL (ref 3.4–5)
ANION GAP SERPL CALCULATED.3IONS-SCNC: 9 MMOL/L (ref 3–16)
BASOPHILS ABSOLUTE: 0 K/UL (ref 0–0.2)
BASOPHILS RELATIVE PERCENT: 0.7 %
BUN BLDV-MCNC: 36 MG/DL (ref 7–20)
CALCIUM SERPL-MCNC: 10.8 MG/DL (ref 8.3–10.6)
CHLORIDE BLD-SCNC: 94 MMOL/L (ref 99–110)
CO2: 34 MMOL/L (ref 21–32)
CREAT SERPL-MCNC: 1.6 MG/DL (ref 0.6–1.2)
EOSINOPHILS ABSOLUTE: 0.1 K/UL (ref 0–0.6)
EOSINOPHILS RELATIVE PERCENT: 3.5 %
GFR AFRICAN AMERICAN: 39
GFR NON-AFRICAN AMERICAN: 32
GLUCOSE BLD-MCNC: 178 MG/DL (ref 70–99)
GLUCOSE BLD-MCNC: 196 MG/DL (ref 70–99)
GLUCOSE BLD-MCNC: 218 MG/DL (ref 70–99)
GLUCOSE BLD-MCNC: 241 MG/DL (ref 70–99)
GLUCOSE BLD-MCNC: 292 MG/DL (ref 70–99)
HCT VFR BLD CALC: 25.3 % (ref 36–48)
HEMOGLOBIN: 8 G/DL (ref 12–16)
LYMPHOCYTES ABSOLUTE: 0.4 K/UL (ref 1–5.1)
LYMPHOCYTES RELATIVE PERCENT: 10.7 %
MAGNESIUM: 1.6 MG/DL (ref 1.8–2.4)
MCH RBC QN AUTO: 25.5 PG (ref 26–34)
MCHC RBC AUTO-ENTMCNC: 31.7 G/DL (ref 31–36)
MCV RBC AUTO: 80.4 FL (ref 80–100)
MONOCYTES ABSOLUTE: 0.3 K/UL (ref 0–1.3)
MONOCYTES RELATIVE PERCENT: 7.6 %
NEUTROPHILS ABSOLUTE: 3 K/UL (ref 1.7–7.7)
NEUTROPHILS RELATIVE PERCENT: 77.5 %
PDW BLD-RTO: 17.2 % (ref 12.4–15.4)
PERFORMED ON: ABNORMAL
PHOSPHORUS: 3.3 MG/DL (ref 2.5–4.9)
PLATELET # BLD: 161 K/UL (ref 135–450)
PMV BLD AUTO: 7.6 FL (ref 5–10.5)
POTASSIUM SERPL-SCNC: 4.1 MMOL/L (ref 3.5–5.1)
RBC # BLD: 3.15 M/UL (ref 4–5.2)
SODIUM BLD-SCNC: 137 MMOL/L (ref 136–145)
WBC # BLD: 3.9 K/UL (ref 4–11)

## 2020-12-17 PROCEDURE — 2500000003 HC RX 250 WO HCPCS: Performed by: INTERNAL MEDICINE

## 2020-12-17 PROCEDURE — 36415 COLL VENOUS BLD VENIPUNCTURE: CPT

## 2020-12-17 PROCEDURE — 6370000000 HC RX 637 (ALT 250 FOR IP): Performed by: INTERNAL MEDICINE

## 2020-12-17 PROCEDURE — 85025 COMPLETE CBC W/AUTO DIFF WBC: CPT

## 2020-12-17 PROCEDURE — 83735 ASSAY OF MAGNESIUM: CPT

## 2020-12-17 PROCEDURE — 6370000000 HC RX 637 (ALT 250 FOR IP): Performed by: NURSE PRACTITIONER

## 2020-12-17 PROCEDURE — 6360000002 HC RX W HCPCS: Performed by: INTERNAL MEDICINE

## 2020-12-17 PROCEDURE — 1200000000 HC SEMI PRIVATE

## 2020-12-17 PROCEDURE — 2580000003 HC RX 258: Performed by: INTERNAL MEDICINE

## 2020-12-17 PROCEDURE — 80069 RENAL FUNCTION PANEL: CPT

## 2020-12-17 PROCEDURE — 99233 SBSQ HOSP IP/OBS HIGH 50: CPT | Performed by: NURSE PRACTITIONER

## 2020-12-17 RX ADMIN — POTASSIUM CHLORIDE 20 MEQ: 20 TABLET, EXTENDED RELEASE ORAL at 09:21

## 2020-12-17 RX ADMIN — INSULIN LISPRO 1 UNITS: 100 INJECTION, SOLUTION INTRAVENOUS; SUBCUTANEOUS at 22:02

## 2020-12-17 RX ADMIN — PANTOPRAZOLE SODIUM 40 MG: 40 TABLET, DELAYED RELEASE ORAL at 09:21

## 2020-12-17 RX ADMIN — MAGNESIUM GLUCONATE 500 MG ORAL TABLET 400 MG: 500 TABLET ORAL at 22:02

## 2020-12-17 RX ADMIN — METOLAZONE 10 MG: 2.5 TABLET ORAL at 12:33

## 2020-12-17 RX ADMIN — ATORVASTATIN CALCIUM 20 MG: 10 TABLET, FILM COATED ORAL at 12:33

## 2020-12-17 RX ADMIN — KETOTIFEN FUMARATE 1 DROP: 0.35 SOLUTION/ DROPS OPHTHALMIC at 22:04

## 2020-12-17 RX ADMIN — TIZANIDINE 4 MG: 4 TABLET ORAL at 09:21

## 2020-12-17 RX ADMIN — METOLAZONE 10 MG: 2.5 TABLET ORAL at 22:01

## 2020-12-17 RX ADMIN — FUROSEMIDE 15 MG/HR: 10 INJECTION, SOLUTION INTRAMUSCULAR; INTRAVENOUS at 13:27

## 2020-12-17 RX ADMIN — TIZANIDINE 4 MG: 4 TABLET ORAL at 22:01

## 2020-12-17 RX ADMIN — ASPIRIN 81 MG: 81 TABLET, COATED ORAL at 09:21

## 2020-12-17 RX ADMIN — MAGNESIUM SULFATE HEPTAHYDRATE 1 G: 1 INJECTION, SOLUTION INTRAVENOUS at 13:27

## 2020-12-17 RX ADMIN — INSULIN LISPRO 6 UNITS: 100 INJECTION, SOLUTION INTRAVENOUS; SUBCUTANEOUS at 12:33

## 2020-12-17 RX ADMIN — SERTRALINE HYDROCHLORIDE 125 MG: 50 TABLET ORAL at 22:01

## 2020-12-17 RX ADMIN — INSULIN LISPRO 4 UNITS: 100 INJECTION, SOLUTION INTRAVENOUS; SUBCUTANEOUS at 17:36

## 2020-12-17 RX ADMIN — MICONAZOLE NITRATE: 20 POWDER TOPICAL at 22:15

## 2020-12-17 RX ADMIN — INSULIN LISPRO 4 UNITS: 100 INJECTION, SOLUTION INTRAVENOUS; SUBCUTANEOUS at 09:11

## 2020-12-17 RX ADMIN — MICONAZOLE NITRATE: 20 POWDER TOPICAL at 12:37

## 2020-12-17 RX ADMIN — INSULIN GLARGINE 15 UNITS: 100 INJECTION, SOLUTION SUBCUTANEOUS at 22:02

## 2020-12-17 RX ADMIN — IRON SUCROSE 200 MG: 20 INJECTION, SOLUTION INTRAVENOUS at 15:00

## 2020-12-17 RX ADMIN — SPIRONOLACTONE 50 MG: 25 TABLET ORAL at 17:36

## 2020-12-17 RX ADMIN — FUROSEMIDE 15 MG/HR: 10 INJECTION, SOLUTION INTRAMUSCULAR; INTRAVENOUS at 22:16

## 2020-12-17 RX ADMIN — APIXABAN 5 MG: 5 TABLET, FILM COATED ORAL at 22:01

## 2020-12-17 RX ADMIN — FLUTICASONE PROPIONATE 1 SPRAY: 50 SPRAY, METERED NASAL at 09:22

## 2020-12-17 RX ADMIN — MAGNESIUM SULFATE HEPTAHYDRATE 1 G: 1 INJECTION, SOLUTION INTRAVENOUS at 12:34

## 2020-12-17 RX ADMIN — FUROSEMIDE 15 MG/HR: 10 INJECTION, SOLUTION INTRAMUSCULAR; INTRAVENOUS at 05:51

## 2020-12-17 RX ADMIN — LEVOTHYROXINE SODIUM 125 MCG: 0.12 TABLET ORAL at 05:51

## 2020-12-17 RX ADMIN — APIXABAN 5 MG: 5 TABLET, FILM COATED ORAL at 09:21

## 2020-12-17 RX ADMIN — POTASSIUM CHLORIDE 20 MEQ: 20 TABLET, EXTENDED RELEASE ORAL at 22:02

## 2020-12-17 RX ADMIN — KETOTIFEN FUMARATE 1 DROP: 0.35 SOLUTION/ DROPS OPHTHALMIC at 09:23

## 2020-12-17 RX ADMIN — MAGNESIUM GLUCONATE 500 MG ORAL TABLET 400 MG: 500 TABLET ORAL at 15:00

## 2020-12-17 RX ADMIN — SPIRONOLACTONE 50 MG: 25 TABLET ORAL at 09:21

## 2020-12-17 ASSESSMENT — ENCOUNTER SYMPTOMS: SHORTNESS OF BREATH: 1

## 2020-12-17 NOTE — PROGRESS NOTES
[facility]  Cardiology  Progress Note    Admission date:  2020    Reason for follow up visit: CHF    HPI/CC: Geri Renee is a 72 y.o. female who was admitted 2020 from [facility] for shortness of breath. Echo showed EF 55%, severe pulmonary HTN. Treated for CHF. Rhythm has been sinus. Subjective: Feels fatigued. Denies chest pain, shortness of breath, palpitations and dizziness. Vitals:  Blood pressure 126/71, pulse 80, temperature 98.1 °F (36.7 °C), temperature source Oral, resp. rate 18, height 5' 5\" (1.651 m), weight (!) 309 lb 14.4 oz (140.6 kg), SpO2 94 %.   Temp  Av.9 °F (36.6 °C)  Min: 97.4 °F (36.3 °C)  Max: 98.3 °F (36.8 °C)  Pulse  Av.3  Min: 56  Max: 80  BP  Min: 106/66  Max: 147/57  SpO2  Av.3 %  Min: 92 %  Max: 95 %    24 hour I/O    Intake/Output Summary (Last 24 hours) at 2020 1058  Last data filed at 2020 3077  Gross per 24 hour   Intake 360 ml   Output 7775 ml   Net -7415 ml     Current Facility-Administered Medications   Medication Dose Route Frequency Provider Last Rate Last Admin    miconazole (MICOTIN) 2 % powder   Topical BID Patricia Navarro MD        aspirin EC tablet 81 mg  81 mg Oral Daily DANIELLE Rodríguez - CNP   81 mg at 20 0921    ketotifen (ZADITOR) 0.025 % ophthalmic solution 1 drop  1 drop Both Eyes BID Cherie Chris MD   1 drop at 20 7906    metOLazone (ZAROXOLYN) tablet 10 mg  10 mg Oral BID Stan Cai MD   10 mg at 20 2115    spironolactone (ALDACTONE) tablet 50 mg  50 mg Oral BID Stan Cai MD   50 mg at 20 1765    magnesium sulfate 1 g in dextrose 5% 100 mL IVPB  1 g Intravenous PRN Patricia Navarro MD   Stopped at 20 1748    iron sucrose (VENOFER) 200 mg in sodium chloride 0.9 % 100 mL IVPB  200 mg Intravenous Q24H Patricia Navarro MD   Stopped at 20 3149    tetrahydrozoline 0.05 % ophthalmic solution 1 drop  1 drop Both Eyes Q4H PRN Ashley Cali MD   1 drop at 12/14/20 1401    potassium chloride (KLOR-CON M) extended release tablet 20 mEq  20 mEq Oral BID Jaclyn Mosher MD   20 mEq at 12/17/20 0921    fluticasone (FLONASE) 50 MCG/ACT nasal spray 1 spray  1 spray Each Nostril Daily Eual Frieze, APRN - CNP   1 spray at 12/17/20 2734    apixaban (ELIQUIS) tablet 5 mg  5 mg Oral BID Eual Frieze, APRN - CNP   5 mg at 12/17/20 5627    atorvastatin (LIPITOR) tablet 20 mg  20 mg Oral Daily Eual Frieze, APRN - CNP   20 mg at 12/16/20 0830    insulin glargine (LANTUS) injection vial 15 Units  15 Units Subcutaneous Nightly Eual Frieze, APRN - CNP   15 Units at 12/16/20 2116    levothyroxine (SYNTHROID) tablet 125 mcg  125 mcg Oral Daily Eual Frieze, APRN - CNP   125 mcg at 12/17/20 0551    pantoprazole (PROTONIX) tablet 40 mg  40 mg Oral Daily Eual Frieze, APRN - CNP   40 mg at 12/17/20 1717    sertraline (ZOLOFT) tablet 125 mg  125 mg Oral Nightly Eual Frieze, APRN - CNP   125 mg at 12/16/20 2120    sodium chloride flush 0.9 % injection 10 mL  10 mL Intravenous 2 times per day Eual Frieze, APRN - CNP   10 mL at 12/16/20 2116    sodium chloride flush 0.9 % injection 10 mL  10 mL Intravenous PRN Eual Frieze, APRN - CNP        acetaminophen (TYLENOL) tablet 650 mg  650 mg Oral Q6H PRN Eual Frieze, APRN - CNP   650 mg at 12/10/20 2124    Or    acetaminophen (TYLENOL) suppository 650 mg  650 mg Rectal Q6H PRN Eual Frieze, APRN - CNP        polyethylene glycol (GLYCOLAX) packet 17 g  17 g Oral Daily PRN Eual Frieze, APRN - CNP        promethazine (PHENERGAN) tablet 12.5 mg  12.5 mg Oral Q6H PRN Eual Frieze, APRN - CNP        Or    ondansetron (ZOFRAN) injection 4 mg  4 mg Intravenous Q6H PRN Eual Frieze, APRN - CNP        insulin lispro (HUMALOG) injection vial 0-12 Units  0-12 Units Subcutaneous TID WC DANIELLE Almaguer CNP   4 Units at 12/17/20 0911    insulin lispro (HUMALOG) injection vial 0-6 Units  0-6 Units Subcutaneous Nightly Ana Maria Aguirre, APRN - CNP   1 Units at 12/16/20 2116    glucose (GLUTOSE) 40 % oral gel 15 g  15 g Oral PRN John Paul Deleon, APRN - CNP        dextrose 50 % IV solution  12.5 g Intravenous PRN John Paul Deleon, APRN - CNP        glucagon (rDNA) injection 1 mg  1 mg Intramuscular PRN John Paul Deleon, APRN - CNP        dextrose 5 % solution  100 mL/hr Intravenous PRN John Paul Deloen, DANIELLE - CNP        furosemide (LASIX) 100 mg in dextrose 5 % 100 mL infusion  15 mg/hr Intravenous Continuous Krys Gabriel MD 15 mL/hr at 12/17/20 0551 15 mg/hr at 12/17/20 0551    perflutren lipid microspheres (DEFINITY) injection 1.65 mg  1.5 mL Intravenous ONCE PRN John Paul Deleon, DANIELLE - CNP        traMADol (ULTRAM) tablet 25 mg  25 mg Oral Q6H PRN Lizz Tyler MD   25 mg at 12/15/20 0702    tiZANidine (ZANAFLEX) tablet 4 mg  4 mg Oral BID Lizz Tyler MD   4 mg at 12/17/20 0921    carboxymethylcellulose PF (THERATEARS) 1 % ophthalmic gel 1 drop  1 drop Both Eyes PRN Adilene Joel MD   1 drop at 12/14/20 2209    sodium chloride flush 0.9 % injection 10 mL  10 mL Intravenous PRN Scott Coleman PA-C   10 mL at 12/05/20 0390     Review of Systems   Constitutional: Positive for fatigue. Respiratory: Positive for shortness of breath. Cardiovascular: Positive for leg swelling. Negative for chest pain and palpitations. Neurological: Negative.       Objective:     Telemetry monitor: AF    Physical Exam:  Constitutional:  Comfortable and alert, NAD, appears older than stated age, obese  Eyes: PERRL, sclera nonicteric  Neck:  Supple, no masses, no thyroidmegaly, JVD difficult to assess  Skin:  Warm and dry; no rash or lesions  Heart: Regular, normal apex, S1 and S2 normal, no M/G/R  Lungs:  Normal respiratory effort; clear; no wheezing/rhonchi/rales  Abdomen: soft, non tender, + bowel sounds, obese  Extremities:  2+ BLE edema  Neuro: alert and oriented, moves legs and arms equally, normal mood and affect    Data Reviewed:    Echo 12/7/2020:   Limited only f/u for LVEF. Normal left ventricular systolic function with ejection fraction of 55-60%. No regional wall motion abnormalites are seen. Septal bounce due to right ventricular pressure and volume overload. The right ventricle is mildly enlarged. Right ventricular systolic function is moderately reduced . Moderate tricuspid regurgitation. Definity echo contrast used. Systolic pulmonic artery pressure (SPAP) is estimated at 67 mmHg consistent   with severe pulmonary hypertension (Right atrial pressure of 8 mmHg). The right atrium is moderately dilated.     Lab Reviewed:     Renal Profile:  Lab Results   Component Value Date    CREATININE 1.6 12/17/2020    BUN 36 12/17/2020     12/17/2020    K 4.1 12/17/2020    K 4.4 12/04/2020    CL 94 12/17/2020    CO2 34 12/17/2020     CBC:    Lab Results   Component Value Date    WBC 3.9 12/17/2020    RBC 3.15 12/17/2020    HGB 8.0 12/17/2020    HCT 25.3 12/17/2020    MCV 80.4 12/17/2020    RDW 17.2 12/17/2020     12/17/2020     BNP:    Lab Results   Component Value Date    PROBNP 3,574 12/04/2020    PROBNP 1,620 08/20/2020     Fasting Lipid Panel:    Lab Results   Component Value Date    CHOL 105 12/06/2020    HDL 58 12/06/2020    TRIG 54 12/06/2020     Cardiac Enzymes:  CK/MbTroponin  Lab Results   Component Value Date    TROPONINI 0.04 12/05/2020     PT/ INR   Lab Results   Component Value Date    INR 2.03 12/04/2020    INR 1.79 08/20/2020    PROTIME 23.7 12/04/2020    PROTIME 20.9 08/20/2020     PTT No results found for: PTT   Lab Results   Component Value Date    MG 1.60 12/17/2020    No results found for: TSH    All labs and imaging reviewed today    Assessment:  Elevated troponin  Acute on chronic diastolic CHF, right heart failure: remains fluid overloaded, -24L and - 28 lbs (bed scales)   - dry weight felt to be about 220 lbs (315 lbs today)  Atrial fibrillation of unknown duration, appears chronic: rate controlled   - [id number] score > 2 on eliquis  CAD: s/p CABG x3 1995  Pulmonary HTN: severe on echo 12/7/2020  TR: moderate  HTN: stable  HLD  DM  CKD  Hypomagenesemia  Anemia: venofer started 12/14/2020  Obesity  Hypomagnesemia  History of CVA    Plan:   1. Replace magnesium  2. Aggressive diuresis as renal function and BP allow; good progress thus far but remains fluid overloaded  3. Ischemic evaluation once diuresed, likely 12/21/2020  4. Consider repeat echo once more euvolemic  5. Continue aspirin, statin for CAD  6. Add beta blocker once more euvolemic  7. Continue eliquis, monitor CBC  8.  Daily weights (standing scale), monitor BMP, I/Os    DANIELLE Costa-CNP  [address]  [phone]

## 2020-12-17 NOTE — PROGRESS NOTES
Patient Name: Roberto Carlos Hdz                                                    Primary Physician: Luciano Montemayor MD  Admitting Dx: [facility]                 Inpatient Progress Note                                         Plan for today:     Seen and examined in the room  Wt trend 345--> 322--> 315--> 309 lbs   diuresing great 6300 ml. Needs atleast another 30  lbs off.   continue Lasix gtt and metolazone high dose for another 48 hrs then cut down to oral and see response before discharge. .will need to stay here till next week. Inc. aldactone dose    creat 1.3--> 1.4--> 1.6 , expect it to rise with aggressive diuresis. Assessment / Plan:       Generalized anasarca/massive volume overload. Tells me her weight is 220 pounds dry. Currently is 335 pounds. Started on Lasix drip I will add metolazone. Watch for hypokalemia. She may get alkalotic may need to use some doses of albumin. I will also start the patient on Aldactone for the time being for potassium conservation. Last admission she was down to 324 pounds at discharge.        Acute Kidney Injury on CKD III:      DIONNE likely due to - cardiorenal syndrome, r/o urinary tract infection, also has cirrhosis of the liver with decompensation  Cr on consultation 1.3   Baseline Cr-1.5 with wide fluctuations. from 4/9  She is followed by Dr. Leif Duckworth follow-up was 5/19     UA trace blood ,  otherwise bland  Renal Imaging:- 8/20- no hydronephrosis, no sig abnormality  Echo: 11/18-EF normal moderate TR     Hypertension   BP goal inpatient [id number] systolic inpatient  Known to have atrial fibrillation     Cirrhosis of the liver-with portal  Diabetes mellitus on insulin  Hyperuricemia      DM   Morbid obesity. History of A. fib. Patient history of medication noncompliance. Patient is a full code.       Please call with any questions or concerns. [phone]. Joe Asencio        Subjective:      Interval hx:   Seen in room without family. Afebrile  Appetite fair. Diuresing . CC:  SOB ,volume overload. HPI: The patient is a 72-year-old  female with a past medical history of hypertension, hyperlipidemia, diabetes mellitus, morbid obesity, history of congestive heart failure, diastolic heart failure, coronary artery disease, atrial fibrillation, bilateral lower extremity edema previous history of cellulitis, and has had multiple admissions for weight gain and volume overload. She also has [place] cirrhosis/steatohepatitis. At the nursing home she is on multiple diuretics. She was last admitted on 12/5/2020 with volume overload and weight gain.   She tells me that her good weight is around 220 pounds    PMH:  Past Medical History:   Diagnosis Date    Anxiety     Arthritis     Atrial fibrillation (HCC)     CAD (coronary artery disease)     CHF (congestive heart failure) (HCC)     Chronic pain syndrome     Cirrhosis (HCC)     Depression     Diabetes mellitus ([address]     GERD (gastroesophageal reflux disease)     Hyperlipidemia     Hypertension     Kidney disease     Thyroid disease     Vitamin D deficiency          Objective:     Vitals:   Vitals:    12/17/20 0004 12/17/20 0331 12/17/20 0919 12/17/20 1155   BP: 121/63 106/66 126/71 (!) 131/53   Pulse: 76 71 80 68   Resp: 16 16 18 18   Temp: 98.3 °F (36.8 °C) 97.4 °F (36.3 °C) 98.1 °F (36.7 °C) 98.6 °F (37 °C)   TempSrc: Oral Axillary Oral Oral   SpO2: 93% 93% 94% 94%   Weight:  (!) 309 lb 14.4 oz (140.6 kg)     Height:         REVIEW OF SYSTEMS:   POSITIVE ITEMS IN BOLD:  GEN:  fevers, chills, sweats, fatigue and weight loss   EYE: eyelid swelling, no redness  ENT:   nasal congestion, sore mouth and sore throat   Resp:   cough, hemoptysis, pneumonia or dyspnea on exertion   Card: chest pain, chest pressure/discomfort, dyspnea, palpitations,  lower extremity edema   GI: nausea, vomiting, diarrhea, constipation and abdominal pain   :  dysuria, nocturia, urinary incontinence, hesitancy and hematuria   Derm:  rash, skin lesion(s), pruritus and dryness   Neuro:  headaches, dizziness, seizures, gait problems, tremor and weakness   MS:  myalgias, arthralgias, neck pain and back pain     PE:  Gen appearance: Alert, appears stated age and cooperative   Eyes: Eyelids,conjunctiva and pupils look normal   ENT: External inspection of the ears and nose are within normal limits             Oral mucosa  Is moist   Neuro: Grossly no focal neurological deficits, normal sensation, grossly cranial nerves intact   Neck:  ++ JVD, no mass, no thyroid enlargement   Cardio: S1 S2 normal, No added sounds   Resp: normal effort, clear to auscultation     GI:  Soft, non-tender, BS +          No palpable kidney, no renal angle tenderness   MS: No swollen or tender joints, no cyanosis, clubbing   DERM: no rashes, thickening   EDEMA: 2-3+ gen anasarca. Up to abdominal wall hips thighs and legs. Lungs relatively clear.       I/Os:         Intake/Output Summary (Last 24 hours) at 12/17/2020 1245  Last data filed at 12/17/2020 1242  Gross per 24 hour   Intake 360 ml   Output 6900 ml   Net -6540 ml       Meds:     Scheduled Meds:   miconazole   Topical BID    aspirin  81 mg Oral Daily    ketotifen  1 drop Both Eyes BID    metOLazone  10 mg Oral BID    spironolactone  50 mg Oral BID    iron sucrose (VENOFER) iv piggyback 100 mL (Admin over 60 minutes)  200 mg Intravenous Q24H    potassium chloride  20 mEq Oral BID    fluticasone  1 spray Each Nostril Daily    apixaban  5 mg Oral BID    atorvastatin  20 mg Oral Daily    insulin glargine  15 Units Subcutaneous Nightly    levothyroxine  125 mcg Oral Daily    pantoprazole  40 mg Oral Daily    sertraline  125 mg Oral Nightly    sodium chloride flush  10 mL Intravenous 2 times per day    insulin lispro  0-12 Units Subcutaneous TID WC    insulin lispro  0-6 Units Subcutaneous Nightly    tiZANidine  4 mg Oral BID     Continuous Infusions:   dextrose      furosemide (LASIX) 1mg/ml infusion 15 mg/hr (12/17/20 0551)     PRN Meds:magnesium sulfate, tetrahydrozoline, sodium chloride flush, acetaminophen **OR** acetaminophen, polyethylene glycol, promethazine **OR** ondansetron, glucose, dextrose, glucagon (rDNA), dextrose, perflutren lipid microspheres, traMADol, carboxymethylcellulose PF, sodium chloride flush    Diet:  DIET LOW SODIUM 2 GM; 1500 ml    CBC:   Recent Labs     12/15/20  0843 12/17/20  0907   WBC 3.4* 3.9*   HGB 7.6* 8.0*   HCT 24.3* 25.3*    161     BMP:    Recent Labs     12/15/20  0843 12/16/20  0813 12/17/20  0907    138 137   K 4.1 4.0 4.1   CL 99 96* 94*   CO2 30 32 34*   BUN 36* 36* 36*   CREATININE 1.3* 1.4* 1.6*   GLUCOSE 160* 130* 178*   MG 1.70* 1.70* 1.60*   PHOS 3.1 3.2 3.3     ABGs: No results found for: PHART, PO2ART, [address] [facility]. Off: [phone]  Cell: [phone].  ( until 5 pm)

## 2020-12-17 NOTE — CARE COORDINATION
12/17/20 Pt is LTC at [facility], precert has been started for skilled but pt can return when ready without precert. Pt will only need a rapid Covid on day of d/c. Pt not medically ready for d/c yet and still on lasix drip.  Will follow

## 2020-12-17 NOTE — PROGRESS NOTES
Hospitalist Progress Note      PCP: Rosalia Martinez MD    Date of Admission: 12/4/2020       Chief Complaint: Bilateral leg edema and weight gain    Hospital course: Admitted with edema. Bolus Lasix did not work. Currently on Lasix drip. Also known to have underlying cirrhosis. Seen by cardiology and nephrology. Lasix drip is currently at 15 mg/h. Norvasc stopped 2 days ago. Feels slightly better than a couple days ago. No chest pain. Has shortness of breath with exertion. Similar to before      Subjective: No chest pain, no shortness of breath at rest, no nausea or vomiting at rest.  Mild shortness of breath with exertion.   No changes in past 24 hours       Medications:  Reviewed    Infusion Medications    dextrose      furosemide (LASIX) 1mg/ml infusion 15 mg/hr (12/17/20 0551)     Scheduled Medications    miconazole   Topical BID    aspirin  81 mg Oral Daily    ketotifen  1 drop Both Eyes BID    metOLazone  10 mg Oral BID    spironolactone  50 mg Oral BID    iron sucrose (VENOFER) iv piggyback 100 mL (Admin over 60 minutes)  200 mg Intravenous Q24H    potassium chloride  20 mEq Oral BID    fluticasone  1 spray Each Nostril Daily    apixaban  5 mg Oral BID    atorvastatin  20 mg Oral Daily    insulin glargine  15 Units Subcutaneous Nightly    levothyroxine  125 mcg Oral Daily    pantoprazole  40 mg Oral Daily    sertraline  125 mg Oral Nightly    sodium chloride flush  10 mL Intravenous 2 times per day    insulin lispro  0-12 Units Subcutaneous TID WC    insulin lispro  0-6 Units Subcutaneous Nightly    tiZANidine  4 mg Oral BID     PRN Meds: magnesium sulfate, tetrahydrozoline, sodium chloride flush, acetaminophen **OR** acetaminophen, polyethylene glycol, promethazine **OR** ondansetron, glucose, dextrose, glucagon (rDNA), dextrose, perflutren lipid microspheres, traMADol, carboxymethylcellulose PF, sodium chloride flush      Intake/Output Summary (Last 24 hours) at 12/17/2020 0953  Last data filed at 12/17/2020 0926  Gross per 24 hour   Intake 360 ml   Output 7775 ml   Net -7415 ml       Physical Exam Performed:    /71   Pulse 80   Temp 98.1 °F (36.7 °C) (Oral)   Resp 18   Ht 5' 5\" (1.651 m)   Wt (!) 309 lb 14.4 oz (140.6 kg) Comment: pt refused standing scale. RN aware. SpO2 94%   BMI 51.57 kg/m²     General appearance: No apparent distress, appears stated age and cooperative.  Pleasant  HEENT: Pupils equal, round, and reactive to light. Conjunctivae/corneas clear. Neck: Supple, with full range of motion. No jugular venous distention. Trachea midline. Respiratory:  Normal respiratory effort. Clear to auscultation, bilaterally without Wheezes/Rhonchi.    Cardiovascular: Regular rate and rhythm with normal S1/S2 without murmurs, rubs or gallops. Abdomen: Soft, non-tender, non-distended with normal bowel sounds. Obese abdomen  Musculoskeletal: No clubbing, cyanosis. 3+ lower extremity edema. Pitting. Similar to a couple days ago. Skin: Skin color, texture, turgor normal.  No rashes or lesions. Neurologic:  Neurovascularly intact without any focal sensory/motor deficits. Cranial nerves: II-XII intact, grossly non-focal.  Psychiatric: Alert and oriented, thought content appropriate, normal insight  Capillary Refill: Brisk,< 3 seconds   Peripheral Pulses: +2 palpable, equal bilaterally     I examined the patient today (12/17/20). Physical exam is similar to yesterday (12/16). Labs:   Recent Labs     12/15/20  0843 12/17/20  0907   WBC 3.4* 3.9*   HGB 7.6* 8.0*   HCT 24.3* 25.3*    161     Recent Labs     12/15/20  0843 12/16/20  0813 12/17/20  0907    138 137   K 4.1 4.0 4.1   CL 99 96* 94*   CO2 30 32 34*   BUN 36* 36* 36*   CREATININE 1.3* 1.4* 1.6*   CALCIUM 10.2 10.4 10.8*   PHOS 3.1 3.2 3.3     No results for input(s): AST, ALT, BILIDIR, BILITOT, ALKPHOS in the last 72 hours. No results for input(s): INR in the last 72 hours.   No results for input(s): Arlin Knight in the last 72 hours. Urinalysis:      Lab Results   Component Value Date    NITRU Negative 12/05/2020    WBCUA 3-5 12/05/2020    BACTERIA 2+ 08/20/2020    RBCUA 5-10 12/05/2020    BLOODU SMALL 12/05/2020    SPECGRAV 1.025 12/05/2020    GLUCOSEU Negative 12/05/2020       Radiology:  CT ABDOMEN PELVIS WO CONTRAST Additional Contrast? None   Final Result   1. Findings of diffuse volume overload including abdominal and pelvic   ascites, anasarca and findings of congestive heart failure in the lower lungs. 2. Hepatic morphologic features of cirrhosis. No gross hepatic lesion   evident. 3. Splenomegaly, typically associated with portal venous hypertension but   nonspecific. 4. Mild right upper quadrant lymph node enlargement is very likely reactive. XR CHEST PORTABLE   Final Result   Vascular congestion. No acute pulmonary disease. Stable enlargement of cardiac silhouette. Assessment/Plan:    Active Hospital Problems    Diagnosis    Acute on chronic diastolic CHF (congestive heart failure) ([facility]) [I50.33]    Morbid obesity ([facility] [E66.01]    Fluid overload [E87.70]    CHF (congestive heart failure) (HCC) [I50.9]    CAD (coronary artery disease) [I25.10]    Acute kidney injury superimposed on chronic kidney disease ([facility] [N17.9, N18.9]    Essential hypertension [I10]    Type 2 diabetes mellitus, with long-term current use of insulin ([facility]) [E11.9, Z79.4]    Stage 3 chronic kidney disease [N18.30]     PLAN:        Acute on chronic diastolic CHF  Edema also complicated by cirrhosis. Continue furosemide drip and spironolactone  Off Norvasc  Furosemide drip rate is currently 15 mg/h  Cardiology consulted. Patient admits to past noncompliance. Does not want to go to bathroom often and either skips or refuses diuretics frequently. Evaluated by cardiology. Likely to require ischemic evaluation once stable.   Possibly as outpatient. Overall, stable past 24 hours with good diuresis. Acute kidney injury  Creatinine up to 1.6, as expected with aggressive diuresis. There might be a component of cardiorenal syndrome. Nephrology input appreciated  Monitoring daily basic metabolic panel    Paroxysmal atrial fibrillation  Heart rate is controlled and stable  On Eliquis for stroke prophylaxis    Diabetes mellitus type 2  Continue sliding scale insulin. Blood sugars are acceptable.     Chronic normocytic anemia  Iron studies are consistent with iron deficiency. Will receive a total of 1 g IV Venofer infusion. No signs of active blood loss, but patient is on anticoagulation. Could have last blood slowly over period of time. Today's hemoglobin is higher than yesterday    Dyslipidemia  Continue statin     Morbid obesity  Body mass index is 80.76 kg/m².  Complicating assessment and treatment. Placing patient at risk for multiple co-morbidities as well as early death and contributing to the patient's presentation. Counseled on weight loss. Noted BMI is probably inaccurate due to extensive edema. However, patient is undoubtedly overweight.           DVT Prophylaxis: on eliquis  Diet: DIET LOW SODIUM 2 GM; 1500 ml  Code Status: Full Code    PT/OT Eval Status: Skilled nursing facility recommended.     Dispo -unclear date of discharge due to high residual fluid overload.   Continue to follow    Thee Nguyen MD

## 2020-12-18 LAB
ALBUMIN SERPL-MCNC: 3.6 G/DL (ref 3.4–5)
ANION GAP SERPL CALCULATED.3IONS-SCNC: 10 MMOL/L (ref 3–16)
BUN BLDV-MCNC: 37 MG/DL (ref 7–20)
CALCIUM SERPL-MCNC: 10.6 MG/DL (ref 8.3–10.6)
CHLORIDE BLD-SCNC: 89 MMOL/L (ref 99–110)
CO2: 34 MMOL/L (ref 21–32)
CREAT SERPL-MCNC: 1.8 MG/DL (ref 0.6–1.2)
EKG ATRIAL RATE: 44 BPM
EKG ATRIAL RATE: 47 BPM
EKG DIAGNOSIS: NORMAL
EKG DIAGNOSIS: NORMAL
EKG P AXIS: 106 DEGREES
EKG P-R INTERVAL: 200 MS
EKG Q-T INTERVAL: 512 MS
EKG Q-T INTERVAL: 534 MS
EKG QRS DURATION: 104 MS
EKG QRS DURATION: 98 MS
EKG QTC CALCULATION (BAZETT): 453 MS
EKG QTC CALCULATION (BAZETT): 456 MS
EKG R AXIS: 102 DEGREES
EKG R AXIS: 111 DEGREES
EKG T AXIS: 102 DEGREES
EKG T AXIS: 104 DEGREES
EKG VENTRICULAR RATE: 44 BPM
EKG VENTRICULAR RATE: 47 BPM
GFR AFRICAN AMERICAN: 34
GFR NON-AFRICAN AMERICAN: 28
GLUCOSE BLD-MCNC: 168 MG/DL (ref 70–99)
GLUCOSE BLD-MCNC: 237 MG/DL (ref 70–99)
GLUCOSE BLD-MCNC: 255 MG/DL (ref 70–99)
GLUCOSE BLD-MCNC: 261 MG/DL (ref 70–99)
GLUCOSE BLD-MCNC: 291 MG/DL (ref 70–99)
MAGNESIUM: 1.8 MG/DL (ref 1.8–2.4)
PERFORMED ON: ABNORMAL
PHOSPHORUS: 3.2 MG/DL (ref 2.5–4.9)
POTASSIUM SERPL-SCNC: 4.1 MMOL/L (ref 3.5–5.1)
SODIUM BLD-SCNC: 133 MMOL/L (ref 136–145)
TROPONIN: 0.05 NG/ML

## 2020-12-18 PROCEDURE — 6360000002 HC RX W HCPCS: Performed by: INTERNAL MEDICINE

## 2020-12-18 PROCEDURE — 36415 COLL VENOUS BLD VENIPUNCTURE: CPT

## 2020-12-18 PROCEDURE — 6370000000 HC RX 637 (ALT 250 FOR IP): Performed by: INTERNAL MEDICINE

## 2020-12-18 PROCEDURE — 93005 ELECTROCARDIOGRAM TRACING: CPT | Performed by: INTERNAL MEDICINE

## 2020-12-18 PROCEDURE — 6370000000 HC RX 637 (ALT 250 FOR IP): Performed by: NURSE PRACTITIONER

## 2020-12-18 PROCEDURE — 93005 ELECTROCARDIOGRAM TRACING: CPT | Performed by: NURSE PRACTITIONER

## 2020-12-18 PROCEDURE — 93010 ELECTROCARDIOGRAM REPORT: CPT | Performed by: INTERNAL MEDICINE

## 2020-12-18 PROCEDURE — 80069 RENAL FUNCTION PANEL: CPT

## 2020-12-18 PROCEDURE — 83735 ASSAY OF MAGNESIUM: CPT

## 2020-12-18 PROCEDURE — 1200000000 HC SEMI PRIVATE

## 2020-12-18 PROCEDURE — 99233 SBSQ HOSP IP/OBS HIGH 50: CPT | Performed by: NURSE PRACTITIONER

## 2020-12-18 PROCEDURE — 2580000003 HC RX 258: Performed by: NURSE PRACTITIONER

## 2020-12-18 PROCEDURE — 2580000003 HC RX 258: Performed by: INTERNAL MEDICINE

## 2020-12-18 PROCEDURE — 84484 ASSAY OF TROPONIN QUANT: CPT

## 2020-12-18 RX ORDER — INSULIN GLARGINE 100 [IU]/ML
20 INJECTION, SOLUTION SUBCUTANEOUS NIGHTLY
Status: DISCONTINUED | OUTPATIENT
Start: 2020-12-18 | End: 2020-12-24 | Stop reason: HOSPADM

## 2020-12-18 RX ADMIN — METOLAZONE 10 MG: 2.5 TABLET ORAL at 21:16

## 2020-12-18 RX ADMIN — SODIUM CHLORIDE, PRESERVATIVE FREE 10 ML: 5 INJECTION INTRAVENOUS at 10:03

## 2020-12-18 RX ADMIN — MAGNESIUM GLUCONATE 500 MG ORAL TABLET 400 MG: 500 TABLET ORAL at 13:28

## 2020-12-18 RX ADMIN — INSULIN LISPRO 2 UNITS: 100 INJECTION, SOLUTION INTRAVENOUS; SUBCUTANEOUS at 10:01

## 2020-12-18 RX ADMIN — INSULIN GLARGINE 20 UNITS: 100 INJECTION, SOLUTION SUBCUTANEOUS at 21:17

## 2020-12-18 RX ADMIN — METOLAZONE 10 MG: 2.5 TABLET ORAL at 10:02

## 2020-12-18 RX ADMIN — TIZANIDINE 4 MG: 4 TABLET ORAL at 10:03

## 2020-12-18 RX ADMIN — INSULIN LISPRO 6 UNITS: 100 INJECTION, SOLUTION INTRAVENOUS; SUBCUTANEOUS at 13:30

## 2020-12-18 RX ADMIN — POTASSIUM CHLORIDE 20 MEQ: 20 TABLET, EXTENDED RELEASE ORAL at 13:28

## 2020-12-18 RX ADMIN — ASPIRIN 81 MG: 81 TABLET, COATED ORAL at 13:28

## 2020-12-18 RX ADMIN — KETOTIFEN FUMARATE 1 DROP: 0.35 SOLUTION/ DROPS OPHTHALMIC at 21:19

## 2020-12-18 RX ADMIN — SPIRONOLACTONE 50 MG: 25 TABLET ORAL at 10:02

## 2020-12-18 RX ADMIN — IRON SUCROSE 200 MG: 20 INJECTION, SOLUTION INTRAVENOUS at 18:55

## 2020-12-18 RX ADMIN — SPIRONOLACTONE 50 MG: 25 TABLET ORAL at 18:34

## 2020-12-18 RX ADMIN — INSULIN LISPRO 4 UNITS: 100 INJECTION, SOLUTION INTRAVENOUS; SUBCUTANEOUS at 18:36

## 2020-12-18 RX ADMIN — SODIUM CHLORIDE, PRESERVATIVE FREE 10 ML: 5 INJECTION INTRAVENOUS at 21:19

## 2020-12-18 RX ADMIN — FUROSEMIDE 15 MG/HR: 10 INJECTION, SOLUTION INTRAMUSCULAR; INTRAVENOUS at 18:34

## 2020-12-18 RX ADMIN — KETOTIFEN FUMARATE 1 DROP: 0.35 SOLUTION/ DROPS OPHTHALMIC at 06:00

## 2020-12-18 RX ADMIN — TIZANIDINE 4 MG: 4 TABLET ORAL at 21:17

## 2020-12-18 RX ADMIN — SERTRALINE HYDROCHLORIDE 125 MG: 50 TABLET ORAL at 21:17

## 2020-12-18 RX ADMIN — MAGNESIUM GLUCONATE 500 MG ORAL TABLET 400 MG: 500 TABLET ORAL at 21:17

## 2020-12-18 RX ADMIN — FUROSEMIDE 15 MG/HR: 10 INJECTION, SOLUTION INTRAMUSCULAR; INTRAVENOUS at 10:04

## 2020-12-18 RX ADMIN — ATORVASTATIN CALCIUM 20 MG: 10 TABLET, FILM COATED ORAL at 13:28

## 2020-12-18 RX ADMIN — INSULIN LISPRO 3 UNITS: 100 INJECTION, SOLUTION INTRAVENOUS; SUBCUTANEOUS at 21:18

## 2020-12-18 RX ADMIN — LEVOTHYROXINE SODIUM 125 MCG: 0.12 TABLET ORAL at 07:49

## 2020-12-18 RX ADMIN — POTASSIUM CHLORIDE 20 MEQ: 20 TABLET, EXTENDED RELEASE ORAL at 21:17

## 2020-12-18 RX ADMIN — APIXABAN 5 MG: 5 TABLET, FILM COATED ORAL at 21:17

## 2020-12-18 RX ADMIN — FLUTICASONE PROPIONATE 1 SPRAY: 50 SPRAY, METERED NASAL at 13:28

## 2020-12-18 RX ADMIN — MICONAZOLE NITRATE: 20 POWDER TOPICAL at 21:32

## 2020-12-18 RX ADMIN — APIXABAN 5 MG: 5 TABLET, FILM COATED ORAL at 10:02

## 2020-12-18 RX ADMIN — PANTOPRAZOLE SODIUM 40 MG: 40 TABLET, DELAYED RELEASE ORAL at 10:02

## 2020-12-18 ASSESSMENT — PAIN SCALES - GENERAL
PAINLEVEL_OUTOF10: 0
PAINLEVEL_OUTOF10: 0

## 2020-12-18 ASSESSMENT — ENCOUNTER SYMPTOMS: SHORTNESS OF BREATH: 1

## 2020-12-18 NOTE — PROGRESS NOTES
Report given to Sherryle Malay. Call light and bedside table within reach. No needs voiced at this time. Bed in lowest position, brakes locked. Bed alarm on and in place.

## 2020-12-18 NOTE — PROGRESS NOTES
[facility]  Cardiology  Progress Note    Admission date:  2020    Reason for follow up visit: CHF    HPI/CC: Derek Xie is a 72 y.o. female who was admitted 2020 from [facility] for shortness of breath. Echo showed EF 55%, severe pulmonary HTN. Troponin elevated. Treated for CHF. Rhythm has been AF, converted to sinus bradycardia 40s this morning. Subjective: Feels tired but denies chest pain, shortness of breath, palpitations and dizziness. Vitals:  Blood pressure (!) 125/48, pulse 62, temperature 97.9 °F (36.6 °C), temperature source Oral, resp. rate 16, height 5' 5\" (1.651 m), weight 298 lb 12.8 oz (135.5 kg), SpO2 91 %.   Temp  Av.2 °F (36.8 °C)  Min: 97.8 °F (36.6 °C)  Max: 98.6 °F (37 °C)  Pulse  Av  Min: 57  Max: 92  BP  Min: 119/54  Max: 137/56  SpO2  Av.6 %  Min: 91 %  Max: 95 %    24 hour I/O    Intake/Output Summary (Last 24 hours) at 2020 1102  Last data filed at 2020 1016  Gross per 24 hour   Intake 970 ml   Output 6575 ml   Net -5605 ml     Current Facility-Administered Medications   Medication Dose Route Frequency Provider Last Rate Last Admin    miconazole (MICOTIN) 2 % powder   Topical BID Kel Eubanks MD   Given at 20 2215    magnesium oxide (MAG-OX) tablet 400 mg  400 mg Oral BID Shivam Roberson MD   400 mg at 20 220    aspirin EC tablet 81 mg  81 mg Oral Daily DANIELLE Garcia - CNP   81 mg at 20 3957    ketotifen (ZADITOR) 0.025 % ophthalmic solution 1 drop  1 drop Both Eyes BID Pierce Perera MD   1 drop at 20    metOLazone (ZAROXOLYN) tablet 10 mg  10 mg Oral BID Shivam Roberson MD   10 mg at 20 1002    spironolactone (ALDACTONE) tablet 50 mg  50 mg Oral BID Shivam Roberson MD   50 mg at 20 1002    magnesium sulfate 1 g in dextrose 5% 100 mL IVPB  1 g Intravenous PRN Kel Eubanks MD   Stopped at 20 1448    iron sucrose (VENOFER) 200 mg in sodium chloride 0.9 % 100 mL IVPB  200 mg Intravenous Q24H Guilherme Tolentino MD   Stopped at 12/17/20 1743    tetrahydrozoline 0.05 % ophthalmic solution 1 drop  1 drop Both Eyes Q4H PRN Sarah Easton MD   1 drop at 12/14/20 1401    potassium chloride (KLOR-CON M) extended release tablet 20 mEq  20 mEq Oral BID Arlie Hashimoto, MD   20 mEq at 12/17/20 2202    fluticasone (FLONASE) 50 MCG/ACT nasal spray 1 spray  1 spray Each Nostril Daily DANIELLE Farrell CNP   1 spray at 12/17/20 7687    apixaban (ELIQUIS) tablet 5 mg  5 mg Oral BID DANIELLE Farrell - CNP   5 mg at 12/18/20 1002    atorvastatin (LIPITOR) tablet 20 mg  20 mg Oral Daily DANIELLE Farrell - CNP   20 mg at 12/17/20 1233    insulin glargine (LANTUS) injection vial 15 Units  15 Units Subcutaneous Nightly DANIELLE Farrell - CNP   15 Units at 12/17/20 2202    levothyroxine (SYNTHROID) tablet 125 mcg  125 mcg Oral Daily Toby Cook APRN - CNP   125 mcg at 12/18/20 0749    pantoprazole (PROTONIX) tablet 40 mg  40 mg Oral Daily DANIELLE Farrell - CNP   40 mg at 12/18/20 1002    sertraline (ZOLOFT) tablet 125 mg  125 mg Oral Nightly Toby Cook APRN - CNP   125 mg at 12/17/20 2201    sodium chloride flush 0.9 % injection 10 mL  10 mL Intravenous 2 times per day DANIELLE Farrell - CNP   10 mL at 12/18/20 1003    sodium chloride flush 0.9 % injection 10 mL  10 mL Intravenous PRN Toby Cook APRN - COREY        acetaminophen (TYLENOL) tablet 650 mg  650 mg Oral Q6H PRN Toby Cook APRN - CNP   650 mg at 12/10/20 2124    Or    acetaminophen (TYLENOL) suppository 650 mg  650 mg Rectal Q6H PRN Toby Cook APRN - CNP        polyethylene glycol (GLYCOLAX) packet 17 g  17 g Oral Daily PRN Toby Cook APRN - CNP        promethazine (PHENERGAN) tablet 12.5 mg  12.5 mg Oral Q6H PRN DANIELLE Farrell CNP        Or    ondansetron (ZOFRAN) injection 4 mg  4 mg Intravenous Q6H PRN DANIELLE Farrell CNP        insulin lispro (HUMALOG) injection vial 0-12 Units  0-12 Units Subcutaneous TID WC DANIELLE Ann CNP   2 Units at 12/18/20 1001    insulin lispro (HUMALOG) injection vial 0-6 Units  0-6 Units Subcutaneous Nightly DANIELLE Ann CNP   1 Units at 12/17/20 2202    glucose (GLUTOSE) 40 % oral gel 15 g  15 g Oral PRN DANIELLE Ann CNP        dextrose 50 % IV solution  12.5 g Intravenous PRN DANIELLE Ann CNP        glucagon (rDNA) injection 1 mg  1 mg Intramuscular PRN DANIELLE Ann CNP        dextrose 5 % solution  100 mL/hr Intravenous PRN DANIELLE Ann CNP        furosemide (LASIX) 100 mg in dextrose 5 % 100 mL infusion  15 mg/hr Intravenous Continuous Buren MD Dale 15 mL/hr at 12/18/20 1004 15 mg/hr at 12/18/20 1004    perflutren lipid microspheres (DEFINITY) injection 1.65 mg  1.5 mL Intravenous ONCE PRN DANIELLE Ann CNP        traMADol (ULTRAM) tablet 25 mg  25 mg Oral Q6H PRN Onel Garibay MD   25 mg at 12/15/20 0702    tiZANidine (ZANAFLEX) tablet 4 mg  4 mg Oral BID Onel Garibay MD   4 mg at 12/18/20 1003    carboxymethylcellulose PF (THERATEARS) 1 % ophthalmic gel 1 drop  1 drop Both Eyes PRN Therese Flores MD   1 drop at 12/14/20 2209    sodium chloride flush 0.9 % injection 10 mL  10 mL Intravenous PRN Sanam Tierney PA-C   10 mL at 12/05/20 6018     Review of Systems   Constitutional: Positive for fatigue. Respiratory: Positive for shortness of breath. Cardiovascular: Positive for leg swelling. Negative for chest pain and palpitations. Neurological: Negative.       Objective:     Telemetry monitor: AF, now SB 40s with new TWI    Physical Exam:  Constitutional:  Comfortable and alert, NAD, appears older than stated age, obese  Eyes: PERRL, sclera nonicteric  Neck:  Supple, no masses, no thyroidmegaly, JVD difficult to assess due to body habitus  Skin:  Warm and dry; no rash or lesions  Heart: Regular, normal apex, S1 and S2 normal, no M/G/R  Lungs:  Normal respiratory effort; clear; no wheezing/rhonchi/rales  Abdomen: soft, non tender, + bowel sounds, obese  Extremities:  2+ BLE edema  Neuro: alert and oriented, moves legs and arms equally, normal mood and affect    Data Reviewed:    Echo 12/7/2020:   Limited only f/u for LVEF. Normal left ventricular systolic function with ejection fraction of 55-60%. No regional wall motion abnormalites are seen. Septal bounce due to right ventricular pressure and volume overload. The right ventricle is mildly enlarged. Right ventricular systolic function is moderately reduced . Moderate tricuspid regurgitation. Definity echo contrast used. Systolic pulmonic artery pressure (SPAP) is estimated at 67 mmHg consistent   with severe pulmonary hypertension (Right atrial pressure of 8 mmHg). The right atrium is moderately dilated.     Lab Reviewed:     Renal Profile:  Lab Results   Component Value Date    CREATININE 1.6 12/17/2020    BUN 36 12/17/2020     12/17/2020    K 4.1 12/17/2020    K 4.4 12/04/2020    CL 94 12/17/2020    CO2 34 12/17/2020     CBC:    Lab Results   Component Value Date    WBC 3.9 12/17/2020    RBC 3.15 12/17/2020    HGB 8.0 12/17/2020    HCT 25.3 12/17/2020    MCV 80.4 12/17/2020    RDW 17.2 12/17/2020     12/17/2020     BNP:    Lab Results   Component Value Date    PROBNP 3,574 12/04/2020    PROBNP 1,620 08/20/2020     Fasting Lipid Panel:    Lab Results   Component Value Date    CHOL 105 12/06/2020    HDL 58 12/06/2020    TRIG 54 12/06/2020     Cardiac Enzymes:  CK/MbTroponin  Lab Results   Component Value Date    TROPONINI 0.04 12/05/2020     PT/ INR   Lab Results   Component Value Date    INR 2.03 12/04/2020    INR 1.79 08/20/2020    PROTIME 23.7 12/04/2020    PROTIME 20.9 08/20/2020     PTT No results found for: PTT   Lab Results   Component Value Date    MG 1.60 12/17/2020    No results found for: TSH    All labs and imaging reviewed today    Assessment:  Elevated troponin  Sinus bradycardia: new, noted this morning   - HR does increase with minimal stimulation  Abnormal EKG: anterior TWI noted today  Acute on chronic diastolic CHF, right heart failure: improved, -35L and - 58 lbs (bed scales)   - dry weight felt to be about 220 lbs (298 lbs today)  Paroxysmal atrial fibrillation, formerly persistent: stable   - [id number] score > 2 on eliquis  CAD: no angina, s/p CABG x3 1995  Pulmonary HTN: severe on echo 12/7/2020  TR: moderate  HTN: stable  HLD  DM  A/CKD: nephrology following  Hypomagenesemia  Anemia: venofer started 12/14/2020  Obesity  History of CVA  Deconditioning  Noncompliance    Plan:   1. Repeat troponin  2. Limited echo   3. If troponin or echo significantly changed, will change eliquis to heparin gtt and plan for [facility] 12/21/2020; if stable, plan for stress test as initially planned  4. No beta blocker or ziyad agents due to bradycardia  5. Continue diuresis as renal function and BP allow; good progress thus far; on lasix gtt 15 mg/hr, spironolactone 50 mg BID, metolazone 10 mg BID  6. Continue aspirin, statin for CAD  7. Outpatient PAUL evaluation  8. Daily weights (standing scale), monitor BMP/CBC, I/Os  9.  Keep K>4, Mg >2    Discussed with Dr. Michael Wasserman, [address]  [phone]

## 2020-12-18 NOTE — PROGRESS NOTES
Interval hx:   Seen in room without family. Afebrile  Appetite fair. Diuresing . CC:  SOB ,volume overload. HPI: The patient is a 62-year-old  female with a past medical history of hypertension, hyperlipidemia, diabetes mellitus, morbid obesity, history of congestive heart failure, diastolic heart failure, coronary artery disease, atrial fibrillation, bilateral lower extremity edema previous history of cellulitis, and has had multiple admissions for weight gain and volume overload. She also has Ruiz Saliva cirrhosis/steatohepatitis. At the nursing home she is on multiple diuretics. She was last admitted on 12/5/2020 with volume overload and weight gain.   She tells me that her good weight is around 220 pounds    PMH:  Past Medical History:   Diagnosis Date    Anxiety     Arthritis     Atrial fibrillation (HCC)     CAD (coronary artery disease)     CHF (congestive heart failure) (HCC)     Chronic pain syndrome     [facility] [facility]     Depression     Diabetes mellitus ([facility]     GERD (gastroesophageal reflux disease)     Hyperlipidemia     Hypertension     Kidney disease     Thyroid disease     Vitamin D deficiency          Objective:     Vitals:   Vitals:    12/18/20 0402 12/18/20 0500 12/18/20 0745 12/18/20 1143   BP: (!) 119/54  (!) 125/48 (!) 128/50   Pulse: 57  62 60   Resp: 16  16 16   Temp: 98.2 °F (36.8 °C)  97.9 °F (36.6 °C) 97.9 °F (36.6 °C)   TempSrc: Axillary  Oral Oral   SpO2: 91%   90%   Weight:  298 lb 12.8 oz (135.5 kg)     Height:         REVIEW OF SYSTEMS:   POSITIVE ITEMS IN BOLD:  GEN:  fevers, chills, sweats, fatigue and weight loss   EYE: eyelid swelling, no redness  ENT:   nasal congestion, sore mouth and sore throat   Resp:   cough, hemoptysis, pneumonia or dyspnea on exertion   Card: chest pain, chest pressure/discomfort, dyspnea, palpitations,  lower extremity edema   GI: nausea, vomiting, diarrhea, constipation and abdominal pain   :  dysuria, nocturia, mg Oral BID     Continuous Infusions:   dextrose      furosemide (LASIX) 1mg/ml infusion 15 mg/hr (12/18/20 1004)     PRN Meds:perflutren lipid microspheres, magnesium sulfate, tetrahydrozoline, sodium chloride flush, acetaminophen **OR** acetaminophen, polyethylene glycol, promethazine **OR** ondansetron, glucose, dextrose, glucagon (rDNA), dextrose, perflutren lipid microspheres, traMADol, carboxymethylcellulose PF, sodium chloride flush    Diet:  DIET LOW SODIUM 2 GM; 1500 ml    CBC:   Recent Labs     12/17/20  0907   WBC 3.9*   HGB 8.0*   HCT 25.3*        BMP:    Recent Labs     12/16/20  0813 12/17/20  0907 12/18/20  1026    137 133*   K 4.0 4.1 4.1   CL 96* 94* 89*   CO2 32 34* 34*   BUN 36* 36* 37*   CREATININE 1.4* 1.6* 1.8*   GLUCOSE 130* 178* 261*   MG 1.70* 1.60* 1.80   PHOS 3.2 3.3 3.2     ABGs: No results found for: JHON YUSUF, [address] [facility]. Off: [phone]  Cell: [phone].  ( until 5 pm)

## 2020-12-18 NOTE — PROGRESS NOTES
12/18/2020 1249  Last data filed at 12/18/2020 1016  Gross per 24 hour   Intake 730 ml   Output 5550 ml   Net -4820 ml       Physical Exam Performed:    BP (!) 128/50   Pulse 60   Temp 97.9 °F (36.6 °C) (Oral)   Resp 16   Ht 5' 5\" (1.651 m)   Wt 298 lb 12.8 oz (135.5 kg) Comment: pt refused standing wt. RN aware  SpO2 90%   BMI 49.72 kg/m²     General appearance: No apparent distress, appears stated age and cooperative.  Pleasant  HEENT: Pupils equal, round, and reactive to light. Conjunctivae/corneas clear. Neck: Supple, with full range of motion. No jugular venous distention. Trachea midline. Respiratory:  Normal respiratory effort. Clear to auscultation, bilaterally without Wheezes/Rhonchi.    Cardiovascular: Regular rate and rhythm with normal S1/S2 without murmurs, rubs or gallops. Abdomen: Soft, non-tender, non-distended with normal bowel sounds. Obese abdomen  Musculoskeletal: No clubbing, cyanosis. 3+ lower extremity edema. Pitting. Similar to a couple days ago. Skin: Skin color, texture, turgor normal.  No rashes or lesions. Neurologic:  Neurovascularly intact without any focal sensory/motor deficits. Cranial nerves: II-XII intact, grossly non-focal.  Psychiatric: Alert and oriented, thought content appropriate, normal insight  Capillary Refill: Brisk,< 3 seconds   Peripheral Pulses: +2 palpable, equal bilaterally     I examined the patient today (12/18/20). Physical exam is similar to yesterday (12/17). Labs:   Recent Labs     12/17/20  0907   WBC 3.9*   HGB 8.0*   HCT 25.3*        Recent Labs     12/16/20  0813 12/17/20  0907 12/18/20  1026    137 133*   K 4.0 4.1 4.1   CL 96* 94* 89*   CO2 32 34* 34*   BUN 36* 36* 37*   CREATININE 1.4* 1.6* 1.8*   CALCIUM 10.4 10.8* 10.6   PHOS 3.2 3.3 3.2     No results for input(s): AST, ALT, BILIDIR, BILITOT, ALKPHOS in the last 72 hours. No results for input(s): INR in the last 72 hours.   No results for input(s): Arnaldo Diop outpatient. Overall, stable with continued diuresis and negative fluid balance. Acute kidney injury  Creatinine up to 1.8, as expected with aggressive diuresis. There might be a component of cardiorenal syndrome. Nephrology input appreciated  Monitoring daily basic metabolic panel    Paroxysmal atrial fibrillation  Heart rate is controlled and stable  On Eliquis for stroke prophylaxis    Diabetes mellitus type 2  Blood sugar higher than before. I will increase Lantus dose.     Chronic normocytic anemia  Iron studies are consistent with iron deficiency. Will receive a total of 1 g IV Venofer infusion. Orders written  No signs of active blood loss, but patient is on anticoagulation. Could have last blood slowly over period of time. Repeat CBC tomorrow    Dyslipidemia  Continue statin     Morbid obesity  Body mass index is 04.82 kg/m².  Complicating assessment and treatment. Placing patient at risk for multiple co-morbidities as well as early death and contributing to the patient's presentation. Counseled on weight loss. Noted BMI is probably inaccurate due to extensive edema. However, patient is undoubtedly overweight. Discussed with the patient, questions answered         DVT Prophylaxis: on eliquis  Diet: DIET LOW SODIUM 2 GM; 1500 ml  Code Status: Full Code    PT/OT Eval Status: Skilled nursing facility recommended.     Dispo -unclear date of discharge due to high residual fluid overload.   Continue to follow    Roseanna Orellana MD

## 2020-12-19 LAB
ALBUMIN SERPL-MCNC: 3.7 G/DL (ref 3.4–5)
ANION GAP SERPL CALCULATED.3IONS-SCNC: 9 MMOL/L (ref 3–16)
BASOPHILS ABSOLUTE: 0 K/UL (ref 0–0.2)
BASOPHILS RELATIVE PERCENT: 0.5 %
BUN BLDV-MCNC: 41 MG/DL (ref 7–20)
CALCIUM SERPL-MCNC: 10.6 MG/DL (ref 8.3–10.6)
CHLORIDE BLD-SCNC: 90 MMOL/L (ref 99–110)
CO2: 36 MMOL/L (ref 21–32)
CREAT SERPL-MCNC: 1.9 MG/DL (ref 0.6–1.2)
EOSINOPHILS ABSOLUTE: 0.1 K/UL (ref 0–0.6)
EOSINOPHILS RELATIVE PERCENT: 3.4 %
GFR AFRICAN AMERICAN: 32
GFR NON-AFRICAN AMERICAN: 26
GLUCOSE BLD-MCNC: 151 MG/DL (ref 70–99)
GLUCOSE BLD-MCNC: 158 MG/DL (ref 70–99)
GLUCOSE BLD-MCNC: 264 MG/DL (ref 70–99)
GLUCOSE BLD-MCNC: 279 MG/DL (ref 70–99)
GLUCOSE BLD-MCNC: 294 MG/DL (ref 70–99)
HCT VFR BLD CALC: 24.7 % (ref 36–48)
HEMOGLOBIN: 7.8 G/DL (ref 12–16)
LYMPHOCYTES ABSOLUTE: 0.5 K/UL (ref 1–5.1)
LYMPHOCYTES RELATIVE PERCENT: 11.9 %
MAGNESIUM: 2.2 MG/DL (ref 1.8–2.4)
MCH RBC QN AUTO: 25.6 PG (ref 26–34)
MCHC RBC AUTO-ENTMCNC: 31.6 G/DL (ref 31–36)
MCV RBC AUTO: 81.1 FL (ref 80–100)
MONOCYTES ABSOLUTE: 0.3 K/UL (ref 0–1.3)
MONOCYTES RELATIVE PERCENT: 8.3 %
NEUTROPHILS ABSOLUTE: 3.2 K/UL (ref 1.7–7.7)
NEUTROPHILS RELATIVE PERCENT: 75.9 %
PDW BLD-RTO: 17.3 % (ref 12.4–15.4)
PERFORMED ON: ABNORMAL
PHOSPHORUS: 3.3 MG/DL (ref 2.5–4.9)
PLATELET # BLD: 153 K/UL (ref 135–450)
PMV BLD AUTO: 7.9 FL (ref 5–10.5)
POTASSIUM SERPL-SCNC: 4.4 MMOL/L (ref 3.5–5.1)
RBC # BLD: 3.04 M/UL (ref 4–5.2)
SODIUM BLD-SCNC: 135 MMOL/L (ref 136–145)
WBC # BLD: 4.2 K/UL (ref 4–11)

## 2020-12-19 PROCEDURE — 6370000000 HC RX 637 (ALT 250 FOR IP): Performed by: NURSE PRACTITIONER

## 2020-12-19 PROCEDURE — 6370000000 HC RX 637 (ALT 250 FOR IP): Performed by: INTERNAL MEDICINE

## 2020-12-19 PROCEDURE — 36415 COLL VENOUS BLD VENIPUNCTURE: CPT

## 2020-12-19 PROCEDURE — 83735 ASSAY OF MAGNESIUM: CPT

## 2020-12-19 PROCEDURE — 80069 RENAL FUNCTION PANEL: CPT

## 2020-12-19 PROCEDURE — 6360000002 HC RX W HCPCS: Performed by: INTERNAL MEDICINE

## 2020-12-19 PROCEDURE — 2580000003 HC RX 258: Performed by: INTERNAL MEDICINE

## 2020-12-19 PROCEDURE — 85025 COMPLETE CBC W/AUTO DIFF WBC: CPT

## 2020-12-19 PROCEDURE — 6360000004 HC RX CONTRAST MEDICATION: Performed by: NURSE PRACTITIONER

## 2020-12-19 PROCEDURE — 1200000000 HC SEMI PRIVATE

## 2020-12-19 PROCEDURE — 99232 SBSQ HOSP IP/OBS MODERATE 35: CPT | Performed by: INTERNAL MEDICINE

## 2020-12-19 PROCEDURE — C8923 2D TTE W OR W/O FOL W/CON,CO: HCPCS

## 2020-12-19 RX ADMIN — FLUTICASONE PROPIONATE 1 SPRAY: 50 SPRAY, METERED NASAL at 09:27

## 2020-12-19 RX ADMIN — FUROSEMIDE 15 MG/HR: 10 INJECTION, SOLUTION INTRAMUSCULAR; INTRAVENOUS at 08:22

## 2020-12-19 RX ADMIN — PERFLUTREN 1.65 MG: 6.52 INJECTION, SUSPENSION INTRAVENOUS at 12:21

## 2020-12-19 RX ADMIN — MICONAZOLE NITRATE: 20 POWDER TOPICAL at 09:27

## 2020-12-19 RX ADMIN — LEVOTHYROXINE SODIUM 125 MCG: 0.12 TABLET ORAL at 09:26

## 2020-12-19 RX ADMIN — ASPIRIN 81 MG: 81 TABLET, COATED ORAL at 09:26

## 2020-12-19 RX ADMIN — INSULIN LISPRO 6 UNITS: 100 INJECTION, SOLUTION INTRAVENOUS; SUBCUTANEOUS at 12:19

## 2020-12-19 RX ADMIN — SPIRONOLACTONE 50 MG: 25 TABLET ORAL at 18:07

## 2020-12-19 RX ADMIN — APIXABAN 5 MG: 5 TABLET, FILM COATED ORAL at 09:26

## 2020-12-19 RX ADMIN — INSULIN GLARGINE 20 UNITS: 100 INJECTION, SOLUTION SUBCUTANEOUS at 20:47

## 2020-12-19 RX ADMIN — INSULIN LISPRO 3 UNITS: 100 INJECTION, SOLUTION INTRAVENOUS; SUBCUTANEOUS at 20:47

## 2020-12-19 RX ADMIN — TIZANIDINE 4 MG: 4 TABLET ORAL at 09:26

## 2020-12-19 RX ADMIN — INSULIN LISPRO 6 UNITS: 100 INJECTION, SOLUTION INTRAVENOUS; SUBCUTANEOUS at 18:07

## 2020-12-19 RX ADMIN — POTASSIUM CHLORIDE 20 MEQ: 20 TABLET, EXTENDED RELEASE ORAL at 20:43

## 2020-12-19 RX ADMIN — ATORVASTATIN CALCIUM 20 MG: 10 TABLET, FILM COATED ORAL at 09:26

## 2020-12-19 RX ADMIN — PROMETHAZINE HYDROCHLORIDE 12.5 MG: 25 TABLET ORAL at 00:01

## 2020-12-19 RX ADMIN — MAGNESIUM GLUCONATE 500 MG ORAL TABLET 400 MG: 500 TABLET ORAL at 20:42

## 2020-12-19 RX ADMIN — INSULIN LISPRO 2 UNITS: 100 INJECTION, SOLUTION INTRAVENOUS; SUBCUTANEOUS at 09:28

## 2020-12-19 RX ADMIN — MAGNESIUM GLUCONATE 500 MG ORAL TABLET 400 MG: 500 TABLET ORAL at 09:26

## 2020-12-19 RX ADMIN — PANTOPRAZOLE SODIUM 40 MG: 40 TABLET, DELAYED RELEASE ORAL at 09:26

## 2020-12-19 RX ADMIN — SERTRALINE HYDROCHLORIDE 125 MG: 50 TABLET ORAL at 20:42

## 2020-12-19 RX ADMIN — APIXABAN 5 MG: 5 TABLET, FILM COATED ORAL at 20:42

## 2020-12-19 RX ADMIN — MICONAZOLE NITRATE: 20 POWDER TOPICAL at 20:46

## 2020-12-19 RX ADMIN — SPIRONOLACTONE 50 MG: 25 TABLET ORAL at 09:26

## 2020-12-19 RX ADMIN — POTASSIUM CHLORIDE 20 MEQ: 20 TABLET, EXTENDED RELEASE ORAL at 09:26

## 2020-12-19 RX ADMIN — KETOTIFEN FUMARATE 1 DROP: 0.35 SOLUTION/ DROPS OPHTHALMIC at 10:45

## 2020-12-19 RX ADMIN — FUROSEMIDE 15 MG/HR: 10 INJECTION, SOLUTION INTRAMUSCULAR; INTRAVENOUS at 14:35

## 2020-12-19 RX ADMIN — TIZANIDINE 4 MG: 4 TABLET ORAL at 20:43

## 2020-12-19 RX ADMIN — KETOTIFEN FUMARATE 1 DROP: 0.35 SOLUTION/ DROPS OPHTHALMIC at 20:46

## 2020-12-19 ASSESSMENT — PAIN SCALES - GENERAL
PAINLEVEL_OUTOF10: 3

## 2020-12-19 ASSESSMENT — PAIN DESCRIPTION - PAIN TYPE: TYPE: ACUTE PAIN

## 2020-12-19 ASSESSMENT — PAIN DESCRIPTION - LOCATION: LOCATION: NECK

## 2020-12-19 NOTE — PROGRESS NOTES
Cardiology Progress Note     Admit Date: 2020     Reason for follow up: Shortness of breath    Interval History:   - Patient seen and examined. - Clinical notes reviewed. - Telemetry reviewed. Sinus rhythm with PVCS. - No new complaints today. - No major events overnight. - Shortness of breath improved but still present. Physical Examination:  Vitals:    20 0811   BP: (!) 113/44   Pulse: 50   Resp: 16   Temp: 97.9 °F (36.6 °C)   SpO2: 94%        Intake/Output Summary (Last 24 hours) at 2020 1134  Last data filed at 2020 1015  Gross per 24 hour   Intake 240 ml   Output 3050 ml   Net -2810 ml     In: 240 [P.O.:240]  Out: 3050    Wt Readings from Last 3 Encounters:   20 (!) 302 lb 3.2 oz (137.1 kg)   20 242 lb (109.8 kg)     Temp  Av.9 °F (36.6 °C)  Min: 97.3 °F (36.3 °C)  Max: 98.4 °F (36.9 °C)  Pulse  Av.8  Min: 50  Max: 96  BP  Min: 113/44  Max: 146/51  SpO2  Av.8 %  Min: 90 %  Max: 94 %    · Telemetry: Sinus rhythm with PVCs. · Constitutional: Alert. Oriented to person, place, and time. No distress. · Head: Normocephalic and atraumatic. · Cardiovascular: Normal rate, regular rhythm. Normal S1&S2. PVCs noted. · Pulmonary/Chest: Bilateral respiratory sounds present. No respiratory accessory muscle use. · Abdominal: Soft. Normal bowel sounds present. No distension, No tenderness. · Musculoskeletal: No tenderness. No edema    · Neurological: Alert and oriented. Cranial nerve II-XII grossly intact. · Skin: Skin is warm and dry. No rash, lesions, ulcerations noted. · Psychiatric: No anxiety nor agitation. Labs, diagnostic and imaging results reviewed. Reviewed.    Recent Labs     20  0907 20  1026 20  0821    133* 135*   K 4.1 4.1 4.4   CL 94* 89* 90*   CO2 34* 34* 36*   PHOS 3.3 3.2 3.3   BUN 36* 37* 41*   CREATININE 1.6* 1.8* 1.9*     Recent Labs     20  0907 20  0821   WBC 3.9* 4.2   HGB 8.0* 7.8* HCT 25.3* 24.7*   MCV 80.4 81.1    153     Lab Results   Component Value Date    TROPONINI 0.05 12/18/2020     Estimated Creatinine Clearance: 39 mL/min (A) (based on SCr of 1.9 mg/dL (H)).    No results found for: BNP  Lab Results   Component Value Date    PROTIME 23.7 12/04/2020    PROTIME 20.9 08/20/2020    INR 2.03 12/04/2020    INR 1.79 08/20/2020     Lab Results   Component Value Date    CHOL 105 12/06/2020    HDL 58 12/06/2020    TRIG 54 12/06/2020       Scheduled Meds:   insulin glargine  20 Units Subcutaneous Nightly    miconazole   Topical BID    magnesium oxide  400 mg Oral BID    aspirin  81 mg Oral Daily    ketotifen  1 drop Both Eyes BID    metOLazone  10 mg Oral BID    spironolactone  50 mg Oral BID    potassium chloride  20 mEq Oral BID    fluticasone  1 spray Each Nostril Daily    apixaban  5 mg Oral BID    atorvastatin  20 mg Oral Daily    levothyroxine  125 mcg Oral Daily    pantoprazole  40 mg Oral Daily    sertraline  125 mg Oral Nightly    sodium chloride flush  10 mL Intravenous 2 times per day    insulin lispro  0-12 Units Subcutaneous TID WC    insulin lispro  0-6 Units Subcutaneous Nightly    tiZANidine  4 mg Oral BID     Continuous Infusions:   dextrose      furosemide (LASIX) 1mg/ml infusion 15 mg/hr (12/19/20 0822)     PRN Meds:perflutren lipid microspheres, magnesium sulfate, tetrahydrozoline, sodium chloride flush, acetaminophen **OR** acetaminophen, polyethylene glycol, promethazine **OR** ondansetron, glucose, dextrose, glucagon (rDNA), dextrose, perflutren lipid microspheres, traMADol, carboxymethylcellulose PF, sodium chloride flush     Patient Active Problem List    Diagnosis Date Noted    Acute on chronic diastolic CHF (congestive heart failure) ([facility] [address] 12/06/2020    Morbid obesity ([facility] [address] 12/05/2020    Fluid overload 12/05/2020    CHF (congestive heart failure) (HCC)     CAD (coronary artery disease)     Hyperkalemia 08/20/2020    [facility] 08/20/2020    Stage 3 chronic kidney disease 08/20/2020    Atrial fibrillation with slow ventricular response ([facility] [address] 08/20/2020    DIONNE (acute kidney injury) ([facility] 08/20/2020    Type 2 diabetes mellitus, with long-term current use of insulin ([facility] [address] 08/20/2020    Essential hypertension 08/20/2020    GERD (gastroesophageal reflux disease) 08/20/2020    Dementia ([facility] 08/20/2020    Acquired hypothyroidism 08/20/2020      Active Hospital Problems    Diagnosis Date Noted    Acute on chronic diastolic CHF (congestive heart failure) ([facility] [address] [I50.33] 12/06/2020    Morbid obesity ([facility] [E66.01] 12/05/2020    Fluid overload [E87.70] 12/05/2020    CHF (congestive heart failure) (HCC) [I50.9]     CAD (coronary artery disease) [I25.10]     Acute kidney injury superimposed on chronic kidney disease ([facility] [address] [N17.9, N18.9] 08/20/2020    Essential hypertension [I10] 08/20/2020    Type 2 diabetes mellitus, with long-term current use of insulin (HCC) [E11.9, Z79.4] 08/20/2020    Stage 3 chronic kidney disease [N18.30] 08/20/2020     Mrs. Annabelle Thomason is a pleasant 44-year-old female with a complicated medical history significant for ischemic cardiomyopathy status post CABG (~1995), paroxysmal atrial fibrillation, chronic renal insufficiency stage III, heart failure preserved ejection fraction, hypothyroidism, hypertension, diabetes mellitus type 2, and hyperlipidemia who presented from home with acute on chronic heart failure. Assessment and Plan:   1. Ischemic cardiomyopathy status post CABG (1995). Patient is a pleasant 44-year-old female with a complicated medical history significant for ischemic cardiomyopathy status post CABG in 1995, paroxysmal atrial fibrillation on apixaban, chronic renal insufficiency, hypothyroidism, hypertension, diabetes mellitus type 2, and hyperlipidemia who presents from home with acute on chronic heart failure.   She is currently being diuresed with IV furosemide. Nephrology is on board. Plan was to check her troponin enzymes and see if they are trending upward however they are currently stable at 0.05. Her echocardiogram is pending today (we should have resolved today). If echo shows any wall motion normalities we will plan for left heart catheterization if not we will plan for stress test.  - Follow up echocardiogram.  - Continue current course. - I will hold metolazone per nephrology recommendations. Appreciate nephrology's assistance. 2. Heart failure with preserved ejection fraction.  - Plan as per above. Will try and void LHC given worsening renal function. - I will hold metolazone per nephrology recommendations. 3. Acute on chronic renal failure (baseline Cr ~1.2). - Plan as per above. 4. Paroxysmal atrial fibrillation. Patient with a [id number] score of 5. Currently in normal sinus rhythm though did have some reported bradycardia on admission. Not on ziyad agents however may need. - Continue apixaban pending results of echocardiogram.  - Continue to monitor for atrial fibrillation in which case she may need ziyad agent. - Two week monitor at time of discharge to look for atrial fibrillation with RVR. Thank you for allowing me to participate in the care of this patient. If you have any questions, please do not hesitate to contact me.     Reji Mtz MD  Cardiac Electrophysiology  [address] [facility]  [phone] [facility]

## 2020-12-19 NOTE — PROGRESS NOTES
[facility]                                                 [phone]                                      [facility] [employer]                                        Inpatient Progress note    Summary:   Elton Lee is being seen by nephrology for DIONNE on CKD. Admitted with SOB. 71-year-old  female with a past medical history of hypertension, hyperlipidemia, diabetes mellitus, morbid obesity, history of congestive heart failure, diastolic heart failure, coronary artery disease, atrial fibrillation, bilateral lower extremity edema previous history of cellulitis, and has had multiple admissions for weight gain and volume overload. Interval History  Seen and examined at bedside. Says she feels tired, breathing is about the same. Not worse. Has some improvement in her swelling. Has mora in place. /61  91%on room air.  >309 >298 >302  UO 3 L yesterday  Negative 2.9 L if IO correct   Na 133 > 135   K 4.4  Bicarb 34 > 36  BUN 37 > 41  Cr 1.8 > 1.9     Plan:   - continue diuresis, metolazone held. - lasix gtt per cardiology  - strict Io and weights. - Cr bumped a little, likely due to diuresis. [facility] thanks you for the opportunity to serve this patient. Please call with any questions of concerns. Romeo Clark MD  [facility]  [address], [address]  Fax: [phone]  Office: [phone]    Assessment:   Generalized anasarca/massive volume overload. Tells me her weight is 220 pounds dry. Currently is 335 pounds. Started on Lasix drip I will add metolazone. Watch for hypokalemia. She may get alkalotic may need to use some doses of albumin. I will also start the patient on Aldactone for the time being for potassium conservation.   Last admission she was down to 324 pounds at discharge.        Acute Kidney Injury on CKD III:      DIONNE likely due to - cardiorenal syndrome, r/o urinary tract infection, also has cirrhosis appears normal, bilateral equal chest rise  Cardiovascular: Ausculation shows RR, ++ edema  Abdomen: No visible mass or tenderness, non distended. Musculoskeletal:  Joints with no swelling or deformity. Strength grossly normal  Skin:no rashes, ulcers, induration, no jaundice. Neuro: face symmetric, no focal deficits. Appropriate responses.  CN 2-12 grossly intact      Lab Results   Component Value Date    CREATININE 1.9 (H) 12/19/2020    BUN 41 (H) 12/19/2020     (L) 12/19/2020    K 4.4 12/19/2020    CL 90 (L) 12/19/2020    CO2 36 (H) 12/19/2020      Lab Results   Component Value Date    WBC 4.2 12/19/2020    HGB 7.8 (L) 12/19/2020    HCT 24.7 (L) 12/19/2020    MCV 81.1 12/19/2020     12/19/2020     Lab Results   Component Value Date    CALCIUM 10.6 12/19/2020    PHOS 3.3 12/19/2020

## 2020-12-19 NOTE — PROGRESS NOTES
Physical Therapy  Attempted to see pt at this time for PT f/u, pt currently asleep in bed but easily aroused. Pt declines participation in PT this am, slightly agitated on attempt. \"I'm trying to sleep\". Pt agreeable to PT re-attempt at later date. Will re-attempt as schedule permits.  Thanks  Jose Sanchez PT, DPT

## 2020-12-19 NOTE — PROGRESS NOTES
Hospitalist Progress Note      PCP: Nanette Ennis MD    Date of Admission: 12/4/2020       Chief Complaint: Bilateral leg edema and weight gain    Hospital course: Admitted with edema. Bolus Lasix did not work. Currently on Lasix drip. Also known to have underlying cirrhosis. Seen by cardiology and nephrology. Lasix drip is currently at 15 mg/h. Norvasc stopped 2 days ago. Feels slightly better than a couple days ago. No chest pain. Has shortness of breath with exertion, but improving.   Bradycardia noted overnight.      Subjective: No chest pain, no shortness of breath at rest, no nausea or vomiting at rest.  Subjectively, nothing new overnight.       Medications:  Reviewed    Infusion Medications    dextrose      furosemide (LASIX) 1mg/ml infusion 15 mg/hr (12/19/20 0822)     Scheduled Medications    insulin glargine  20 Units Subcutaneous Nightly    miconazole   Topical BID    magnesium oxide  400 mg Oral BID    aspirin  81 mg Oral Daily    ketotifen  1 drop Both Eyes BID    metOLazone  10 mg Oral BID    spironolactone  50 mg Oral BID    potassium chloride  20 mEq Oral BID    fluticasone  1 spray Each Nostril Daily    apixaban  5 mg Oral BID    atorvastatin  20 mg Oral Daily    levothyroxine  125 mcg Oral Daily    pantoprazole  40 mg Oral Daily    sertraline  125 mg Oral Nightly    sodium chloride flush  10 mL Intravenous 2 times per day    insulin lispro  0-12 Units Subcutaneous TID WC    insulin lispro  0-6 Units Subcutaneous Nightly    tiZANidine  4 mg Oral BID     PRN Meds: perflutren lipid microspheres, magnesium sulfate, tetrahydrozoline, sodium chloride flush, acetaminophen **OR** acetaminophen, polyethylene glycol, promethazine **OR** ondansetron, glucose, dextrose, glucagon (rDNA), dextrose, perflutren lipid microspheres, traMADol, carboxymethylcellulose PF, sodium chloride flush      Intake/Output Summary (Last 24 hours) at 12/19/2020 1005  Last data filed at 12/19/2020 0530  Gross per 24 hour   Intake 120 ml   Output 3050 ml   Net -2930 ml       Physical Exam Performed:    BP (!) 113/44   Pulse 50   Temp 97.9 °F (36.6 °C) (Oral)   Resp 16   Ht 5' 5\" (1.651 m)   Wt (!) 302 lb 3.2 oz (137.1 kg)   SpO2 94%   BMI 50.29 kg/m²     General appearance: No apparent distress, appears stated age and cooperative.  Pleasant  HEENT: Pupils equal, round, and reactive to light. Conjunctivae/corneas clear. Neck: Supple, with full range of motion. No jugular venous distention. Trachea midline. Respiratory:  Normal respiratory effort. Clear to auscultation, bilaterally without Wheezes/Rhonchi.    Cardiovascular: Regular rate and rhythm with normal S1/S2 without murmurs, rubs or gallops. Abdomen: Soft, non-tender, non-distended with normal bowel sounds. Obese abdomen  Musculoskeletal: No clubbing, cyanosis. 2+ lower extremity edema. Pitting. Similar to a couple days ago. Skin: Skin color, texture, turgor normal.  No rashes or lesions. Neurologic:  Neurovascularly intact without any focal sensory/motor deficits. Cranial nerves: II-XII intact, grossly non-focal.  Psychiatric: Alert and oriented, thought content appropriate, normal insight  Capillary Refill: Brisk,< 3 seconds   Peripheral Pulses: +2 palpable, equal bilaterally     I examined the patient today (12/19/20). Physical exam is similar to yesterday (12/18) with the exception of a slightly improved lower extremity edema. Labs:   Recent Labs     12/17/20  0907 12/19/20  0821   WBC 3.9* 4.2   HGB 8.0* 7.8*   HCT 25.3* 24.7*    153     Recent Labs     12/17/20  0907 12/18/20  1026 12/19/20  0821    133* 135*   K 4.1 4.1 4.4   CL 94* 89* 90*   CO2 34* 34* 36*   BUN 36* 37* 41*   CREATININE 1.6* 1.8* 1.9*   CALCIUM 10.8* 10.6 10.6   PHOS 3.3 3.2 3.3     No results for input(s): AST, ALT, BILIDIR, BILITOT, ALKPHOS in the last 72 hours. No results for input(s): INR in the last 72 hours.   Recent Labs 12/18/20  1544   TROPONINI 0.05*       Urinalysis:      Lab Results   Component Value Date    NITRU Negative 12/05/2020    WBCUA 3-5 12/05/2020    BACTERIA 2+ 08/20/2020    RBCUA 5-10 12/05/2020    BLOODU SMALL 12/05/2020    SPECGRAV 1.025 12/05/2020    GLUCOSEU Negative 12/05/2020       Radiology:  CT ABDOMEN PELVIS WO CONTRAST Additional Contrast? None   Final Result   1. Findings of diffuse volume overload including abdominal and pelvic   ascites, anasarca and findings of congestive heart failure in the lower lungs. 2. Hepatic morphologic features of cirrhosis. No gross hepatic lesion   evident. 3. Splenomegaly, typically associated with portal venous hypertension but   nonspecific. 4. Mild right upper quadrant lymph node enlargement is very likely reactive. XR CHEST PORTABLE   Final Result   Vascular congestion. No acute pulmonary disease. Stable enlargement of cardiac silhouette. Assessment/Plan:    Active Hospital Problems    Diagnosis    Acute on chronic diastolic CHF (congestive heart failure) ([facility]) [I50.33]    Morbid obesity ([facility] [address] [E66.01]    Fluid overload [E87.70]    CHF (congestive heart failure) ([facility]) [I50.9]    CAD (coronary artery disease) [I25.10]    Acute kidney injury superimposed on chronic kidney disease ([facility] [address] [N17.9, N18.9]    Essential hypertension [I10]    Type 2 diabetes mellitus, with long-term current use of insulin ([facility]) [E11.9, Z79.4]    Stage 3 chronic kidney disease [N18.30]     PLAN:        Acute on chronic diastolic CHF  Edema also complicated by cirrhosis. Continue furosemide drip and spironolactone. Metolazone also added by nephrology. Combination diuretics appear to be effective. Remains off Norvasc, most likely permanently. Furosemide drip rate is currently 15 mg/h  Cardiology opinion appreciated  Patient admits to past noncompliance.   Does not want to go to bathroom often and either skips or refuses diuretics frequently. Evaluated by cardiology. Likely to require ischemic evaluation once stable. Possibly as outpatient. Overall, stable with continued diuresis and negative fluid balance. Bradycardia  Not on negative chronotropic medications  Continue to monitor on telemetry. Current heart rate is 50 bpm.    Acute kidney injury  Creatinine up to 1.9 today, as expected with aggressive diuresis. Not critical  Nephrology input appreciated  Monitoring daily basic metabolic panel    Paroxysmal atrial fibrillation  Heart rate is controlled and stable  On Eliquis for stroke prophylaxis    Diabetes mellitus type 2  Last 2 blood sugar readings are acceptable. Continue with same dose of insulin.     Chronic normocytic anemia  Iron studies are consistent with iron deficiency. Will receive a total of 1 g IV Venofer infusion. Orders written  No signs of active blood loss, but patient is on anticoagulation. Could have last blood slowly over period of time. Today's hemoglobin is 7.8. No need for transfusion. Receiving IV iron    Dyslipidemia  Continue statin     Morbid obesity  Body mass index is 54.91 kg/m².  Complicating assessment and treatment. Placing patient at risk for multiple co-morbidities as well as early death and contributing to the patient's presentation. Counseled on weight loss. Noted BMI is probably inaccurate due to extensive edema. However, patient is undoubtedly overweight. Discussed with the patient, questions answered         DVT Prophylaxis: on eliquis  Diet: DIET LOW SODIUM 2 GM; 1500 ml  Code Status: Full Code    PT/OT Eval Status: Skilled nursing facility recommended.     Dispo -unclear date of discharge due to high residual fluid overload.   Continue to follow    Shanti Arevalo MD

## 2020-12-20 LAB
ALBUMIN SERPL-MCNC: 3.8 G/DL (ref 3.4–5)
ANION GAP SERPL CALCULATED.3IONS-SCNC: 10 MMOL/L (ref 3–16)
BUN BLDV-MCNC: 47 MG/DL (ref 7–20)
CALCIUM SERPL-MCNC: 10.9 MG/DL (ref 8.3–10.6)
CHLORIDE BLD-SCNC: 90 MMOL/L (ref 99–110)
CO2: 35 MMOL/L (ref 21–32)
CREAT SERPL-MCNC: 2.4 MG/DL (ref 0.6–1.2)
GFR AFRICAN AMERICAN: 24
GFR NON-AFRICAN AMERICAN: 20
GLUCOSE BLD-MCNC: 165 MG/DL (ref 70–99)
GLUCOSE BLD-MCNC: 173 MG/DL (ref 70–99)
GLUCOSE BLD-MCNC: 184 MG/DL (ref 70–99)
GLUCOSE BLD-MCNC: 264 MG/DL (ref 70–99)
GLUCOSE BLD-MCNC: 268 MG/DL (ref 70–99)
MAGNESIUM: 1.9 MG/DL (ref 1.8–2.4)
PERFORMED ON: ABNORMAL
PHOSPHORUS: 3.1 MG/DL (ref 2.5–4.9)
POTASSIUM SERPL-SCNC: 4.3 MMOL/L (ref 3.5–5.1)
SODIUM BLD-SCNC: 135 MMOL/L (ref 136–145)

## 2020-12-20 PROCEDURE — 6370000000 HC RX 637 (ALT 250 FOR IP): Performed by: INTERNAL MEDICINE

## 2020-12-20 PROCEDURE — 2580000003 HC RX 258: Performed by: NURSE PRACTITIONER

## 2020-12-20 PROCEDURE — 2580000003 HC RX 258: Performed by: INTERNAL MEDICINE

## 2020-12-20 PROCEDURE — 36415 COLL VENOUS BLD VENIPUNCTURE: CPT

## 2020-12-20 PROCEDURE — 6370000000 HC RX 637 (ALT 250 FOR IP): Performed by: NURSE PRACTITIONER

## 2020-12-20 PROCEDURE — 80069 RENAL FUNCTION PANEL: CPT

## 2020-12-20 PROCEDURE — 99232 SBSQ HOSP IP/OBS MODERATE 35: CPT | Performed by: INTERNAL MEDICINE

## 2020-12-20 PROCEDURE — 1200000000 HC SEMI PRIVATE

## 2020-12-20 PROCEDURE — 6360000002 HC RX W HCPCS: Performed by: INTERNAL MEDICINE

## 2020-12-20 PROCEDURE — 83735 ASSAY OF MAGNESIUM: CPT

## 2020-12-20 PROCEDURE — 97110 THERAPEUTIC EXERCISES: CPT

## 2020-12-20 PROCEDURE — 97116 GAIT TRAINING THERAPY: CPT

## 2020-12-20 RX ADMIN — MAGNESIUM GLUCONATE 500 MG ORAL TABLET 400 MG: 500 TABLET ORAL at 08:51

## 2020-12-20 RX ADMIN — INSULIN GLARGINE 20 UNITS: 100 INJECTION, SOLUTION SUBCUTANEOUS at 21:26

## 2020-12-20 RX ADMIN — ATORVASTATIN CALCIUM 20 MG: 10 TABLET, FILM COATED ORAL at 08:51

## 2020-12-20 RX ADMIN — INSULIN LISPRO 3 UNITS: 100 INJECTION, SOLUTION INTRAVENOUS; SUBCUTANEOUS at 21:26

## 2020-12-20 RX ADMIN — TIZANIDINE 4 MG: 4 TABLET ORAL at 08:51

## 2020-12-20 RX ADMIN — POTASSIUM CHLORIDE 20 MEQ: 20 TABLET, EXTENDED RELEASE ORAL at 21:26

## 2020-12-20 RX ADMIN — POTASSIUM CHLORIDE 20 MEQ: 20 TABLET, EXTENDED RELEASE ORAL at 08:51

## 2020-12-20 RX ADMIN — PANTOPRAZOLE SODIUM 40 MG: 40 TABLET, DELAYED RELEASE ORAL at 08:51

## 2020-12-20 RX ADMIN — SERTRALINE HYDROCHLORIDE 125 MG: 50 TABLET ORAL at 21:26

## 2020-12-20 RX ADMIN — LEVOTHYROXINE SODIUM 125 MCG: 0.12 TABLET ORAL at 06:27

## 2020-12-20 RX ADMIN — FUROSEMIDE 15 MG/HR: 10 INJECTION, SOLUTION INTRAMUSCULAR; INTRAVENOUS at 03:56

## 2020-12-20 RX ADMIN — MICONAZOLE NITRATE: 20 POWDER TOPICAL at 08:52

## 2020-12-20 RX ADMIN — KETOTIFEN FUMARATE 1 DROP: 0.35 SOLUTION/ DROPS OPHTHALMIC at 21:27

## 2020-12-20 RX ADMIN — FLUTICASONE PROPIONATE 1 SPRAY: 50 SPRAY, METERED NASAL at 08:52

## 2020-12-20 RX ADMIN — APIXABAN 2.5 MG: 2.5 TABLET, FILM COATED ORAL at 21:26

## 2020-12-20 RX ADMIN — INSULIN LISPRO 6 UNITS: 100 INJECTION, SOLUTION INTRAVENOUS; SUBCUTANEOUS at 13:08

## 2020-12-20 RX ADMIN — MAGNESIUM GLUCONATE 500 MG ORAL TABLET 400 MG: 500 TABLET ORAL at 21:25

## 2020-12-20 RX ADMIN — KETOTIFEN FUMARATE 1 DROP: 0.35 SOLUTION/ DROPS OPHTHALMIC at 08:52

## 2020-12-20 RX ADMIN — SPIRONOLACTONE 50 MG: 25 TABLET ORAL at 17:19

## 2020-12-20 RX ADMIN — APIXABAN 5 MG: 5 TABLET, FILM COATED ORAL at 08:51

## 2020-12-20 RX ADMIN — MICONAZOLE NITRATE: 20 POWDER TOPICAL at 21:27

## 2020-12-20 RX ADMIN — INSULIN LISPRO 2 UNITS: 100 INJECTION, SOLUTION INTRAVENOUS; SUBCUTANEOUS at 08:54

## 2020-12-20 RX ADMIN — ASPIRIN 81 MG: 81 TABLET, COATED ORAL at 08:51

## 2020-12-20 RX ADMIN — SPIRONOLACTONE 50 MG: 25 TABLET ORAL at 08:51

## 2020-12-20 RX ADMIN — TIZANIDINE 4 MG: 4 TABLET ORAL at 21:26

## 2020-12-20 RX ADMIN — INSULIN LISPRO 2 UNITS: 100 INJECTION, SOLUTION INTRAVENOUS; SUBCUTANEOUS at 18:08

## 2020-12-20 RX ADMIN — SODIUM CHLORIDE, PRESERVATIVE FREE 10 ML: 5 INJECTION INTRAVENOUS at 21:26

## 2020-12-20 ASSESSMENT — PAIN SCALES - GENERAL
PAINLEVEL_OUTOF10: 0

## 2020-12-20 NOTE — PROGRESS NOTES
[facility]                                                 [phone]                                      [facility] [employer]                                        Inpatient Progress note    Summary:   Tiffany Teran is being seen by nephrology for DIONNE on CKD. Admitted with SOB. 78-year-old  female with a past medical history of hypertension, hyperlipidemia, diabetes mellitus, morbid obesity, history of congestive heart failure, diastolic heart failure, coronary artery disease, atrial fibrillation, bilateral lower extremity edema previous history of cellulitis, and has had multiple admissions for weight gain and volume overload. Interval History  Seen and examined at bedside.had no complaint this AM. Just fatigued. her breathing is comfortable but has to sit up in bed. Edema still there but improved    /67  90%on room air. On lasix at 15 mg/hr   >309 >298 >302  > 271 ? UO 4.4 L   Negative 2.8 L   Na 133 > 135 > 135  K 4.4 > 4.3  Bicarb 34 > 36 > 35  BUN 37 > 41 > 47  Cr 1.8 > 1.9 > 2.4    Plan:   - would recommend slowing diuresis given DIONNE. - lasix gtt per cardiology  - strict Io and weights. [facility] thanks you for the opportunity to serve this patient. Please call with any questions of concerns. Apolonia Suh MD  [facility]  [address], [address]  Fax: [phone]  Office: [phone]    Assessment:   Generalized anasarca/massive volume overload. Tells me her weight is 220 pounds dry. Currently is 335 pounds. Started on Lasix drip I will add metolazone. Watch for hypokalemia. She may get alkalotic may need to use some doses of albumin. I will also start the patient on Aldactone for the time being for potassium conservation.   Last admission she was down to 324 pounds at discharge.        Acute Kidney Injury on CKD III:      DIONNE likely due to - cardiorenal syndrome, r/o urinary tract infection, also has cirrhosis of the liver with decompensation  Cr on consultation 1.3   Baseline Cr-1.5 with wide fluctuations. from 4/9  She is followed by Dr. Rupa Hutson follow-up was 5/19     UA trace blood ,  otherwise bland  Renal Imaging:- 8/20- no hydronephrosis, no sig abnormality  Echo: 11/18-EF normal moderate TR     Hypertension   BP goal inpatient [phone] systolic inpatient  Known to have atrial fibrillation     Cirrhosis of the liver-with portal  Diabetes mellitus on insulin  Hyperuricemia      DM   Morbid obesity. History of A. fib. Patient history of medication noncompliance. Patient is a full code.     ROS:     Positives Listed Bold. All other remaining systems are negative. Constitutional:  fever, chills, weakness, weight change, fatigue,      Skin:  rash, pruritus, hair loss, bruising, dry skin, petechiae. Head, Face, Neck   headaches, swelling,  cervical adenopathy. Respiratory: shortness of breath, cough, or wheezing  Cardiovascular: chest pain, palpitations, dizzy, edema  Gastrointestinal: nausea, vomiting, diarrhea, constipation,belly pain    Yellow skin, blood in stool  Musculoskeletal:  back pain, muscle weakness, gait problems,       joint pain or swelling. Genitourinary:  dysuria, poor urine flow, flank pain, blood in urine  Neurologic:  vertigo, TIA'S, syncope, seizures, focal weakness  Psychosocial:  insomnia, anxiety, or depression. Additional positive findings: -     PMH:   Past medical history, surgical history, social history, family history are reviewed and updated as appropriate. Reviewed current medication list.   Allergies reviewed and updated as needed. PE:   Vitals:    12/20/20 0756   BP: 124/67   Pulse: 61   Resp: 16   Temp: 97.8 °F (36.6 °C)   SpO2: 90%       General appearance: femlae in NAD, fully alert and oriented. Comfortable. HEENT: EOM intact, no icterus. Trachea is midline.    Neck : No masses, appears symmetrical, no JVD, trachea is midline  Respiratory: Respiratory effort appears normal, bilateral equal chest rise  Cardiovascular: Ausculation shows RR, ++ edema  Abdomen: No visible mass or tenderness, non distended. Musculoskeletal:  Joints with no swelling or deformity. Strength grossly normal  Skin:no rashes, ulcers, induration, no jaundice. Neuro: face symmetric, no focal deficits. Appropriate responses.  CN 2-12 grossly intact      Lab Results   Component Value Date    CREATININE 2.4 (H) 12/20/2020    BUN 47 (H) 12/20/2020     (L) 12/20/2020    K 4.3 12/20/2020    CL 90 (L) 12/20/2020    CO2 35 (H) 12/20/2020      Lab Results   Component Value Date    WBC 4.2 12/19/2020    HGB 7.8 (L) 12/19/2020    HCT 24.7 (L) 12/19/2020    MCV 81.1 12/19/2020     12/19/2020     Lab Results   Component Value Date    CALCIUM 10.9 (H) 12/20/2020    PHOS 3.1 12/20/2020

## 2020-12-20 NOTE — PROGRESS NOTES
Physical Therapy  Facility/Department: [place] [address] [facility]  Daily Treatment Note  NAME: Jay Jay Barrera  : 1950  MRN: [id number]    Date of Service: 2020    Discharge Recommendations:  ECF with PT   PT Equipment Recommendations  Equipment Needed: No  Other: defer to patient's facility. If pt is unable to be seen after this session, please let this note serve as discharge summary. Please see case management note for discharge disposition. Thank you. Assessment   Body structures, Functions, Activity limitations: Decreased functional mobility ; Decreased ROM; Decreased strength;Decreased endurance;Decreased balance  Assessment: Patient is making slower than anticipated progress with her therapy goals. This is likely due to her self limiting behavior. Patient agreed to participate with much encouragement but she adamantly refused to sit in the chair. Patient today needed mod assist for bed mobility, transfers and to ambulate 5 feet with rolling walker. Patient continues to present with the therapy deficits listed above. Patient is safe to return to her extended care facility, when medically stable, with continued use of RW and 24/7 assistance with all mobility. Patient would benefit from physical therapy while she resides at her F. PT to continue to follow. Treatment Diagnosis: decreased mobility and strength  Prognosis: Poor  Decision Making: Low Complexity  PT Education: Goals; General Safety; Disease Specific Education;PT Role;Plan of Care;Pressure Relief;Gait Training;Precautions;Transfer Training;Energy Conservation  Patient Education: pt educated in prevention of complications of bedrest, importance of mobility. She voices understanding but continues to decline to sit in the chair. Barriers to Learning: self limiting behavior.   REQUIRES PT FOLLOW UP: Yes  Activity Tolerance  Activity Tolerance: Patient limited by fatigue;Patient limited by endurance  Activity Tolerance: Vitals: 131/42 52 BPM awareness of errors  Insights: Decreased awareness of deficits  Initiation: Requires cues for some  Sequencing: Requires cues for some  Cognition Comment: patient is very self limiting. frequent redirection and encouragement to focus on therapy task at hand. Objective   Bed mobility  Supine to Sit: Moderate assistance(head of bed elevated)  Sit to Supine: Moderate assistance  Scooting: Moderate assistance  Comment: head of bed elevated  Transfers  Sit to Stand: Moderate Assistance  Stand to sit: Moderate Assistance  Bed to Chair: Unable to assess(patient adamantly refused to sit in the chair)  Ambulation  Ambulation?: Yes  More Ambulation?: No  Ambulation 1  Surface: level tile  Device: Rolling Walker  Assistance: Moderate assistance  Quality of Gait: forward flexed posture, cueing to correct. mod assist to help position RW optimally. wide base of support. Gait Deviations: Slow Cindy;Decreased step height;Decreased step length; Increased AIYANA  Distance: 5 feet (side steps to the right to the head of the bed). It appeared patient could ambulate further but she declined. \"That is all I am doing today! \"  Comments: No complaints of shortness of breath, chest pain or dizziness. 1 loss of balance. mod assist to correct. Stairs/Curb  Stairs?: No     Balance  Posture: Fair  Sitting - Static: Fair  Sitting - Dynamic: Fair  Standing - Static: Poor  Standing - Dynamic: Poor  Comments: with RW  Exercises  Hip Flexion: 1 x 10 AAROM bilateral  Knee Long Arc Quad: 1 x 10  Ankle Pumps: 1 x 10  Comments: cueing for sequencing and technique.   Other exercises  Other exercises?: No                        AM-PAC Score     AM-PAC Inpatient Mobility without Stair Climbing Raw Score : 10 (12/20/20 1150)  AM-PAC Inpatient without Stair Climbing T-Scale Score : 34.07 (12/20/20 1150)  Mobility Inpatient CMS 0-100% Score: 71.66 (12/20/20 1150)  Mobility Inpatient without Stair CMS G-Code Modifier : CL (12/20/20 1150)       Goals  Short term goals  Time Frame for Short term goals: 12/15 goals extended due to patient's continued medical complexity and prolonged hospital stay. Short term goal 1: Pt will complete bed mobility wtih min A. 12/20 mod assist  Short term goal 2: Pt will sit to stand wtih SBA. 12/20 mod assist  Short term goal 3: Pt will ambulate x 5' with RW with CGA. 12/20 5 feet with RW and mod assist.  Short term goal 4: Pt will sit in a chair x 60 minutes. 12/20 pt declined to get OOB  Short term goal 5: Pt will participate in B LE exercises to improve functional strength and mobility by 12/11. 12/20 continue goal  Patient Goals   Patient goals : none expressed    Plan    Plan  Times per week: 2-4x/week  Current Treatment Recommendations: Strengthening, Balance Training, Endurance Training, Functional Mobility Training, Transfer Training, Gait Training, Positioning  Safety Devices  Type of devices:  All fall risk precautions in place, Bed alarm in place, Gait belt, Patient at risk for falls, Left in bed, Nurse notified, Call light within reach  Restraints  Initially in place: No     Therapy Time   Individual Concurrent Group Co-treatment   Time In 1112         Time Out 1137         Minutes 25         Timed Code Treatment Minutes: 7800 Radha Montiel, PT

## 2020-12-20 NOTE — PROGRESS NOTES
Hospitalist Progress Note      PCP: Christen Da Silva MD    Date of Admission: 12/4/2020       Chief Complaint: Bilateral leg edema and weight gain    Hospital course: Admitted with edema. Bolus Lasix did not work. Currently on Lasix drip. Also known to have underlying cirrhosis. Seen by cardiology and nephrology. Lasix drip is currently at 15 mg/h. Norvasc stopped 2 days ago. Feels slightly better than a couple days ago. No chest pain. Has shortness of breath with exertion, but improving.   Bradycardia has not persisted, though heart rate is on the slow side of normal.  Cardiac electrophysiology has also evaluated.      Subjective: No chest pain, no shortness of breath at rest, no nausea or vomiting at rest.  Subjectively, nothing new overnight.       Medications:  Reviewed    Infusion Medications    dextrose      furosemide (LASIX) 1mg/ml infusion 15 mg/hr (12/20/20 0356)     Scheduled Medications    insulin glargine  20 Units Subcutaneous Nightly    miconazole   Topical BID    magnesium oxide  400 mg Oral BID    aspirin  81 mg Oral Daily    ketotifen  1 drop Both Eyes BID    [Held by provider] metOLazone  10 mg Oral BID    spironolactone  50 mg Oral BID    potassium chloride  20 mEq Oral BID    fluticasone  1 spray Each Nostril Daily    apixaban  5 mg Oral BID    atorvastatin  20 mg Oral Daily    levothyroxine  125 mcg Oral Daily    pantoprazole  40 mg Oral Daily    sertraline  125 mg Oral Nightly    sodium chloride flush  10 mL Intravenous 2 times per day    insulin lispro  0-12 Units Subcutaneous TID WC    insulin lispro  0-6 Units Subcutaneous Nightly    tiZANidine  4 mg Oral BID     PRN Meds: magnesium sulfate, tetrahydrozoline, sodium chloride flush, acetaminophen **OR** acetaminophen, polyethylene glycol, promethazine **OR** ondansetron, glucose, dextrose, glucagon (rDNA), dextrose, perflutren lipid microspheres, traMADol, carboxymethylcellulose PF, sodium chloride flush      Intake/Output Summary (Last 24 hours) at 12/20/2020 1004  Last data filed at 12/20/2020 0858  Gross per 24 hour   Intake 1640 ml   Output 5100 ml   Net -3460 ml       Physical Exam Performed:    /67   Pulse 61   Temp 97.8 °F (36.6 °C) (Oral)   Resp 16   Ht 5' 5\" (1.651 m)   Wt 271 lb 1.6 oz (123 kg)   SpO2 90%   BMI 45.11 kg/m²     General appearance: No apparent distress, appears stated age and cooperative.  Pleasant  HEENT: Pupils equal, round, and reactive to light. Conjunctivae/corneas clear. Neck: Supple, with full range of motion. No jugular venous distention. Trachea midline. Respiratory:  Normal respiratory effort. Clear to auscultation, bilaterally without Wheezes/Rhonchi.    Cardiovascular: Regular rate and rhythm with normal S1/S2 without murmurs, rubs or gallops. Abdomen: Soft, non-tender, non-distended with normal bowel sounds. Obese abdomen  Musculoskeletal: No clubbing, cyanosis. 2+ lower extremity edema. Pitting. Similar to a couple days ago. Skin: Skin color, texture, turgor normal.  No rashes or lesions. Neurologic:  Neurovascularly intact without any focal sensory/motor deficits. Cranial nerves: II-XII intact, grossly non-focal.  Psychiatric: Alert and oriented, thought content appropriate, normal insight  Capillary Refill: Brisk,< 3 seconds   Peripheral Pulses: +2 palpable, equal bilaterally     I examined the patient today (12/20/20). Physical exam is similar to yesterday (12/19)    Labs:   Recent Labs     12/19/20  0821   WBC 4.2   HGB 7.8*   HCT 24.7*        Recent Labs     12/18/20  1026 12/19/20  0821 12/20/20  0858   * 135* 135*   K 4.1 4.4 4.3   CL 89* 90* 90*   CO2 34* 36* 35*   BUN 37* 41* 47*   CREATININE 1.8* 1.9* 2.4*   CALCIUM 10.6 10.6 10.9*   PHOS 3.2 3.3 3.1     No results for input(s): AST, ALT, BILIDIR, BILITOT, ALKPHOS in the last 72 hours. No results for input(s): INR in the last 72 hours.   Recent Labs     12/18/20  7934 TROPONINI 0.05*       Urinalysis:      Lab Results   Component Value Date    NITRU Negative 12/05/2020    WBCUA 3-5 12/05/2020    BACTERIA 2+ 08/20/2020    RBCUA 5-10 12/05/2020    BLOODU SMALL 12/05/2020    SPECGRAV 1.025 12/05/2020    GLUCOSEU Negative 12/05/2020       Radiology:  CT ABDOMEN PELVIS WO CONTRAST Additional Contrast? None   Final Result   1. Findings of diffuse volume overload including abdominal and pelvic   ascites, anasarca and findings of congestive heart failure in the lower lungs. 2. Hepatic morphologic features of cirrhosis. No gross hepatic lesion   evident. 3. Splenomegaly, typically associated with portal venous hypertension but   nonspecific. 4. Mild right upper quadrant lymph node enlargement is very likely reactive. XR CHEST PORTABLE   Final Result   Vascular congestion. No acute pulmonary disease. Stable enlargement of cardiac silhouette. Assessment/Plan:    Active Hospital Problems    Diagnosis    Acute on chronic diastolic CHF (congestive heart failure) ([facility]) [I50.33]    Morbid obesity ([facility] [E66.01]    Fluid overload [E87.70]    CHF (congestive heart failure) ([facility]) [I50.9]    CAD (coronary artery disease) [I25.10]    Acute kidney injury superimposed on chronic kidney disease ([facility] [N17.9, N18.9]    Essential hypertension [I10]    Type 2 diabetes mellitus, with long-term current use of insulin ([facility]) [E11.9, Z79.4]    Stage 3 chronic kidney disease [N18.30]     PLAN:        Acute on chronic diastolic CHF  Edema also complicated by cirrhosis. Continue furosemide drip and spironolactone. Metolazone also added by nephrology. Combination diuretics appear to be effective. Remains off Norvasc, most likely permanently. Furosemide drip rate is currently 15 mg/h  Cardiology opinion appreciated. There is a consideration for ischemic evaluation early next week. Cardiology to finalize recommendations. Patient admits to past noncompliance. Does not want to go to bathroom often and either skips or refuses diuretics frequently. Overall, stable with continued diuresis and negative fluid balance. Bradycardia  Not on negative chronotropic medications  Continue to monitor on telemetry. Current heart rate is low 50s    Acute kidney injury  Creatinine up to 2.4 today, as expected with aggressive diuresis. Not critical  Nephrology input appreciated  Monitoring daily basic metabolic panel    Paroxysmal atrial fibrillation  Heart rate is controlled and stable  On Eliquis for stroke prophylaxis  Cardiology planning event monitor to assess arrhythmia burden. Diabetes mellitus type 2  Current blood sugar readings are acceptable. Continue with same dose of insulin.     Chronic normocytic anemia  Iron studies are consistent with iron deficiency. Will receive a total of 1 g IV Venofer infusion. Orders written  No signs of active blood loss, but patient is on anticoagulation. Could have last blood slowly over period of time. Last hemoglobin is 7.8 from yesterday. No need for transfusion. Receiving IV iron    Dyslipidemia  Continue statin     Morbid obesity  Body mass index is 36.03 kg/m².  Complicating assessment and treatment. Placing patient at risk for multiple co-morbidities as well as early death and contributing to the patient's presentation. Counseled on weight loss. Noted BMI is probably inaccurate due to extensive edema. However, patient is undoubtedly overweight. Discussed with the patient, questions answered         DVT Prophylaxis: on eliquis  Diet: DIET LOW SODIUM 2 GM; 1500 ml  Code Status: Full Code    PT/OT Eval Status: Skilled nursing facility recommended.     Dispo -unclear date of discharge due to high residual fluid overload.   Continue to follow as inpatient    Montserrat Barr MD

## 2020-12-20 NOTE — PROGRESS NOTES
Cardiology Progress Note     Admit Date: 2020     Reason for follow up: Shortness of breath    Interval History:   - Patient seen and examined. - Clinical notes reviewed. - Telemetry reviewed. Sinus rhythm with PVCS. - No new complaints today. - No major events overnight. - Asleep this am.    Physical Examination:  Vitals:    20 0756   BP: 124/67   Pulse: 61   Resp: 16   Temp: 97.8 °F (36.6 °C)   SpO2: 90%        Intake/Output Summary (Last 24 hours) at 2020 1059  Last data filed at 2020 0858  Gross per 24 hour   Intake 1400 ml   Output 5100 ml   Net -3700 ml     In: 778 [P. O.:600; I.V.:178]  Out: 4300    Wt Readings from Last 3 Encounters:   20 271 lb 1.6 oz (123 kg)   20 242 lb (109.8 kg)     Temp  Av.3 °F (36.8 °C)  Min: 97.8 °F (36.6 °C)  Max: 98.8 °F (37.1 °C)  Pulse  Av.3  Min: 46  Max: 61  BP  Min: 110/44  Max: 139/48  SpO2  Av.5 %  Min: 90 %  Max: 94 %    · Telemetry: Sinus rhythm with PVCs. · Constitutional: Asleep this am. No distress. · Head: Normocephalic and atraumatic. · Cardiovascular: Normal rate, regular rhythm. Normal S1&S2. PVCs noted. · Pulmonary/Chest: Bilateral respiratory sounds present. No respiratory accessory muscle use. · Abdominal: Soft. Normal bowel sounds present. No distension, No tenderness. · Musculoskeletal: No tenderness. Trace edema    · Neurological: Asleep this am.  · Skin: Skin is warm and dry. No rash, lesions, ulcerations noted. · Psychiatric: No anxiety nor agitation. Labs, diagnostic and imaging results reviewed. Reviewed.    Recent Labs     20  1026 20  0821 20  0858   * 135* 135*   K 4.1 4.4 4.3   CL 89* 90* 90*   CO2 34* 36* 35*   PHOS 3.2 3.3 3.1   BUN 37* 41* 47*   CREATININE 1.8* 1.9* 2.4*     Recent Labs     20  0821   WBC 4.2   HGB 7.8*   HCT 24.7*   MCV 81.1        Lab Results   Component Value Date    TROPONINI 0.05 2020     Estimated ventricular response ([facility] 08/20/2020    DIONNE (acute kidney injury) ([facility] 08/20/2020    Type 2 diabetes mellitus, with long-term current use of insulin ([facility] 08/20/2020    Essential hypertension 08/20/2020    GERD (gastroesophageal reflux disease) 08/20/2020    Dementia ([facility] 08/20/2020    Acquired hypothyroidism 08/20/2020      Active Hospital Problems    Diagnosis Date Noted    Acute on chronic diastolic CHF (congestive heart failure) ([facility] [I50.33] 12/06/2020    Morbid obesity ([facility] [E66.01] 12/05/2020    Fluid overload [E87.70] 12/05/2020    CHF (congestive heart failure) ([facility]) [I50.9]     CAD (coronary artery disease) [I25.10]     Acute kidney injury superimposed on chronic kidney disease ([facility] [N17.9, N18.9] 08/20/2020    Essential hypertension [I10] 08/20/2020    Type 2 diabetes mellitus, with long-term current use of insulin ([facility]) [E11.9, Z79.4] 08/20/2020    Stage 3 chronic kidney disease [N18.30] 08/20/2020     Mrs. Lea Cast is a pleasant 18-year-old female with a complicated medical history significant for ischemic cardiomyopathy status post CABG (~1995), paroxysmal atrial fibrillation, chronic renal insufficiency stage III, heart failure preserved ejection fraction, hypothyroidism, hypertension, diabetes mellitus type 2, and hyperlipidemia who presented from home with acute on chronic heart failure. Assessment and Plan:   1. Ischemic cardiomyopathy status post CABG (1995). 12/19/2020  Patient is a pleasant 18-year-old female with a complicated medical history significant for ischemic cardiomyopathy status post CABG in 1995, paroxysmal atrial fibrillation on apixaban, chronic renal insufficiency, hypothyroidism, hypertension, diabetes mellitus type 2, and hyperlipidemia who presents from home with acute on chronic heart failure. She is currently being diuresed with IV furosemide. Nephrology is on board.   Plan was to check her troponin enzymes and see if they are trending upward however they are currently stable at 0.05. Her echocardiogram is pending today (we should have resolved today). If echo shows any wall motion normalities we will plan for left heart catheterization if not we will plan for stress test.    12/20/2020  No acute events overnight. Echocardiogram reassuring. Troponin enzyme stable, likely secondary to heart failure. - No need for ischemic evaluation based on previous notes and my review of case. - Plan as per below. 2. Heart failure with preserved ejection fraction. 12/20/2020  Patient has diuresed well. Having worsening renal function. I will stop furosemide drip and allow her a diuretic holiday. May restart oral diuretics once renal function improves. - Hold furosemide drip. - Continue to hold metolazone. 3. Acute on chronic renal failure (baseline Cr ~1.2). - Plan as per above. 4. Paroxysmal atrial fibrillation. 12/19/2020  Patient with a [id number] score of 5. Currently in normal sinus rhythm though did have some reported bradycardia on admission. Not on ziyad agents however may need. 12/20/2020  Renal function worsening. I will decrease apixaban until renal function improves. - Temporarily decrease apixaban to 2.5 mg BID.  - Continue to monitor for atrial fibrillation in which case she may need ziyad agent. - Two week monitor at time of discharge to look for atrial fibrillation with RVR. Thank you for allowing me to participate in the care of this patient. If you have any questions, please do not hesitate to contact me.     Saloni Banuelos MD  Cardiac Electrophysiology  [address] [facility]  [phone] [facility]

## 2020-12-21 LAB
ALBUMIN SERPL-MCNC: 3.8 G/DL (ref 3.4–5)
ANION GAP SERPL CALCULATED.3IONS-SCNC: 11 MMOL/L (ref 3–16)
BASOPHILS ABSOLUTE: 0 K/UL (ref 0–0.2)
BASOPHILS RELATIVE PERCENT: 0.7 %
BUN BLDV-MCNC: 52 MG/DL (ref 7–20)
CALCIUM SERPL-MCNC: 10.7 MG/DL (ref 8.3–10.6)
CHLORIDE BLD-SCNC: 92 MMOL/L (ref 99–110)
CO2: 34 MMOL/L (ref 21–32)
CREAT SERPL-MCNC: 2.3 MG/DL (ref 0.6–1.2)
EOSINOPHILS ABSOLUTE: 0.2 K/UL (ref 0–0.6)
EOSINOPHILS RELATIVE PERCENT: 3.8 %
GFR AFRICAN AMERICAN: 25
GFR NON-AFRICAN AMERICAN: 21
GLUCOSE BLD-MCNC: 162 MG/DL (ref 70–99)
GLUCOSE BLD-MCNC: 181 MG/DL (ref 70–99)
GLUCOSE BLD-MCNC: 200 MG/DL (ref 70–99)
GLUCOSE BLD-MCNC: 217 MG/DL (ref 70–99)
GLUCOSE BLD-MCNC: 322 MG/DL (ref 70–99)
HCT VFR BLD CALC: 25 % (ref 36–48)
HEMOGLOBIN: 7.9 G/DL (ref 12–16)
LYMPHOCYTES ABSOLUTE: 0.4 K/UL (ref 1–5.1)
LYMPHOCYTES RELATIVE PERCENT: 10.1 %
MAGNESIUM: 2.4 MG/DL (ref 1.8–2.4)
MCH RBC QN AUTO: 25.9 PG (ref 26–34)
MCHC RBC AUTO-ENTMCNC: 31.5 G/DL (ref 31–36)
MCV RBC AUTO: 82.2 FL (ref 80–100)
MONOCYTES ABSOLUTE: 0.4 K/UL (ref 0–1.3)
MONOCYTES RELATIVE PERCENT: 8 %
NEUTROPHILS ABSOLUTE: 3.4 K/UL (ref 1.7–7.7)
NEUTROPHILS RELATIVE PERCENT: 77.4 %
PDW BLD-RTO: 18.2 % (ref 12.4–15.4)
PERFORMED ON: ABNORMAL
PHOSPHORUS: 3 MG/DL (ref 2.5–4.9)
PLATELET # BLD: 146 K/UL (ref 135–450)
PMV BLD AUTO: 8.3 FL (ref 5–10.5)
POTASSIUM SERPL-SCNC: 4.6 MMOL/L (ref 3.5–5.1)
RBC # BLD: 3.04 M/UL (ref 4–5.2)
SODIUM BLD-SCNC: 137 MMOL/L (ref 136–145)
WBC # BLD: 4.4 K/UL (ref 4–11)

## 2020-12-21 PROCEDURE — 36415 COLL VENOUS BLD VENIPUNCTURE: CPT

## 2020-12-21 PROCEDURE — 2580000003 HC RX 258: Performed by: NURSE PRACTITIONER

## 2020-12-21 PROCEDURE — 6370000000 HC RX 637 (ALT 250 FOR IP): Performed by: NURSE PRACTITIONER

## 2020-12-21 PROCEDURE — 83735 ASSAY OF MAGNESIUM: CPT

## 2020-12-21 PROCEDURE — 97116 GAIT TRAINING THERAPY: CPT

## 2020-12-21 PROCEDURE — 99232 SBSQ HOSP IP/OBS MODERATE 35: CPT | Performed by: NURSE PRACTITIONER

## 2020-12-21 PROCEDURE — 6370000000 HC RX 637 (ALT 250 FOR IP): Performed by: INTERNAL MEDICINE

## 2020-12-21 PROCEDURE — 97110 THERAPEUTIC EXERCISES: CPT

## 2020-12-21 PROCEDURE — 1200000000 HC SEMI PRIVATE

## 2020-12-21 PROCEDURE — 80069 RENAL FUNCTION PANEL: CPT

## 2020-12-21 PROCEDURE — 85025 COMPLETE CBC W/AUTO DIFF WBC: CPT

## 2020-12-21 PROCEDURE — 97530 THERAPEUTIC ACTIVITIES: CPT

## 2020-12-21 RX ORDER — SPIRONOLACTONE 25 MG/1
50 TABLET ORAL DAILY
Status: DISCONTINUED | OUTPATIENT
Start: 2020-12-22 | End: 2020-12-24 | Stop reason: HOSPADM

## 2020-12-21 RX ORDER — TORSEMIDE 20 MG/1
20 TABLET ORAL 2 TIMES DAILY
Status: DISCONTINUED | OUTPATIENT
Start: 2020-12-22 | End: 2020-12-23

## 2020-12-21 RX ADMIN — SODIUM CHLORIDE, PRESERVATIVE FREE 10 ML: 5 INJECTION INTRAVENOUS at 22:00

## 2020-12-21 RX ADMIN — MICONAZOLE NITRATE: 20 POWDER TOPICAL at 08:38

## 2020-12-21 RX ADMIN — MAGNESIUM GLUCONATE 500 MG ORAL TABLET 400 MG: 500 TABLET ORAL at 22:00

## 2020-12-21 RX ADMIN — INSULIN GLARGINE 20 UNITS: 100 INJECTION, SOLUTION SUBCUTANEOUS at 22:00

## 2020-12-21 RX ADMIN — SERTRALINE HYDROCHLORIDE 125 MG: 50 TABLET ORAL at 22:03

## 2020-12-21 RX ADMIN — FLUTICASONE PROPIONATE 1 SPRAY: 50 SPRAY, METERED NASAL at 08:38

## 2020-12-21 RX ADMIN — INSULIN LISPRO 2 UNITS: 100 INJECTION, SOLUTION INTRAVENOUS; SUBCUTANEOUS at 22:00

## 2020-12-21 RX ADMIN — KETOTIFEN FUMARATE 1 DROP: 0.35 SOLUTION/ DROPS OPHTHALMIC at 22:00

## 2020-12-21 RX ADMIN — KETOTIFEN FUMARATE 1 DROP: 0.35 SOLUTION/ DROPS OPHTHALMIC at 08:38

## 2020-12-21 RX ADMIN — ASPIRIN 81 MG: 81 TABLET, COATED ORAL at 08:38

## 2020-12-21 RX ADMIN — MICONAZOLE NITRATE: 20 POWDER TOPICAL at 22:00

## 2020-12-21 RX ADMIN — SODIUM CHLORIDE, PRESERVATIVE FREE 10 ML: 5 INJECTION INTRAVENOUS at 08:38

## 2020-12-21 RX ADMIN — APIXABAN 2.5 MG: 2.5 TABLET, FILM COATED ORAL at 22:00

## 2020-12-21 RX ADMIN — SPIRONOLACTONE 50 MG: 25 TABLET ORAL at 08:38

## 2020-12-21 RX ADMIN — INSULIN LISPRO 2 UNITS: 100 INJECTION, SOLUTION INTRAVENOUS; SUBCUTANEOUS at 08:39

## 2020-12-21 RX ADMIN — TIZANIDINE 4 MG: 4 TABLET ORAL at 08:38

## 2020-12-21 RX ADMIN — MAGNESIUM GLUCONATE 500 MG ORAL TABLET 400 MG: 500 TABLET ORAL at 08:38

## 2020-12-21 RX ADMIN — APIXABAN 2.5 MG: 2.5 TABLET, FILM COATED ORAL at 08:38

## 2020-12-21 RX ADMIN — PANTOPRAZOLE SODIUM 40 MG: 40 TABLET, DELAYED RELEASE ORAL at 08:38

## 2020-12-21 RX ADMIN — TIZANIDINE 4 MG: 4 TABLET ORAL at 22:03

## 2020-12-21 RX ADMIN — LEVOTHYROXINE SODIUM 125 MCG: 0.12 TABLET ORAL at 06:38

## 2020-12-21 RX ADMIN — INSULIN LISPRO 4 UNITS: 100 INJECTION, SOLUTION INTRAVENOUS; SUBCUTANEOUS at 18:38

## 2020-12-21 RX ADMIN — ATORVASTATIN CALCIUM 20 MG: 10 TABLET, FILM COATED ORAL at 08:38

## 2020-12-21 RX ADMIN — INSULIN LISPRO 8 UNITS: 100 INJECTION, SOLUTION INTRAVENOUS; SUBCUTANEOUS at 13:20

## 2020-12-21 ASSESSMENT — PAIN SCALES - GENERAL
PAINLEVEL_OUTOF10: 0

## 2020-12-21 NOTE — PROGRESS NOTES
[facility]                                                 [phone]                                      [facility]                                        Inpatient Progress note    Summary:   Sandi Nixon is being seen by nephrology for DIONNE on CKD. Admitted with SOB. 71-year-old  female with a past medical history of hypertension, hyperlipidemia, diabetes mellitus, morbid obesity, history of congestive heart failure, diastolic heart failure, coronary artery disease, atrial fibrillation, bilateral lower extremity edema previous history of cellulitis, and has had multiple admissions for weight gain and volume overload. Interval History  Seen and examined at bedside.had no complaint today. Just fatigued. her breathing is comfortable but has to sit up in bed. Edema still there but improved    Vitally stable   90%on room air. Off lasix gtt / metolazone.  >309 >298 >302  > 271--> 263 lbs   UO 4.9 L    lytes stable. BUN 37 > 41 > 47> 52  Cr 1.8 > 1.9 > 2.4> 2.4    Plan:    diuretic holiday today. - strict Io and weights.   - maybe can do torsemide 20 BID with fluid restriction at discharge. - will need close followup to prevent massive increase in fluid gains in the future. - can d/c morgan in AM       [facility] thanks you for the opportunity to serve this patient. Please call with any questions of concerns. Parminder Pike MD  [facility]  [address]  Fax: [phone]  Office: [phone]    Assessment:   Generalized anasarca/massive volume overload. Tells me her weight is 220 pounds dry. Currently is 335 pounds. Started on Lasix drip I will add metolazone. Watch for hypokalemia. She may get alkalotic may need to use some doses of albumin. I will also start the patient on Aldactone for the time being for potassium conservation.   Last admission she was down to 324 pounds at discharge.        Acute Kidney Injury on CKD III:      DIONNE likely due to - cardiorenal syndrome, r/o urinary tract infection, also has cirrhosis of the liver with decompensation  Cr on consultation 1.3   Baseline Cr-1.5 with wide fluctuations. from 4/9  She is followed by Dr. Dulce Bledsoe follow-up was 5/19     UA trace blood ,  otherwise bland  Renal Imaging:- 8/20- no hydronephrosis, no sig abnormality  Echo: 11/18-EF normal moderate TR     Hypertension   BP goal inpatient [id number] systolic inpatient  Known to have atrial fibrillation     Cirrhosis of the liver-with portal  Diabetes mellitus on insulin  Hyperuricemia      DM   Morbid obesity. History of A. fib. Patient history of medication noncompliance. Patient is a full code.     ROS:     Positives Listed Bold. All other remaining systems are negative. Constitutional:  fever, chills, weakness, weight change, fatigue,      Skin:  rash, pruritus, hair loss, bruising, dry skin, petechiae. Head, Face, Neck   headaches, swelling,  cervical adenopathy. Respiratory: shortness of breath, cough, or wheezing  Cardiovascular: chest pain, palpitations, dizzy, edema  Gastrointestinal: nausea, vomiting, diarrhea, constipation,belly pain    Yellow skin, blood in stool  Musculoskeletal:  back pain, muscle weakness, gait problems,       joint pain or swelling. Genitourinary:  dysuria, poor urine flow, flank pain, blood in urine  Neurologic:  vertigo, TIA'S, syncope, seizures, focal weakness  Psychosocial:  insomnia, anxiety, or depression. Additional positive findings: -     PMH:   Past medical history, surgical history, social history, family history are reviewed and updated as appropriate. Reviewed current medication list.   Allergies reviewed and updated as needed. PE:   Vitals:    12/21/20 1603   BP: (!) 124/43   Pulse: 50   Resp: 16   Temp: 97.9 °F (36.6 °C)   SpO2: 91%       General appearance: femlae in NAD, fully alert and oriented. Comfortable. HEENT: EOM intact, no icterus. Trachea is midline. Neck : No masses, appears symmetrical, no JVD, trachea is midline  Respiratory: Respiratory effort appears normal, bilateral equal chest rise  Cardiovascular: Ausculation shows RR, ++ edema  Abdomen: No visible mass or tenderness, non distended. Musculoskeletal:  Joints with no swelling or deformity. Strength grossly normal  Skin:no rashes, ulcers, induration, no jaundice. Neuro: face symmetric, no focal deficits. Appropriate responses.  CN 2-12 grossly intact      Lab Results   Component Value Date    CREATININE 2.3 (H) 12/21/2020    BUN 52 (H) 12/21/2020     12/21/2020    K 4.6 12/21/2020    CL 92 (L) 12/21/2020    CO2 34 (H) 12/21/2020      Lab Results   Component Value Date    WBC 4.4 12/21/2020    HGB 7.9 (L) 12/21/2020    HCT 25.0 (L) 12/21/2020    MCV 82.2 12/21/2020     12/21/2020     Lab Results   Component Value Date    CALCIUM 10.7 (H) 12/21/2020    PHOS 3.0 12/21/2020

## 2020-12-21 NOTE — PROGRESS NOTES
Hospitalist Progress Note      PCP: Oniel Monet MD    Date of Admission: 12/4/2020    Chief Complaint: Bilateral leg edema and weight gain     Hospital course: Admitted with edema. Bolus Lasix did not work. Currently on Lasix drip. Also known to have underlying cirrhosis. Seen by cardiology and nephrology. Lasix drip is currently at 15 mg/h. Norvasc stopped 2 days ago. Feels slightly better than a couple days ago. No chest pain. Has shortness of breath with exertion, but improving.   Bradycardia has not persisted, though heart rate is on the slow side of normal.  Cardiac electrophysiology has also evaluated.      Subjective: No chest pain, no shortness of breath at rest, no nausea or vomiting at rest.  Subjectively, nothing new overnight.       Medications:  Reviewed    Infusion Medications    dextrose      [Held by provider] furosemide (LASIX) 1mg/ml infusion Stopped (12/20/20 1110)     Scheduled Medications    apixaban  2.5 mg Oral BID    insulin glargine  20 Units Subcutaneous Nightly    miconazole   Topical BID    magnesium oxide  400 mg Oral BID    aspirin  81 mg Oral Daily    ketotifen  1 drop Both Eyes BID    [Held by provider] metOLazone  10 mg Oral BID    spironolactone  50 mg Oral BID    fluticasone  1 spray Each Nostril Daily    atorvastatin  20 mg Oral Daily    levothyroxine  125 mcg Oral Daily    pantoprazole  40 mg Oral Daily    sertraline  125 mg Oral Nightly    sodium chloride flush  10 mL Intravenous 2 times per day    insulin lispro  0-12 Units Subcutaneous TID WC    insulin lispro  0-6 Units Subcutaneous Nightly    tiZANidine  4 mg Oral BID     PRN Meds: magnesium sulfate, tetrahydrozoline, sodium chloride flush, acetaminophen **OR** acetaminophen, polyethylene glycol, promethazine **OR** ondansetron, glucose, dextrose, glucagon (rDNA), dextrose, perflutren lipid microspheres, traMADol, carboxymethylcellulose PF, sodium chloride flush      Intake/Output Summary (Last 24 hours) at 12/21/2020 0857  Last data filed at 12/21/2020 0844  Gross per 24 hour   Intake 607 ml   Output 5100 ml   Net -4493 ml       Physical Exam Performed:    BP (!) 115/45   Pulse 58   Temp 98.1 °F (36.7 °C) (Oral)   Resp 16   Ht 5' 5\" (1.651 m)   Wt 263 lb 1.6 oz (119.3 kg)   SpO2 90%   BMI 43.78 kg/m²      General appearance: No apparent distress, appears stated age and cooperative.  Pleasant  HEENT: Pupils equal, round, and reactive to light. Conjunctivae/corneas clear. Neck: Supple, with full range of motion. No jugular venous distention. Trachea midline. Respiratory:  Normal respiratory effort. Clear to auscultation, bilaterally without Wheezes/Rhonchi.    Cardiovascular: Regular rate and rhythm with normal S1/S2 without murmurs, rubs or gallops. Abdomen: Soft, non-tender, non-distended with normal bowel sounds. Obese abdomen  Musculoskeletal: No clubbing, cyanosis. 2+ lower extremity edema. Pitting. Similar to a couple days ago. Skin: Skin color, texture, turgor normal.  No rashes or lesions. Neurologic:  Neurovascularly intact without any focal sensory/motor deficits. Cranial nerves: II-XII intact, grossly non-focal.  Psychiatric: Alert and oriented, thought content appropriate, normal insight  Capillary Refill: Brisk,< 3 seconds   Peripheral Pulses: +2 palpable, equal bilaterally       Labs:   Recent Labs     12/19/20  0821 12/21/20  0808   WBC 4.2 4.4   HGB 7.8* 7.9*   HCT 24.7* 25.0*    146     Recent Labs     12/18/20  1026 12/19/20  0821 12/20/20  0858   * 135* 135*   K 4.1 4.4 4.3   CL 89* 90* 90*   CO2 34* 36* 35*   BUN 37* 41* 47*   CREATININE 1.8* 1.9* 2.4*   CALCIUM 10.6 10.6 10.9*   PHOS 3.2 3.3 3.1     No results for input(s): AST, ALT, BILIDIR, BILITOT, ALKPHOS in the last 72 hours. No results for input(s): INR in the last 72 hours.   Recent Labs     12/18/20  1544   TROPONINI 0.05*       Urinalysis:      Lab Results   Component Value Date NITRU Negative 12/05/2020    WBCUA 3-5 12/05/2020    BACTERIA 2+ 08/20/2020    RBCUA 5-10 12/05/2020    BLOODU SMALL 12/05/2020    SPECGRAV 1.025 12/05/2020    GLUCOSEU Negative 12/05/2020       Radiology:  CT ABDOMEN PELVIS WO CONTRAST Additional Contrast? None   Final Result   1. Findings of diffuse volume overload including abdominal and pelvic   ascites, anasarca and findings of congestive heart failure in the lower lungs. 2. Hepatic morphologic features of cirrhosis. No gross hepatic lesion   evident. 3. Splenomegaly, typically associated with portal venous hypertension but   nonspecific. 4. Mild right upper quadrant lymph node enlargement is very likely reactive. XR CHEST PORTABLE   Final Result   Vascular congestion. No acute pulmonary disease. Stable enlargement of cardiac silhouette. Assessment/Plan:    Active Hospital Problems    Diagnosis    Acute on chronic diastolic CHF (congestive heart failure) ([facility]) [I50.33]    Morbid obesity ([facility] [address] [E66.01]    Fluid overload [E87.70]    CHF (congestive heart failure) ([facility]) [I50.9]    CAD (coronary artery disease) [I25.10]    Acute kidney injury superimposed on chronic kidney disease ([facility] [address] [N17.9, N18.9]    Essential hypertension [I10]    Type 2 diabetes mellitus, with long-term current use of insulin ([facility]) [E11.9, Z79.4]    Stage 3 chronic kidney disease [N18.30]     Acute on chronic diastolic CHF  Edema also complicated by cirrhosis. Continue furosemide drip and spironolactone. Metolazone also added by nephrology. Combination diuretics appear to be effective. Remains off Norvasc, most likely permanently. Furosemide drip rate is currently 15 mg/h  Cardiology opinion appreciated. There is a consideration for ischemic evaluation -defer to cards. Cardiology to finalize recommendations. Patient admits to past noncompliance.   Does not want to go to bathroom often and either skips or refuses diuretics frequently. Overall, stable with continued diuresis and negative fluid balance.     Bradycardia  -Not on negative chronotropic medications  -Continued to monitor on telemetry. Current heart rate is low 50s     Acute kidney injury  Creatinine up to 2.4 today, as expected with aggressive diuresis. Not critical  Nephrology input appreciated  Monitoring daily basic metabolic panel     Paroxysmal atrial fibrillation  Heart rate is controlled and stable  On Eliquis for stroke prophylaxis  Cardiology planning event monitor to assess arrhythmia burden.     Diabetes mellitus type 2  Current blood sugar readings are acceptable. Continue with same dose of insulin.     Chronic normocytic anemia  Iron studies are consistent with iron deficiency.  -given total of 1 g IV Venofer infusion. Orders written  No signs of active blood loss, but patient is on anticoagulation. Could have last blood slowly over period of time. Last hemoglobin is 7.8 from yesterday. No need for transfusion. Receiving IV iron     Dyslipidemia  Continue statin     Morbid obesity  Body mass index is 28.74 kg/m².  Complicating assessment and treatment. Placing patient at risk for multiple co-morbidities as well as early death and contributing to the patient's presentation. Counseled on weight loss. Noted BMI is probably inaccurate due to extensive edema. However, patient is undoubtedly overweight. DVT Prophylaxis: on eliquis  Diet: DIET LOW SODIUM 2 GM; 1500 ml  Code Status: Full Code    PT/OT Eval Status: rec ecf with pt/ot    Dispo -unclear date of discharge due to high residual fluid overload.  Continue diuresis    Joey Bentley MD

## 2020-12-21 NOTE — PROGRESS NOTES
Comprehensive Nutrition Assessment    Type and Reason for Visit:  Reassess    Nutrition Recommendations/Plan:   1. Continue low sodium diet  2. Continue 1500 ml FR per MD  3. Monitor po intakes, nutrition adequacy, weights, pertinent labs, BMs  4. Encourage diet compliance    Nutrition Assessment:  Follow up: Pt remains a t nutirtional risk d/t extended hospital stay. Pt endorses fair appetite, po intakes % per EMR generally. Pt continues on 2 gm Na diet with 1500 ml FR. Pt requesting salt be added to tray. Encouraged diet compliance. Malnutrition Assessment:  Malnutrition Status:   At risk for malnutrition (Comment)      Estimated Daily Nutrient Needs:  Energy (kcal):  2762-7645 kcal; Weight Used for Energy Requirements:  Current(146 kg)     Protein (g):   g; Weight Used for Protein Requirements:  Ideal(1.5-2.0 g)        Fluid (ml/day):  1500 mL FR; Method Used for Fluid Requirements:         Nutrition Related Findings:  BM x1 on 12/17      Wounds:  (redness to coccyx)       Current Nutrition Therapies:    DIET LOW SODIUM 2 GM; 1500 ml    Anthropometric Measures:  · Height: 5' 5\" (165.1 cm)  · Current Body Weight: 263 lb 1.6 oz (119.3 kg)   · Admission Body Weight: 343 lb (155.6 kg)(bed scale)    · Ideal Body Weight: 125 lbs  · BMI: 43.8  · BMI Categories: Obese Class 3 (BMI 40.0 or greater)       Nutrition Diagnosis:   · Overweight/Obese related to excessive energy intake as evidenced by BMI    Nutrition Interventions:   Food and/or Nutrient Delivery:  Continue Current Diet  Nutrition Education/Counseling:  Education initiated   Coordination of Nutrition Care:  Continue to monitor while inpatient    Goals:  Compliance with 2gm Na diet to prevent further CHF exacerbations       Nutrition Monitoring and Evaluation:   Behavioral-Environmental Outcomes:  Readiness for Change, Beliefs and Attitutes   Food/Nutrient Intake Outcomes:  Food and Nutrient Intake  Physical Signs/Symptoms Outcomes:  Fluid Status or Edema     Discharge Planning:    Continue current diet     Electronically signed by Tom Johnston RD, LD on 12/21/20 at 10:42 AM EST    Contact: [phone]

## 2020-12-21 NOTE — PROGRESS NOTES
Physical Therapy  Facility/Department: [place] [address] [facility]  Daily Treatment Note  NAME: Teddy Peterson  : 1950  MRN: [id number]    Date of Service: 2020    Discharge Recommendations:  ECF with PT   PT Equipment Recommendations  Equipment Needed: No  Other: defer to patient's facility. Assessment   Body structures, Functions, Activity limitations: Decreased functional mobility ; Decreased ROM; Decreased strength;Decreased endurance;Decreased balance  Assessment: PT tx session focused on bed mobility, functional transfers, and overall activity tolerance. Pt demonstrated improvement in amount of assistance required to perform bed mobility, transfers and short distance ambulation. It appears that pt is capable of doing more and ambulating further, however frequently stalls and changes the subject to avoid performing activity requiring max encouragement throughout session. Pt was ultimately able to walk ~8ft at EOB sidestepping with RW with CGA only, requiring min A for transfers. Pt continues to present with therapy deficits listed above, will continue to follow. Patient is safe to return to her extended care facility when medically stable with continued use of RW and 24/7 assistane with all mobility. Pt will benefit from PT while she resides at her ECF. Treatment Diagnosis: decreased mobility and strength  Prognosis: Poor  Decision Making: Low Complexity  PT Education: Goals; General Safety; Disease Specific Education;PT Role;Plan of Care;Pressure Relief;Gait Training;Precautions;Transfer Training;Energy Conservation  Patient Education: pt educated in prevention of complications of bedrest, importance of mobility. She voices understanding but continues to decline to sit in the chair. Barriers to Learning: self limiting behavior.   REQUIRES PT FOLLOW UP: Yes  Activity Tolerance  Activity Tolerance: Patient limited by endurance  Activity Tolerance: RN reporting HR in 30s-40s this afternoon, encouraged activity to increase HR. 51bpm at end of session. Pt with no c/o chest pain, SOB or dizziness throughout session. Functional mobility mainly limited by self-limiting behaviors. Patient Diagnosis(es): The primary encounter diagnosis was Acute kidney injury superimposed on chronic kidney disease ([facility] [address] Diagnoses of Anasarca, Liver cirrhosis secondary to LARRY (nonalcoholic steatohepatitis) ([facility] [address] Cirrhosis of liver with ascites, unspecified hepatic cirrhosis type ([facility] [address] and Symptomatic anemia were also pertinent to this visit. has a past medical history of Anxiety, Arthritis, Atrial fibrillation ([facility] [address] CAD (coronary artery disease), CHF (congestive heart failure) ([facility] [address] Chronic pain syndrome, Cirrhosis ([facility] [address] Depression, Diabetes mellitus ([facility] [address] GERD (gastroesophageal reflux disease), Hyperlipidemia, Hypertension, Kidney disease, Thyroid disease, and Vitamin D deficiency. has a past surgical history that includes Coronary Artery Bypass Graft Maze Procedure (1995); Cholecystectomy; and Breast reduction surgery. Restrictions  Restrictions/Precautions  Restrictions/Precautions: Fall Risk, Up as Tolerated  Position Activity Restriction  Other position/activity restrictions: Dario  Subjective   General  Chart Reviewed: Yes  Response To Previous Treatment: Patient with no complaints from previous session. Family / Caregiver Present: No  Referring Practitioner: Geri Sidhu MD  Subjective  Subjective: Pt required max encouragement from PT and RN to participate  General Comment  Comments: RN cleared for therapy, bradycardic but encouraging activity to increase HR. Pain Screening  Patient Currently in Pain: Denies  Pain Assessment  Response to Pain Intervention: Patient Satisfied  Vital Signs  Patient Currently in Pain: Denies       Cognition  Overall Cognitive Status: Exceptions  Following Commands: Follows one step commands consistently  Attention Span: Attends with cues to redirect; Difficulty dividing attention  Safety Judgement: Decreased awareness of need for safety  Problem Solving: Assistance required to correct errors made;Assistance required to identify errors made;Decreased awareness of errors  Insights: Decreased awareness of deficits  Initiation: Requires cues for some  Sequencing: Requires cues for some  Cognition Comment: patient is very self limiting. frequent redirection and encouragement to focus on therapy task at hand. Objective   Bed mobility  Supine to Sit: Stand by assistance(HOB elevated, use of side rails, increased time to perform.)  Sit to Supine: Minimal assistance(Assist for LE management to lift into bed)  Scooting: Moderate assistance(At EOB to achieve foot flat, required assistance from draw sheet.)  Transfers  Sit to Stand: Minimal Assistance  Stand to sit: Contact guard assistance  Comment: From EOB, cues for hand placement. Min A for initial lift and immediate balance. Ambulation  Ambulation?: Yes  More Ambulation?: No  Ambulation 1  Surface: level tile  Device: Rolling Walker  Assistance: Contact guard assistance  Quality of Gait: Wide AIYANA, mild forward flexed posture, cues to stay within the walker, decreased step length and increased time to perform. Distance: 8ft (side steps to R and L along EOB)  Comments: No c/o chest pain, SOB, or dizziness throughout. No LOB.   Stairs/Curb  Stairs?: No     Balance  Posture: Fair  Sitting - Static: Good  Sitting - Dynamic: Good;-  Standing - Static: Fair  Standing - Dynamic: Fair  Comments: Pt sat EOB x 8 minutes with supervision in posterior tripod position with supervision  Exercises  Quad Sets: x15 B  Gluteal Sets: x15 B  Ankle Pumps: x15 B        AM-PAC Score  AM-PAC Inpatient Mobility Raw Score : 15 (12/21/20 1637)  AM-PAC Inpatient T-Scale Score : 39.45 (12/21/20 1637)  Mobility Inpatient CMS 0-100% Score: 57.7 (12/21/20 1637)  Mobility Inpatient CMS G-Code Modifier : CK (12/21/20 1637)          Goals  Short term goals  Time Frame for

## 2020-12-21 NOTE — PROGRESS NOTES
anticoagulation with PAF, currently in sinus rhythm sinus bradycardia    5. Continue aspirin and statin for CAD    6. Anticipate resuming lower dose diuretic tomorrow. Goal weight ~ 220 lbs, currently 263 lbs    Rashida GaitanDANIELLE CNP, 12/21/2020, 8:47 AM  [address]   [phone]       Telemetry: SR 40-60  NYHA: III    Physical Exam:  General:  Awake, alert, NAD  Skin:  Warm and dry  Neck:  JVP 10 cm  Chest:  Left basilar rales  Cardiovascular:  RRR, normal T4Z7, soft systolic murmur, no RG  Abdomen:  Soft, nontender, +bowel sounds  Extremities: 1+ BLE edema, positive wrinkling        Medications:    apixaban  2.5 mg Oral BID    insulin glargine  20 Units Subcutaneous Nightly    miconazole   Topical BID    magnesium oxide  400 mg Oral BID    aspirin  81 mg Oral Daily    ketotifen  1 drop Both Eyes BID    [Held by provider] metOLazone  10 mg Oral BID    spironolactone  50 mg Oral BID    fluticasone  1 spray Each Nostril Daily    atorvastatin  20 mg Oral Daily    levothyroxine  125 mcg Oral Daily    pantoprazole  40 mg Oral Daily    sertraline  125 mg Oral Nightly    sodium chloride flush  10 mL Intravenous 2 times per day    insulin lispro  0-12 Units Subcutaneous TID WC    insulin lispro  0-6 Units Subcutaneous Nightly    tiZANidine  4 mg Oral BID      dextrose      [Held by provider] furosemide (LASIX) 1mg/ml infusion Stopped (12/20/20 1110)       Lab Data: Lab results independently reviewed by myself 12/21/20   CBC:   Recent Labs     12/19/20  0821   WBC 4.2   HGB 7.8*        BMP:    Recent Labs     12/18/20  1026 12/19/20  0821 12/20/20  0858   * 135* 135*   K 4.1 4.4 4.3   CO2 34* 36* 35*   BUN 37* 41* 47*   CREATININE 1.8* 1.9* 2.4*     INR:  No results for input(s): INR in the last 72 hours. BNP:  No results for input(s): PROBNP in the last 72 hours.   Cardiac Enzymes:   Recent Labs     12/18/20  1544   TROPONINI 0.05*     Lipids:   Lab Results   Component Value Date    TRIG 54 12/06/2020    HDL 58 12/06/2020    LDLCALC 36 12/06/2020       Cardiac Imaging:    Echo 12/7/2020:   Limited only f/u for LVEF. Normal left ventricular systolic function with ejection fraction of 55-60%. No regional wall motion abnormalites are seen. Septal bounce due to right ventricular pressure and volume overload. The right ventricle is mildly enlarged. Right ventricular systolic function is moderately reduced . Moderate tricuspid regurgitation. Definity echo contrast used. Systolic pulmonic artery pressure (SPAP) is estimated at 67 mmHg consistent   with severe pulmonary hypertension (Right atrial pressure of 8 mmHg). The right atrium is moderately dilated.

## 2020-12-22 LAB
ALBUMIN SERPL-MCNC: 3.8 G/DL (ref 3.4–5)
ANION GAP SERPL CALCULATED.3IONS-SCNC: 11 MMOL/L (ref 3–16)
BUN BLDV-MCNC: 48 MG/DL (ref 7–20)
CALCIUM SERPL-MCNC: 10.8 MG/DL (ref 8.3–10.6)
CHLORIDE BLD-SCNC: 94 MMOL/L (ref 99–110)
CO2: 30 MMOL/L (ref 21–32)
CREAT SERPL-MCNC: 2.3 MG/DL (ref 0.6–1.2)
GFR AFRICAN AMERICAN: 25
GFR NON-AFRICAN AMERICAN: 21
GLUCOSE BLD-MCNC: 175 MG/DL (ref 70–99)
GLUCOSE BLD-MCNC: 179 MG/DL (ref 70–99)
GLUCOSE BLD-MCNC: 232 MG/DL (ref 70–99)
GLUCOSE BLD-MCNC: 258 MG/DL (ref 70–99)
GLUCOSE BLD-MCNC: 302 MG/DL (ref 70–99)
MAGNESIUM: 2.2 MG/DL (ref 1.8–2.4)
PERFORMED ON: ABNORMAL
PHOSPHORUS: 3 MG/DL (ref 2.5–4.9)
POTASSIUM SERPL-SCNC: 4.2 MMOL/L (ref 3.5–5.1)
SODIUM BLD-SCNC: 135 MMOL/L (ref 136–145)

## 2020-12-22 PROCEDURE — 80069 RENAL FUNCTION PANEL: CPT

## 2020-12-22 PROCEDURE — 6370000000 HC RX 637 (ALT 250 FOR IP): Performed by: INTERNAL MEDICINE

## 2020-12-22 PROCEDURE — 36415 COLL VENOUS BLD VENIPUNCTURE: CPT

## 2020-12-22 PROCEDURE — 6370000000 HC RX 637 (ALT 250 FOR IP): Performed by: NURSE PRACTITIONER

## 2020-12-22 PROCEDURE — 97530 THERAPEUTIC ACTIVITIES: CPT

## 2020-12-22 PROCEDURE — 99232 SBSQ HOSP IP/OBS MODERATE 35: CPT | Performed by: NURSE PRACTITIONER

## 2020-12-22 PROCEDURE — 97116 GAIT TRAINING THERAPY: CPT

## 2020-12-22 PROCEDURE — 83735 ASSAY OF MAGNESIUM: CPT

## 2020-12-22 PROCEDURE — 1200000000 HC SEMI PRIVATE

## 2020-12-22 RX ADMIN — APIXABAN 2.5 MG: 2.5 TABLET, FILM COATED ORAL at 22:07

## 2020-12-22 RX ADMIN — TIZANIDINE 4 MG: 4 TABLET ORAL at 22:08

## 2020-12-22 RX ADMIN — INSULIN GLARGINE 20 UNITS: 100 INJECTION, SOLUTION SUBCUTANEOUS at 22:09

## 2020-12-22 RX ADMIN — MAGNESIUM GLUCONATE 500 MG ORAL TABLET 400 MG: 500 TABLET ORAL at 22:08

## 2020-12-22 RX ADMIN — MAGNESIUM GLUCONATE 500 MG ORAL TABLET 400 MG: 500 TABLET ORAL at 10:06

## 2020-12-22 RX ADMIN — TORSEMIDE 20 MG: 20 TABLET ORAL at 22:08

## 2020-12-22 RX ADMIN — INSULIN LISPRO 3 UNITS: 100 INJECTION, SOLUTION INTRAVENOUS; SUBCUTANEOUS at 22:09

## 2020-12-22 RX ADMIN — ASPIRIN 81 MG: 81 TABLET, COATED ORAL at 10:05

## 2020-12-22 RX ADMIN — LEVOTHYROXINE SODIUM 125 MCG: 0.12 TABLET ORAL at 06:48

## 2020-12-22 RX ADMIN — APIXABAN 2.5 MG: 2.5 TABLET, FILM COATED ORAL at 10:11

## 2020-12-22 RX ADMIN — INSULIN LISPRO 2 UNITS: 100 INJECTION, SOLUTION INTRAVENOUS; SUBCUTANEOUS at 08:07

## 2020-12-22 RX ADMIN — SERTRALINE HYDROCHLORIDE 125 MG: 50 TABLET ORAL at 22:08

## 2020-12-22 RX ADMIN — SPIRONOLACTONE 50 MG: 25 TABLET ORAL at 10:05

## 2020-12-22 RX ADMIN — TORSEMIDE 20 MG: 20 TABLET ORAL at 10:06

## 2020-12-22 RX ADMIN — TIZANIDINE 4 MG: 4 TABLET ORAL at 10:08

## 2020-12-22 RX ADMIN — PANTOPRAZOLE SODIUM 40 MG: 40 TABLET, DELAYED RELEASE ORAL at 10:06

## 2020-12-22 RX ADMIN — ATORVASTATIN CALCIUM 20 MG: 10 TABLET, FILM COATED ORAL at 10:05

## 2020-12-22 RX ADMIN — INSULIN LISPRO 8 UNITS: 100 INJECTION, SOLUTION INTRAVENOUS; SUBCUTANEOUS at 18:46

## 2020-12-22 NOTE — PROGRESS NOTES
were also pertinent to this visit. has a past medical history of Anxiety, Arthritis, Atrial fibrillation ([address] CAD (coronary artery disease), CHF (congestive heart failure) ([address] Chronic pain syndrome, Cirrhosis ([address] [address] Depression, Diabetes mellitus ([address] GERD (gastroesophageal reflux disease), Hyperlipidemia, Hypertension, Kidney disease, Thyroid disease, and Vitamin D deficiency. has a past surgical history that includes Coronary Artery Bypass Graft Maze Procedure (1995); Cholecystectomy; and Breast reduction surgery. Restrictions  Restrictions/Precautions  Restrictions/Precautions: Fall Risk, Up as Tolerated  Position Activity Restriction  Other position/activity restrictions: Dario  Subjective   General  Chart Reviewed: Yes  Response To Previous Treatment: Patient with no complaints from previous session. Family / Caregiver Present: No  Referring Practitioner: Sacha Ricardo MD  Subjective  Subjective: Pt needing ot use the commode. Denies pain          Orientation  Orientation  Overall Orientation Status: Within Normal Limits  Cognition      Objective   Bed mobility  Supine to Sit: Stand by assistance(elevated HOB)  Sit to Supine: Stand by assistance  Transfers  Sit to Stand: Contact guard assistance  Stand to sit: Contact guard assistance  Stand Pivot Transfers: Contact guard assistance  Ambulation  Ambulation?: Yes  More Ambulation?: No  Ambulation 1  Surface: level tile  Device: Standard Walker  Assistance: Contact guard assistance;Stand by assistance  Quality of Gait: Wide AIYANA, mild forward flexed posture, cues to stay within the walker, decreased step length and increased time to perform. Gait Deviations: Slow Cindy;Decreased step height;Decreased step length; Increased AIYANA  Distance: 10'     Balance  Posture: Fair  Sitting - Static: Good  Sitting - Dynamic: Good  Standing - Static: Good  Standing - Dynamic: Fair                  AM-PAC Score  AM-PAC Inpatient Mobility Raw Score : 13 (12/09/20 1035)  AM-PAC Inpatient T-Scale Score : 36.74 (12/09/20 1035)  Mobility Inpatient CMS 0-100% Score: 64.91 (12/09/20 1035)  Mobility Inpatient CMS G-Code Modifier : CL (12/09/20 1035)          Goals  Short term goals  Time Frame for Short term goals: 12/15 goals extended due to patient's continued medical complexity and prolonged hospital stay. Short term goal 1: Pt will complete bed mobility wtih min A. 12/21 MET with HOB elevated; New goal: Pt will complete bed mobility with mod I. 12/22: SBA  Short term goal 2: Pt will sit to stand wtih SBA. 12/22: CGA  Short term goal 3: Pt will ambulate x 5' with RW with CGA. 12/21: MET 8ft with RW and CGA; New goal: Pt will ambulate 15ft with RW and SBA. 12/22: 10' with SW with CGA-SBA  Short term goal 4: Pt will sit in a chair x 60 minutes. 12/21: Pt sat EOB x 8 minutes but refused to transfer to chair at end of session. Short term goal 5: Pt will participate in B LE exercises to improve functional strength and mobility by 12/11. 12/20 continue goal  Patient Goals   Patient goals : none expressed    Plan    Plan  Times per week: 1-2x/week  Current Treatment Recommendations: Strengthening, Balance Training, Endurance Training, Functional Mobility Training, Transfer Training, Gait Training, Positioning  Safety Devices  Type of devices:  All fall risk precautions in place, Bed alarm in place, Call light within reach, Left in bed, Nurse notified  Restraints  Initially in place: No     Therapy Time   Individual Concurrent Group Co-treatment   Time In [facility]         Time Out 1415         Minutes 1500 [address], [address] [facility]

## 2020-12-22 NOTE — PROGRESS NOTES
Brian 82   Daily Progress Note    Admit Date:  12/4/2020  HPI:    Chief Complaint   Patient presents with    Leg Swelling     From [facility]. EMS was called for SOB, pt has dyspnea upon exhertion. Pt states she has gained \"over 100 lbs in the past month since I was here\". Admitted 12/4/2020 from [facility] for shortness of breath. Echo showed EF 55%, severe pulmonary HTN. Troponin elevated. Treated for CHF. Rhythm has been AF, converted to sinus bradycardia 40s 12/18/20. Subjective:  Ms. Mignon Adhikari sitting up in bed, awake, alert, no apparent distress. No chest pain or shortness of breath. Abdominal distention and leg edema improving. Objective:   BP (!) 128/48   Pulse (!) 48   Temp 97.9 °F (36.6 °C) (Axillary)   Resp 16   Ht 5' 5\" (1.651 m)   Wt 260 lb 9.6 oz (118.2 kg)   SpO2 92%   BMI 43.37 kg/m²       Intake/Output Summary (Last 24 hours) at 12/22/2020 1039  Last data filed at 12/22/2020 0500  Gross per 24 hour   Intake 420 ml   Output 1650 ml   Net -1230 ml     Wt Readings from Last 3 Encounters:   12/22/20 260 lb 9.6 oz (118.2 kg)   08/23/20 242 lb (109.8 kg)         ASSESSMENT:   1. CHF, acute on chronic diastolic: Weight down another 3 pounds today (~ 83 lbs since admit?), net -39 L, signs and symptoms improved, diuretic holiday 12/21/20, restarting torsemide 20 mg bid today  2. Afib, paroxysmal: remains in sinus rhythm today  3. Abnormal ECG: troponin level elevate but flat, not consistent with ACS; echo reassuring  4. CAD hx CABG x3 1995: elevated troponin and TWI on ECG, no chest pain. On ASA and statin, no BB due to bradycardia  5. Acute on chronic kidney disease stage 4: nephrology following, Lasix and metolazone stopped for worsening renal function, restarting diuretic with torsemide 20 mg twice daily, labs stable to slightly improved  6. HTN: stable  7. DM  8. HLD  9. Hx CVA  10. Anemia: stable  11. PLAN:  1. Agree with torsemide 20 mg twice daily  2.  Continue TROPONINI in the last 72 hours. Lipids:   Lab Results   Component Value Date    TRIG 54 12/06/2020    HDL 58 12/06/2020    LDLCALC 36 12/06/2020       Cardiac Imaging:    Echo 12/7/2020:   Limited only f/u for LVEF. Normal left ventricular systolic function with ejection fraction of 55-60%. No regional wall motion abnormalites are seen. Septal bounce due to right ventricular pressure and volume overload. The right ventricle is mildly enlarged. Right ventricular systolic function is moderately reduced . Moderate tricuspid regurgitation. Definity echo contrast used. Systolic pulmonic artery pressure (SPAP) is estimated at 67 mmHg consistent   with severe pulmonary hypertension (Right atrial pressure of 8 mmHg). The right atrium is moderately dilated.

## 2020-12-22 NOTE — PROGRESS NOTES
Hospitalist Progress Note      PCP: Braulio Chen MD    Date of Admission: 12/4/2020  Chief Complaint: Bilateral leg edema and weight gain     Hospital course: Admitted with edema.  Bolus Lasix did not work. Joe Lose on Lasix dripKingjayshree Llanos known to have underlying cirrhosis. Seen by cardiology and nephrology.  Lasix drip is currently at 15 mg/h.  Norvasc stopped 2 days ago. Carmell Parents slightly better than a couple days ago.  No chest pain.  Has shortness of breath with exertion, but improving.   Bradycardia has not persisted, though heart rate is on the slow side of normal.  Cardiac electrophysiology has also evaluated.      Subjective: No chest pain, no shortness of breath at rest, no nausea or vomiting at rest.  Subjectively, nothing new overnight., diuresis on hold, abd less distended    Medications:  Reviewed    Infusion Medications    dextrose       Scheduled Medications    torsemide  20 mg Oral BID    spironolactone  50 mg Oral Daily    apixaban  2.5 mg Oral BID    insulin glargine  20 Units Subcutaneous Nightly    miconazole   Topical BID    magnesium oxide  400 mg Oral BID    aspirin  81 mg Oral Daily    ketotifen  1 drop Both Eyes BID    fluticasone  1 spray Each Nostril Daily    atorvastatin  20 mg Oral Daily    levothyroxine  125 mcg Oral Daily    pantoprazole  40 mg Oral Daily    sertraline  125 mg Oral Nightly    sodium chloride flush  10 mL Intravenous 2 times per day    insulin lispro  0-12 Units Subcutaneous TID WC    insulin lispro  0-6 Units Subcutaneous Nightly    tiZANidine  4 mg Oral BID     PRN Meds: magnesium sulfate, tetrahydrozoline, sodium chloride flush, acetaminophen **OR** acetaminophen, polyethylene glycol, promethazine **OR** ondansetron, glucose, dextrose, glucagon (rDNA), dextrose, perflutren lipid microspheres, traMADol, carboxymethylcellulose PF, sodium chloride flush      Intake/Output Summary (Last 24 hours) at 12/22/2020 9237  Last data filed at 12/22/2020 0500  Gross per 24 hour   Intake 420 ml   Output 1650 ml   Net -1230 ml       Physical Exam Performed:    BP (!) 128/48   Pulse (!) 48   Temp 97.9 °F (36.6 °C) (Axillary)   Resp 16   Ht 5' 5\" (1.651 m)   Wt 260 lb 9.6 oz (118.2 kg)   SpO2 92%   BMI 43.37 kg/m²     General appearance: No apparent distress, appears stated age and cooperative.  Pleasant  HEENT: Pupils equal, round, and reactive to light. Conjunctivae/corneas clear. Neck: Supple, with full range of motion. No jugular venous distention. Trachea midline. Respiratory:  Normal respiratory effort. Clear to auscultation, bilaterally without Wheezes/Rhonchi.    Cardiovascular: Regular rate and rhythm with normal S1/S2 without murmurs, rubs or gallops. Abdomen: Soft, non-tender, non-distended with normal bowel sounds.  Obese abdomen  Musculoskeletal: No clubbing, cyanosis.  2+ lower extremity edema improved.  Pitting.  Similar to a couple days ago. Skin: Skin color, texture, turgor normal.  No rashes or lesions. Neurologic:  Neurovascularly intact without any focal sensory/motor deficits. Cranial nerves: II-XII intact, grossly non-focal.  Psychiatric: Alert and oriented, thought content appropriate, normal insight  Capillary Refill: Brisk,< 3 seconds   Peripheral Pulses: +2 palpable, equal bilaterally       Labs:   Recent Labs     12/21/20  0808   WBC 4.4   HGB 7.9*   HCT 25.0*        Recent Labs     12/20/20  0858 12/21/20  0808 12/22/20  0713   * 137 135*   K 4.3 4.6 4.2   CL 90* 92* 94*   CO2 35* 34* 30   BUN 47* 52* 48*   CREATININE 2.4* 2.3* 2.3*   CALCIUM 10.9* 10.7* 10.8*   PHOS 3.1 3.0 3.0     No results for input(s): AST, ALT, BILIDIR, BILITOT, ALKPHOS in the last 72 hours. No results for input(s): INR in the last 72 hours. No results for input(s): Donjody Keens in the last 72 hours.     Urinalysis:      Lab Results   Component Value Date    NITRU Negative 12/05/2020    WBCUA 3-5 12/05/2020    BACTERIA 2+ 08/20/2020    RBCUA 5-10 12/05/2020    BLOODU SMALL 12/05/2020    SPECGRAV 1.025 12/05/2020    GLUCOSEU Negative 12/05/2020       Radiology:  CT ABDOMEN PELVIS WO CONTRAST Additional Contrast? None   Final Result   1. Findings of diffuse volume overload including abdominal and pelvic   ascites, anasarca and findings of congestive heart failure in the lower lungs. 2. Hepatic morphologic features of cirrhosis. No gross hepatic lesion   evident. 3. Splenomegaly, typically associated with portal venous hypertension but   nonspecific. 4. Mild right upper quadrant lymph node enlargement is very likely reactive. XR CHEST PORTABLE   Final Result   Vascular congestion. No acute pulmonary disease. Stable enlargement of cardiac silhouette. Assessment/Plan:    Active Hospital Problems    Diagnosis    Anasarca [R60.1]    Cirrhosis of liver with ascites ([facility] [K74.60, R18.8]    Acute on chronic diastolic CHF (congestive heart failure) (HCC) [I50.33]    Morbid obesity ([facility] [E66.01]    Fluid overload [E87.70]    CHF (congestive heart failure) (HCC) [I50.9]    CAD (coronary artery disease) [I25.10]    Acute kidney injury superimposed on chronic kidney disease ([facility] [N17.9, N18.9]    Essential hypertension [I10]    Type 2 diabetes mellitus, with long-term current use of insulin (HCC) [E11.9, Z79.4]    Stage 3 chronic kidney disease [N18.30]         Acute on chronic diastolic CHF  Edema also complicated by cirrhosis. Continue furosemide drip and spironolactone.  Metolazone also added by nephrology.  Combination diuretics appear to be effective. Remains off Norvasc, most likely permanently. Furosemide drip rate is currently 15 mg/h  Cardiology opinion appreciated.  There is a consideration for ischemic evaluation -defer to cards.  Cardiology to finalize recommendations.   Patient admits to past noncompliance.  Does not want to go to bathroom often and either skips or refuses

## 2020-12-22 NOTE — PROGRESS NOTES
alkalotic may need to use some doses of albumin. I will also start the patient on Aldactone for the time being for potassium conservation. Last admission she was down to 324 pounds at discharge.        Acute Kidney Injury on CKD III:      DIONNE likely due to - cardiorenal syndrome, r/o urinary tract infection, also has cirrhosis of the liver with decompensation  Cr on consultation 1.3   Baseline Cr-1.5 with wide fluctuations. from 4/9  She is followed by Dr. Mary Mackay follow-up was 5/19     UA trace blood ,  otherwise bland  Renal Imaging:- 8/20- no hydronephrosis, no sig abnormality  Echo: 11/18-EF normal moderate TR     Hypertension   BP goal inpatient [phone] systolic inpatient  Known to have atrial fibrillation     Cirrhosis of the liver-with portal  Diabetes mellitus on insulin  Hyperuricemia      DM   Morbid obesity. History of A. fib. Patient history of medication noncompliance. Patient is a full code.     ROS:     Positives Listed Bold. All other remaining systems are negative. Constitutional:  fever, chills, weakness, weight change, fatigue,      Skin:  rash, pruritus, hair loss, bruising, dry skin, petechiae. Head, Face, Neck   headaches, swelling,  cervical adenopathy. Respiratory: shortness of breath, cough, or wheezing  Cardiovascular: chest pain, palpitations, dizzy, edema  Gastrointestinal: nausea, vomiting, diarrhea, constipation,belly pain    Yellow skin, blood in stool  Musculoskeletal:  back pain, muscle weakness, gait problems,       joint pain or swelling. Genitourinary:  dysuria, poor urine flow, flank pain, blood in urine  Neurologic:  vertigo, TIA'S, syncope, seizures, focal weakness  Psychosocial:  insomnia, anxiety, or depression. Additional positive findings: -     PMH:   Past medical history, surgical history, social history, family history are reviewed and updated as appropriate. Reviewed current medication list.   Allergies reviewed and updated as needed.      PE: Vitals:    12/22/20 1611   BP: (!) 108/57   Pulse: 50   Resp: 16   Temp: 98.1 °F (36.7 °C)   SpO2: 92%       General appearance: femlae in NAD, fully alert and oriented. Comfortable. HEENT: EOM intact, no icterus. Trachea is midline. Neck : No masses, appears symmetrical, no JVD, trachea is midline  Respiratory: Respiratory effort appears normal, bilateral equal chest rise  Cardiovascular: Ausculation shows RR, ++ edema  Abdomen: No visible mass or tenderness, non distended. Musculoskeletal:  Joints with no swelling or deformity. Strength grossly normal  Skin:no rashes, ulcers, induration, no jaundice. Neuro: face symmetric, no focal deficits. Appropriate responses.  CN 2-12 grossly intact      Lab Results   Component Value Date    CREATININE 2.3 (H) 12/22/2020    BUN 48 (H) 12/22/2020     (L) 12/22/2020    K 4.2 12/22/2020    CL 94 (L) 12/22/2020    CO2 30 12/22/2020      Lab Results   Component Value Date    WBC 4.4 12/21/2020    HGB 7.9 (L) 12/21/2020    HCT 25.0 (L) 12/21/2020    MCV 82.2 12/21/2020     12/21/2020     Lab Results   Component Value Date    CALCIUM 10.8 (H) 12/22/2020    PHOS 3.0 12/22/2020

## 2020-12-22 NOTE — CARE COORDINATION
Continues with fluid overload issues. Uncertain of discharge date. Plan is still return to LTC at [facility]. Will follow.     Rogelio Palma RN

## 2020-12-23 LAB
ALBUMIN SERPL-MCNC: 3.7 G/DL (ref 3.4–5)
ANION GAP SERPL CALCULATED.3IONS-SCNC: 12 MMOL/L (ref 3–16)
BASOPHILS ABSOLUTE: 0.1 K/UL (ref 0–0.2)
BASOPHILS RELATIVE PERCENT: 1.5 %
BUN BLDV-MCNC: 52 MG/DL (ref 7–20)
CALCIUM IONIZED: 1.23 MMOL/L (ref 1.12–1.32)
CALCIUM SERPL-MCNC: 10.3 MG/DL (ref 8.3–10.6)
CHLORIDE BLD-SCNC: 97 MMOL/L (ref 99–110)
CO2: 29 MMOL/L (ref 21–32)
CREAT SERPL-MCNC: 2.1 MG/DL (ref 0.6–1.2)
EOSINOPHILS ABSOLUTE: 0.1 K/UL (ref 0–0.6)
EOSINOPHILS RELATIVE PERCENT: 3.1 %
GFR AFRICAN AMERICAN: 28
GFR NON-AFRICAN AMERICAN: 23
GLUCOSE BLD-MCNC: 216 MG/DL (ref 70–99)
GLUCOSE BLD-MCNC: 217 MG/DL (ref 70–99)
GLUCOSE BLD-MCNC: 218 MG/DL (ref 70–99)
GLUCOSE BLD-MCNC: 250 MG/DL (ref 70–99)
GLUCOSE BLD-MCNC: 265 MG/DL (ref 70–99)
HCT VFR BLD CALC: 24.2 % (ref 36–48)
HEMOGLOBIN: 7.7 G/DL (ref 12–16)
LYMPHOCYTES ABSOLUTE: 0.5 K/UL (ref 1–5.1)
LYMPHOCYTES RELATIVE PERCENT: 10.8 %
MAGNESIUM: 2.5 MG/DL (ref 1.8–2.4)
MCH RBC QN AUTO: 26.2 PG (ref 26–34)
MCHC RBC AUTO-ENTMCNC: 31.7 G/DL (ref 31–36)
MCV RBC AUTO: 82.8 FL (ref 80–100)
MONOCYTES ABSOLUTE: 0.3 K/UL (ref 0–1.3)
MONOCYTES RELATIVE PERCENT: 6.7 %
NEUTROPHILS ABSOLUTE: 3.5 K/UL (ref 1.7–7.7)
NEUTROPHILS RELATIVE PERCENT: 77.9 %
PARATHYROID HORMONE INTACT: 210.8 PG/ML (ref 14–72)
PDW BLD-RTO: 19.6 % (ref 12.4–15.4)
PERFORMED ON: ABNORMAL
PH VENOUS: 7.45 (ref 7.35–7.45)
PHOSPHORUS: 3.4 MG/DL (ref 2.5–4.9)
PLATELET # BLD: 141 K/UL (ref 135–450)
PMV BLD AUTO: 8 FL (ref 5–10.5)
POTASSIUM SERPL-SCNC: 4.2 MMOL/L (ref 3.5–5.1)
PRO-BNP: 1506 PG/ML (ref 0–124)
RBC # BLD: 2.92 M/UL (ref 4–5.2)
SODIUM BLD-SCNC: 138 MMOL/L (ref 136–145)
VITAMIN D 25-HYDROXY: 35.4 NG/ML
WBC # BLD: 4.5 K/UL (ref 4–11)

## 2020-12-23 PROCEDURE — 6370000000 HC RX 637 (ALT 250 FOR IP): Performed by: INTERNAL MEDICINE

## 2020-12-23 PROCEDURE — 99232 SBSQ HOSP IP/OBS MODERATE 35: CPT | Performed by: NURSE PRACTITIONER

## 2020-12-23 PROCEDURE — 2580000003 HC RX 258: Performed by: NURSE PRACTITIONER

## 2020-12-23 PROCEDURE — 1200000000 HC SEMI PRIVATE

## 2020-12-23 PROCEDURE — 82306 VITAMIN D 25 HYDROXY: CPT

## 2020-12-23 PROCEDURE — 83880 ASSAY OF NATRIURETIC PEPTIDE: CPT

## 2020-12-23 PROCEDURE — 83735 ASSAY OF MAGNESIUM: CPT

## 2020-12-23 PROCEDURE — 80069 RENAL FUNCTION PANEL: CPT

## 2020-12-23 PROCEDURE — 82330 ASSAY OF CALCIUM: CPT

## 2020-12-23 PROCEDURE — 6370000000 HC RX 637 (ALT 250 FOR IP): Performed by: NURSE PRACTITIONER

## 2020-12-23 PROCEDURE — 36415 COLL VENOUS BLD VENIPUNCTURE: CPT

## 2020-12-23 PROCEDURE — 85025 COMPLETE CBC W/AUTO DIFF WBC: CPT

## 2020-12-23 PROCEDURE — 82652 VIT D 1 25-DIHYDROXY: CPT

## 2020-12-23 PROCEDURE — 83970 ASSAY OF PARATHORMONE: CPT

## 2020-12-23 RX ORDER — TORSEMIDE 20 MG/1
40 TABLET ORAL 2 TIMES DAILY
Status: DISCONTINUED | OUTPATIENT
Start: 2020-12-23 | End: 2020-12-24 | Stop reason: HOSPADM

## 2020-12-23 RX ADMIN — TIZANIDINE 4 MG: 4 TABLET ORAL at 09:24

## 2020-12-23 RX ADMIN — KETOTIFEN FUMARATE 1 DROP: 0.35 SOLUTION/ DROPS OPHTHALMIC at 09:26

## 2020-12-23 RX ADMIN — APIXABAN 2.5 MG: 2.5 TABLET, FILM COATED ORAL at 09:23

## 2020-12-23 RX ADMIN — INSULIN LISPRO 4 UNITS: 100 INJECTION, SOLUTION INTRAVENOUS; SUBCUTANEOUS at 09:27

## 2020-12-23 RX ADMIN — APIXABAN 2.5 MG: 2.5 TABLET, FILM COATED ORAL at 21:34

## 2020-12-23 RX ADMIN — SPIRONOLACTONE 50 MG: 25 TABLET ORAL at 09:23

## 2020-12-23 RX ADMIN — TIZANIDINE 4 MG: 4 TABLET ORAL at 21:34

## 2020-12-23 RX ADMIN — LEVOTHYROXINE SODIUM 125 MCG: 0.12 TABLET ORAL at 05:34

## 2020-12-23 RX ADMIN — ASPIRIN 81 MG: 81 TABLET, COATED ORAL at 09:24

## 2020-12-23 RX ADMIN — SODIUM CHLORIDE, PRESERVATIVE FREE 10 ML: 5 INJECTION INTRAVENOUS at 09:24

## 2020-12-23 RX ADMIN — INSULIN LISPRO 4 UNITS: 100 INJECTION, SOLUTION INTRAVENOUS; SUBCUTANEOUS at 18:05

## 2020-12-23 RX ADMIN — SODIUM CHLORIDE, PRESERVATIVE FREE 10 ML: 5 INJECTION INTRAVENOUS at 21:35

## 2020-12-23 RX ADMIN — INSULIN LISPRO 2 UNITS: 100 INJECTION, SOLUTION INTRAVENOUS; SUBCUTANEOUS at 21:34

## 2020-12-23 RX ADMIN — TORSEMIDE 40 MG: 20 TABLET ORAL at 21:34

## 2020-12-23 RX ADMIN — TORSEMIDE 20 MG: 20 TABLET ORAL at 09:24

## 2020-12-23 RX ADMIN — FLUTICASONE PROPIONATE 1 SPRAY: 50 SPRAY, METERED NASAL at 09:26

## 2020-12-23 RX ADMIN — MICONAZOLE NITRATE: 20 POWDER TOPICAL at 21:36

## 2020-12-23 RX ADMIN — ATORVASTATIN CALCIUM 20 MG: 10 TABLET, FILM COATED ORAL at 09:23

## 2020-12-23 RX ADMIN — SERTRALINE HYDROCHLORIDE 125 MG: 50 TABLET ORAL at 21:34

## 2020-12-23 RX ADMIN — MICONAZOLE NITRATE: 20 POWDER TOPICAL at 09:28

## 2020-12-23 RX ADMIN — KETOTIFEN FUMARATE 1 DROP: 0.35 SOLUTION/ DROPS OPHTHALMIC at 21:36

## 2020-12-23 RX ADMIN — INSULIN LISPRO 6 UNITS: 100 INJECTION, SOLUTION INTRAVENOUS; SUBCUTANEOUS at 14:06

## 2020-12-23 RX ADMIN — PANTOPRAZOLE SODIUM 40 MG: 40 TABLET, DELAYED RELEASE ORAL at 09:23

## 2020-12-23 RX ADMIN — INSULIN GLARGINE 20 UNITS: 100 INJECTION, SOLUTION SUBCUTANEOUS at 21:35

## 2020-12-23 ASSESSMENT — PAIN SCALES - GENERAL: PAINLEVEL_OUTOF10: 0

## 2020-12-23 NOTE — PROGRESS NOTES
Eliseo   Daily Progress Note    Admit Date:  12/4/2020  HPI:    Chief Complaint   Patient presents with    Leg Swelling     From [facility]. EMS was called for SOB, pt has dyspnea upon exhertion. Pt states she has gained \"over 100 lbs in the past month since I was here\". Admitted 12/4/2020 from [facility] for shortness of breath. Echo showed EF 55%, severe pulmonary HTN. Troponin elevated. Treated for CHF. Rhythm has been AF, converted to sinus bradycardia 40s 12/18/20. Subjective:  Ms. Clarita Carney sitting up in bed, denies pain or shortness of breath. Still with edema. She has been refusing to have mora removed. Removed with RN now. Objective:   BP (!) 121/43   Pulse 73   Temp 97.4 °F (36.3 °C) (Oral)   Resp 16   Ht 5' 5\" (1.651 m)   Wt 259 lb (117.5 kg)   SpO2 94%   BMI 43.10 kg/m²       Intake/Output Summary (Last 24 hours) at 12/23/2020 1135  Last data filed at 12/23/2020 1133  Gross per 24 hour   Intake 130 ml   Output 1750 ml   Net -1620 ml     Wt Readings from Last 3 Encounters:   12/23/20 259 lb (117.5 kg)   08/23/20 242 lb (109.8 kg)         ASSESSMENT:   1. CHF, acute on chronic diastolic: Weight down another 1 pounds today (~ 84 lbs since admit?), net -37 L, doubt that she will maintain neg fluid balance with current torsemide dose  2. Afib, paroxysmal: remains in sinus rhythm   3. Abnormal ECG: troponin level elevate but flat, not consistent with ACS; echo reassuring  4. CAD hx CABG x3 1995: elevated troponin and TWI on ECG, no chest pain. On ASA and statin, no BB due to bradycardia  5. Acute on chronic kidney disease stage 4: nephrology following, Lasix and metolazone stopped for worsening renal function, torsemide 20 mg twice daily, labs stable   6. HTN: stable  7. DM  8. HLD  9. Hx CVA  10. Anemia: stable  11. PLAN:  1. I feel that she needs a higher dose of torsemide upon discharge. Defer dosing to nephrology  2. Continue spironolactone 50 mg daily  3.  Continue to monitor daily weights, strict I/O, daily renal panel   4. Continue Eliquis (renal dose) for anticoagulation with PAF, currently in sinus rhythm/ sinus bradycardia    5. Continue aspirin and statin for CAD    6. Okay for discharge from a cardiac perspective. Being discharged back to her facility. Will arrange for office visit follow-up in 3 to 4 weeks. Michael Taveras, APRN - CNP, 12/23/2020, 11:35 AM  [address] 82   [phone]       Telemetry: SR/SB 40-60's  NYHA: III    Physical Exam:  General:  Awake, alert, NAD  Skin:  Warm and dry  Neck:  JVP 9-10 cm  Chest: Bibasilar Rales posteriorly  Cardiovascular:  RRR, normal W2U8, soft systolic murmur, no RG  Abdomen:  Soft, nontender, +bowel sounds, + fluid wave, + pannus with induration  Extremities: 1+ BLE edema, mildly worse        Medications:    torsemide  20 mg Oral BID    spironolactone  50 mg Oral Daily    apixaban  2.5 mg Oral BID    insulin glargine  20 Units Subcutaneous Nightly    miconazole   Topical BID    magnesium oxide  400 mg Oral BID    aspirin  81 mg Oral Daily    ketotifen  1 drop Both Eyes BID    fluticasone  1 spray Each Nostril Daily    atorvastatin  20 mg Oral Daily    levothyroxine  125 mcg Oral Daily    pantoprazole  40 mg Oral Daily    sertraline  125 mg Oral Nightly    sodium chloride flush  10 mL Intravenous 2 times per day    insulin lispro  0-12 Units Subcutaneous TID WC    insulin lispro  0-6 Units Subcutaneous Nightly    tiZANidine  4 mg Oral BID      dextrose         Lab Data: Lab results independently reviewed by myself 12/21/20   CBC:   Recent Labs     12/21/20  0808 12/23/20  0539   WBC 4.4 4.5   HGB 7.9* 7.7*    141     BMP:    Recent Labs     12/21/20  0808 12/22/20  0713 12/23/20  0538    135* 138   K 4.6 4.2 4.2   CO2 34* 30 29   BUN 52* 48* 52*   CREATININE 2.3* 2.3* 2.1*     INR:  No results for input(s): INR in the last 72 hours.   BNP:    Recent Labs     12/23/20  0538

## 2020-12-23 NOTE — PROGRESS NOTES
[facility]                                                 [phone]                                      [facility] [employer]                                        Inpatient Progress note    Summary:   Anna Fatima is being seen by nephrology for DIONNE on CKD. Admitted with SOB. 70-year-old  female with a past medical history of hypertension, hyperlipidemia, diabetes mellitus, morbid obesity, history of congestive heart failure, diastolic heart failure, coronary artery disease, atrial fibrillation, bilateral lower extremity edema previous history of cellulitis, and has had multiple admissions for weight gain and volume overload. Interval History  Seen and examined at bedside. Feeling fatigued today. Does not want catheter out but counseled. We will do a voiding trial today. Is comfortable but has to sit up in bed. Edema still there but improved    Vitally stable   90%on room air. Off lasix gtt / metolazone.  >309 >298 >302  > 271--> 260 lbs   UO 2.1 L    lytes stable. BUN 37 > 41 > 47> 52> 48  Cr 1.8 > 1.9 > 2.4> 2.4> 2.3  Calcium high at 10.8. Plan: Torsemide 40 mg twice a day. No metolazone. High calcium can be because of metolazone usage.  and vitamin D level 35. PTH should have been suppressed. May have hyperparathyroidism but can be evaluated as outpt. No calcium or vitamin D supplements for now. Keep systolic blood pressure above 110 at all times. Voiding trial today. .  - strict Io and weights.   - ok to d/c with weekly labs and f/u with us in 2--3 weeks. [facility] thanks you for the opportunity to serve this patient. Please call with any questions of concerns. Praneeth Wu MD  [facility]  [address], [address]  Fax: [phone]  Office: [phone]    Assessment:   Generalized anasarca/massive volume overload. Tells me her weight is 220 pounds dry. Currently is 335 pounds.   Started on Lasix drip I will add metolazone. Watch for hypokalemia. She may get alkalotic may need to use some doses of albumin. I will also start the patient on Aldactone for the time being for potassium conservation. Last admission she was down to 324 pounds at discharge.        Acute Kidney Injury on CKD III:      DIONNE likely due to - cardiorenal syndrome, r/o urinary tract infection, also has cirrhosis of the liver with decompensation  Cr on consultation 1.3   Baseline Cr-1.5 with wide fluctuations. from 4/9  She is followed by Dr. Francesca Gutierrez follow-up was 5/19     UA trace blood ,  otherwise bland  Renal Imaging:- 8/20- no hydronephrosis, no sig abnormality  Echo: 11/18-EF normal moderate TR     Hypertension   BP goal inpatient [phone] systolic inpatient  Known to have atrial fibrillation     Cirrhosis of the liver-with portal  Diabetes mellitus on insulin  Hyperuricemia      DM   Morbid obesity. History of A. fib. Patient history of medication noncompliance. Patient is a full code.     ROS:     Positives Listed Bold. All other remaining systems are negative. Constitutional:  fever, chills, weakness, weight change, fatigue,      Skin:  rash, pruritus, hair loss, bruising, dry skin, petechiae. Head, Face, Neck   headaches, swelling,  cervical adenopathy. Respiratory: shortness of breath, cough, or wheezing  Cardiovascular: chest pain, palpitations, dizzy, edema  Gastrointestinal: nausea, vomiting, diarrhea, constipation,belly pain    Yellow skin, blood in stool  Musculoskeletal:  back pain, muscle weakness, gait problems,       joint pain or swelling. Genitourinary:  dysuria, poor urine flow, flank pain, blood in urine  Neurologic:  vertigo, TIA'S, syncope, seizures, focal weakness  Psychosocial:  insomnia, anxiety, or depression. Additional positive findings: -     PMH:   Past medical history, surgical history, social history, family history are reviewed and updated as appropriate.     Reviewed current medication list.   Allergies reviewed and updated as needed. PE:   Vitals:    12/23/20 0915   BP: (!) 121/43   Pulse: 73   Resp: 16   Temp: 97.4 °F (36.3 °C)   SpO2: 94%       General appearance: femlae in NAD, fully alert and oriented. Comfortable. HEENT: EOM intact, no icterus. Trachea is midline. Neck : No masses, appears symmetrical, no JVD, trachea is midline  Respiratory: Respiratory effort appears normal, bilateral equal chest rise  Cardiovascular: Ausculation shows RR, ++ edema  Abdomen: No visible mass or tenderness, non distended. Musculoskeletal:  Joints with no swelling or deformity. Strength grossly normal  Skin:no rashes, ulcers, induration, no jaundice. Neuro: face symmetric, no focal deficits. Appropriate responses.  CN 2-12 grossly intact      Lab Results   Component Value Date    CREATININE 2.1 (H) 12/23/2020    BUN 52 (H) 12/23/2020     12/23/2020    K 4.2 12/23/2020    CL 97 (L) 12/23/2020    CO2 29 12/23/2020      Lab Results   Component Value Date    WBC 4.5 12/23/2020    HGB 7.7 (L) 12/23/2020    HCT 24.2 (L) 12/23/2020    MCV 82.8 12/23/2020     12/23/2020     Lab Results   Component Value Date    .8 (H) 12/23/2020    CALCIUM 10.3 12/23/2020    CAION 1.23 12/23/2020    PHOS 3.4 12/23/2020

## 2020-12-23 NOTE — PROGRESS NOTES
Patient adamantly refused to allow RN to take mora out after multiple attempts of discontinuing Mora catheter. Explained why it needs to come out, but crossed legs and refused. Will continue to attempt to remove.

## 2020-12-23 NOTE — PROGRESS NOTES
Hospitalist Progress Note      PCP: Tony Lion MD    Date of Admission: 12/4/2020      Chief Complaint: Bilateral leg edema and weight gain     Hospital course: Admitted with edema.  Bolus Lasix did not work. Amber Colvin on Lasix dripEnos Sera known to have underlying cirrhosis. Seen by cardiology and nephrology.  Lasix drip is currently at 15 mg/h.  Norvasc stopped 2 days ago. Shailesh Husbands slightly better than a couple days ago.  No chest pain.  Has shortness of breath with exertion, but improving.   Bradycardia has not persisted, though heart rate is on the slow side of normal.  Cardiac electrophysiology has also evaluated.      Subjective: No chest pain, no shortness of breath at rest, no nausea or vomiting at rest.  Subjectively, nothing new overnight., diuresis on hold, abd less distended       Medications:  Reviewed    Infusion Medications    dextrose       Scheduled Medications    torsemide  20 mg Oral BID    spironolactone  50 mg Oral Daily    apixaban  2.5 mg Oral BID    insulin glargine  20 Units Subcutaneous Nightly    miconazole   Topical BID    magnesium oxide  400 mg Oral BID    aspirin  81 mg Oral Daily    ketotifen  1 drop Both Eyes BID    fluticasone  1 spray Each Nostril Daily    atorvastatin  20 mg Oral Daily    levothyroxine  125 mcg Oral Daily    pantoprazole  40 mg Oral Daily    sertraline  125 mg Oral Nightly    sodium chloride flush  10 mL Intravenous 2 times per day    insulin lispro  0-12 Units Subcutaneous TID WC    insulin lispro  0-6 Units Subcutaneous Nightly    tiZANidine  4 mg Oral BID     PRN Meds: magnesium sulfate, tetrahydrozoline, sodium chloride flush, acetaminophen **OR** acetaminophen, polyethylene glycol, promethazine **OR** ondansetron, glucose, dextrose, glucagon (rDNA), dextrose, perflutren lipid microspheres, traMADol, carboxymethylcellulose PF, sodium chloride flush      Intake/Output Summary (Last 24 hours) at 12/23/2020 6164  Last data filed at 12/23/2020 0353  Gross per 24 hour   Intake --   Output 1450 ml   Net -1450 ml       Physical Exam Performed:    /68   Pulse 63   Temp 97.8 °F (36.6 °C) (Oral)   Resp 17   Ht 5' 5\" (1.651 m)   Wt 259 lb (117.5 kg)   SpO2 91%   BMI 43.10 kg/m²     General appearance: No apparent distress, appears stated age and cooperative.  Pleasant  HEENT: Pupils equal, round, and reactive to light. Conjunctivae/corneas clear. Neck: Supple, with full range of motion. No jugular venous distention. Trachea midline. Respiratory:  Normal respiratory effort. Clear to auscultation, bilaterally without Wheezes/Rhonchi.    Cardiovascular: Regular rate and rhythm with normal S1/S2 without murmurs, rubs or gallops. Abdomen: Soft, non-tender, non-distended with normal bowel sounds.  Obese abdomen  Musculoskeletal: No clubbing, cyanosis.  2+ lower extremity edema improved.  Pitting.  improved from a couple days ago. Skin: Skin color, texture, turgor normal.  No rashes or lesions. Neurologic:  Neurovascularly intact without any focal sensory/motor deficits. Cranial nerves: II-XII intact, grossly non-focal.  Psychiatric: Alert and oriented, thought content appropriate, normal insight  Capillary Refill: Brisk,< 3 seconds   Peripheral Pulses: +2 palpable, equal bilaterally        Labs:   Recent Labs     12/21/20  0808 12/23/20  0539   WBC 4.4 4.5   HGB 7.9* 7.7*   HCT 25.0* 24.2*    141     Recent Labs     12/21/20  0808 12/22/20  0713 12/23/20  0538    135* 138   K 4.6 4.2 4.2   CL 92* 94* 97*   CO2 34* 30 29   BUN 52* 48* 52*   CREATININE 2.3* 2.3* 2.1*   CALCIUM 10.7* 10.8* 10.3   PHOS 3.0 3.0 3.4     No results for input(s): AST, ALT, BILIDIR, BILITOT, ALKPHOS in the last 72 hours. No results for input(s): INR in the last 72 hours. No results for input(s): Opal Lowers in the last 72 hours.     Urinalysis:      Lab Results   Component Value Date    NITRU Negative 12/05/2020    WBCUA 3-5 12/05/2020 BACTERIA 2+ 08/20/2020    RBCUA 5-10 12/05/2020    BLOODU SMALL 12/05/2020    SPECGRAV 1.025 12/05/2020    GLUCOSEU Negative 12/05/2020       Radiology:  CT ABDOMEN PELVIS WO CONTRAST Additional Contrast? None   Final Result   1. Findings of diffuse volume overload including abdominal and pelvic   ascites, anasarca and findings of congestive heart failure in the lower lungs. 2. Hepatic morphologic features of cirrhosis. No gross hepatic lesion   evident. 3. Splenomegaly, typically associated with portal venous hypertension but   nonspecific. 4. Mild right upper quadrant lymph node enlargement is very likely reactive. XR CHEST PORTABLE   Final Result   Vascular congestion. No acute pulmonary disease. Stable enlargement of cardiac silhouette. Assessment/Plan:    Active Hospital Problems    Diagnosis    Anasarca [R60.1]    Cirrhosis of liver with ascites ([facility] [K74.60, R18.8]    Acute on chronic diastolic CHF (congestive heart failure) (HCC) [I50.33]    Morbid obesity ([facility] [E66.01]    Fluid overload [E87.70]    CHF (congestive heart failure) (HCC) [I50.9]    CAD (coronary artery disease) [I25.10]    Acute kidney injury superimposed on chronic kidney disease ([facility] [N17.9, N18.9]    Essential hypertension [I10]    Type 2 diabetes mellitus, with long-term current use of insulin (HCC) [E11.9, Z79.4]    Stage 3 chronic kidney disease [N18.30]     Acute on chronic diastolic CHF  Edema also complicated by cirrhosis. Continue furosemide drip and spironolactone.  Metolazone also added by nephrology.  Combination diuretics appear to be effective. Remains off Norvasc, most likely permanently. Furosemide drip rate is currently 15 mg/h  Cardiology opinion appreciated.  There is a consideration for ischemic evaluation -defer to cards.  Cardiology to finalize recommendations.   Patient admits to past noncompliance.  Does not want to go to bathroom often and either skips or refuses diuretics frequently. Overall, stable with continued diuresis and negative fluid balance.     Bradycardia  -Not on negative chronotropic medications  -Continued to monitor on telemetry. Current heart rate is low 50s     Acute kidney injury  Creatinine monitored, elevated as expected with aggressive diuresis.  Not critical  Nephrology input appreciated  Monitoring daily basic metabolic panel     Paroxysmal atrial fibrillation  Heart rate is controlled and stable  On Eliquis for stroke prophylaxis  Cardiology planning event monitor to assess arrhythmia burden.     Diabetes mellitus type 2  Current blood sugar readings are acceptable.  Continue with same dose of insulin.     Chronic normocytic anemia  Iron studies are consistent with iron deficiency.   -given total of 1 g IV Venofer infusion.  Orders written  No signs of active blood loss, but patient is on anticoagulation.  Could have last blood slowly over period of time. Last hemoglobin is 7.8 from yesterday.  No need for transfusion.  Was Receiving IV iron     Dyslipidemia  Continue statin     Morbid obesity  Body mass index is 21.49 kg/m².  Complicating assessment and treatment. Placing patient at risk for multiple co-morbidities as well as early death and contributing to the patient's presentation. Counseled on weight loss.  Noted BMI is probably inaccurate due to extensive edema.  However, patient is undoubtedly overweight.     DVT Prophylaxis: on eliquis  Diet: DIET LOW SODIUM 2 GM; 1500 ml  Code Status: Full Code    PT/OT Eval Status: rec ecf with pt/ot    Dispo - unclear, pending nephro/cards recs, renal fcn stable, possibly 12/24    Tenisha Pete MD

## 2020-12-24 VITALS
BODY MASS INDEX: 43.15 KG/M2 | OXYGEN SATURATION: 91 % | HEART RATE: 35 BPM | TEMPERATURE: 98.1 F | HEIGHT: 65 IN | RESPIRATION RATE: 10 BRPM | WEIGHT: 259 LBS | DIASTOLIC BLOOD PRESSURE: 35 MMHG | SYSTOLIC BLOOD PRESSURE: 109 MMHG

## 2020-12-24 LAB
ALBUMIN SERPL-MCNC: 4 G/DL (ref 3.4–5)
ANION GAP SERPL CALCULATED.3IONS-SCNC: 11 MMOL/L (ref 3–16)
BUN BLDV-MCNC: 53 MG/DL (ref 7–20)
CALCIUM SERPL-MCNC: 10.7 MG/DL (ref 8.3–10.6)
CHLORIDE BLD-SCNC: 94 MMOL/L (ref 99–110)
CO2: 28 MMOL/L (ref 21–32)
CREAT SERPL-MCNC: 2.2 MG/DL (ref 0.6–1.2)
GFR AFRICAN AMERICAN: 27
GFR NON-AFRICAN AMERICAN: 22
GLUCOSE BLD-MCNC: 158 MG/DL (ref 70–99)
GLUCOSE BLD-MCNC: 198 MG/DL (ref 70–99)
GLUCOSE BLD-MCNC: 266 MG/DL (ref 70–99)
MAGNESIUM: 2.2 MG/DL (ref 1.8–2.4)
PERFORMED ON: ABNORMAL
PERFORMED ON: ABNORMAL
PHOSPHORUS: 2.9 MG/DL (ref 2.5–4.9)
POTASSIUM SERPL-SCNC: 3.8 MMOL/L (ref 3.5–5.1)
SARS-COV-2, NAAT: NOT DETECTED
SODIUM BLD-SCNC: 133 MMOL/L (ref 136–145)
VITAMIN D 1,25-DIHYDROXY: 13.4 PG/ML (ref 19.9–79.3)

## 2020-12-24 PROCEDURE — 6370000000 HC RX 637 (ALT 250 FOR IP): Performed by: INTERNAL MEDICINE

## 2020-12-24 PROCEDURE — 6370000000 HC RX 637 (ALT 250 FOR IP): Performed by: NURSE PRACTITIONER

## 2020-12-24 PROCEDURE — 36415 COLL VENOUS BLD VENIPUNCTURE: CPT

## 2020-12-24 PROCEDURE — 2580000003 HC RX 258: Performed by: NURSE PRACTITIONER

## 2020-12-24 PROCEDURE — U0002 COVID-19 LAB TEST NON-CDC: HCPCS

## 2020-12-24 PROCEDURE — 83735 ASSAY OF MAGNESIUM: CPT

## 2020-12-24 PROCEDURE — 80069 RENAL FUNCTION PANEL: CPT

## 2020-12-24 PROCEDURE — 51798 US URINE CAPACITY MEASURE: CPT

## 2020-12-24 RX ORDER — TIZANIDINE 4 MG/1
4 TABLET ORAL 2 TIMES DAILY
Qty: 10 TABLET | Refills: 0
Start: 2020-12-24

## 2020-12-24 RX ORDER — TORSEMIDE 20 MG/1
40 TABLET ORAL 2 TIMES DAILY
Qty: 30 TABLET | Refills: 1
Start: 2020-12-24 | End: 2022-04-20

## 2020-12-24 RX ORDER — PANTOPRAZOLE SODIUM 40 MG/1
40 TABLET, DELAYED RELEASE ORAL DAILY
Qty: 30 TABLET | Refills: 0
Start: 2020-12-25

## 2020-12-24 RX ORDER — CARBOXYMETHYLCELLULOSE SODIUM 10 MG/ML
1 GEL OPHTHALMIC PRN
Qty: 1 BOTTLE | Refills: 0
Start: 2020-12-24

## 2020-12-24 RX ORDER — SPIRONOLACTONE 50 MG/1
50 TABLET, FILM COATED ORAL DAILY
Qty: 30 TABLET | Refills: 0
Start: 2020-12-25

## 2020-12-24 RX ORDER — ASPIRIN 81 MG/1
81 TABLET ORAL DAILY
Qty: 30 TABLET | Refills: 0
Start: 2020-12-25 | End: 2021-01-22

## 2020-12-24 RX ADMIN — SPIRONOLACTONE 50 MG: 25 TABLET ORAL at 10:30

## 2020-12-24 RX ADMIN — TORSEMIDE 40 MG: 20 TABLET ORAL at 10:30

## 2020-12-24 RX ADMIN — LEVOTHYROXINE SODIUM 125 MCG: 0.12 TABLET ORAL at 06:46

## 2020-12-24 RX ADMIN — INSULIN LISPRO 2 UNITS: 100 INJECTION, SOLUTION INTRAVENOUS; SUBCUTANEOUS at 10:30

## 2020-12-24 RX ADMIN — APIXABAN 2.5 MG: 2.5 TABLET, FILM COATED ORAL at 10:30

## 2020-12-24 RX ADMIN — ASPIRIN 81 MG: 81 TABLET, COATED ORAL at 10:29

## 2020-12-24 RX ADMIN — PANTOPRAZOLE SODIUM 40 MG: 40 TABLET, DELAYED RELEASE ORAL at 10:30

## 2020-12-24 RX ADMIN — SODIUM CHLORIDE, PRESERVATIVE FREE 10 ML: 5 INJECTION INTRAVENOUS at 10:31

## 2020-12-24 RX ADMIN — MICONAZOLE NITRATE: 20 POWDER TOPICAL at 11:50

## 2020-12-24 RX ADMIN — ATORVASTATIN CALCIUM 20 MG: 10 TABLET, FILM COATED ORAL at 10:29

## 2020-12-24 RX ADMIN — TIZANIDINE 4 MG: 4 TABLET ORAL at 10:29

## 2020-12-24 RX ADMIN — KETOTIFEN FUMARATE 1 DROP: 0.35 SOLUTION/ DROPS OPHTHALMIC at 09:00

## 2020-12-24 ASSESSMENT — PAIN SCALES - GENERAL
PAINLEVEL_OUTOF10: 0
PAINLEVEL_OUTOF10: 0

## 2020-12-24 NOTE — PROGRESS NOTES
[facility]                                                 [phone]                                      [facility]. "[employer]                                        Inpatient Progress note    Summary:   Stephan David is being seen by nephrology for DIONNE on CKD. Admitted with SOB. 78-year-old  female with a past medical history of hypertension, hyperlipidemia, diabetes mellitus, morbid obesity, history of congestive heart failure, diastolic heart failure, coronary artery disease, atrial fibrillation, bilateral lower extremity edema previous history of cellulitis, and has had multiple admissions for weight gain and volume overload. Interval History  Seen and examined at bedside. Feeling fatigued , tells me she would not mind staying in the hospital another day. Labs reviewed with the pt   Bishop out. PVR to be checked. Urinated 4--5 times last night all incontinent episodes. Not measured. Edema still there but improved    Vitally stable    >309 >298 >302  > 271--> 259 lbs    lytes stable. Cr 1.8 > 1.9 > 2.4> 2.4> 2.2  Calcium high at 10.7    Plan: Torsemide 40 mg twice a day. No metolazone at discharge    and vitamin D level 35. PTH should have been suppressed. May have hyperparathyroidism but can be evaluated as outpt. No calcium or vitamin D supplements for now. Keep systolic blood pressure above 110 at all times. - ok to d/c with weekly labs and f/u with us in 2--3 weeks. updated RN . [facility] thanks you for the opportunity to serve this patient. Please call with any questions of concerns. Ntaty Phelan MD  [facility]  [address] [facility], [address]  Fax: [phone]  Office: [phone]    Assessment:   Generalized anasarca/massive volume overload. Tells me her weight is 220 pounds dry. Currently is 335 pounds. Started on Lasix drip I will add metolazone. Watch for hypokalemia.   She may get alkalotic may need to use some doses of albumin. I will also start the patient on Aldactone for the time being for potassium conservation. Last admission she was down to 324 pounds at discharge.        Acute Kidney Injury on CKD III:      DIONNE likely due to - cardiorenal syndrome, r/o urinary tract infection, also has cirrhosis of the liver with decompensation  Cr on consultation 1.3   Baseline Cr-1.5 with wide fluctuations. from 4/9  She is followed by Dr. Vick Villeda follow-up was 5/19     UA trace blood ,  otherwise bland  Renal Imaging:- 8/20- no hydronephrosis, no sig abnormality  Echo: 11/18-EF normal moderate TR     Hypertension   BP goal inpatient 676-150 systolic inpatient  Known to have atrial fibrillation     Cirrhosis of the liver-with portal  Diabetes mellitus on insulin  Hyperuricemia      DM   Morbid obesity. History of A. fib. Patient history of medication noncompliance. Patient is a full code.     ROS:     Positives Listed Bold. All other remaining systems are negative. Constitutional:  fever, chills, weakness, weight change, fatigue,      Skin:  rash, pruritus, hair loss, bruising, dry skin, petechiae. Head, Face, Neck   headaches, swelling,  cervical adenopathy. Respiratory: shortness of breath, cough, or wheezing  Cardiovascular: chest pain, palpitations, dizzy, edema  Gastrointestinal: nausea, vomiting, diarrhea, constipation,belly pain    Yellow skin, blood in stool  Musculoskeletal:  back pain, muscle weakness, gait problems,       joint pain or swelling. Genitourinary:  dysuria, poor urine flow, flank pain, blood in urine  Neurologic:  vertigo, TIA'S, syncope, seizures, focal weakness  Psychosocial:  insomnia, anxiety, or depression. Additional positive findings: -     PMH:   Past medical history, surgical history, social history, family history are reviewed and updated as appropriate. Reviewed current medication list.   Allergies reviewed and updated as needed.      PE:   Vitals: 12/24/20 1325   BP: (!) 109/35   Pulse: (!) 35   Resp: 10   Temp: 98.1 °F (36.7 °C)   SpO2: 91%       General appearance: femlae in NAD, fully alert and oriented. Comfortable. HEENT: EOM intact, no icterus. Trachea is midline. Neck : No masses, appears symmetrical, no JVD, trachea is midline  Respiratory: Respiratory effort appears normal, bilateral equal chest rise  Cardiovascular: Ausculation shows RR, ++ edema  Abdomen: No visible mass or tenderness, non distended. Musculoskeletal:  Joints with no swelling or deformity. Strength grossly normal  Skin:no rashes, ulcers, induration, no jaundice. Neuro: face symmetric, no focal deficits. Appropriate responses.  CN 2-12 grossly intact      Lab Results   Component Value Date    CREATININE 2.2 (H) 12/24/2020    BUN 53 (H) 12/24/2020     (L) 12/24/2020    K 3.8 12/24/2020    CL 94 (L) 12/24/2020    CO2 28 12/24/2020      Lab Results   Component Value Date    WBC 4.5 12/23/2020    HGB 7.7 (L) 12/23/2020    HCT 24.2 (L) 12/23/2020    MCV 82.8 12/23/2020     12/23/2020     Lab Results   Component Value Date    .8 (H) 12/23/2020    CALCIUM 10.7 (H) 12/24/2020    CAION 1.23 12/23/2020    PHOS 2.9 12/24/2020

## 2020-12-24 NOTE — PROGRESS NOTES
Post void bladder scan obtained per order and Dr Chiquis Ta notified via [facility], Per Dr Chiquis Ta, pt ok to DC from nephro standpoint.

## 2020-12-24 NOTE — CARE COORDINATION
CASE MANAGEMENT DISCHARGE SUMMARY    Discharge to:  ECF    Transportation:  [facility]  Ambulance form completed       Confirmed discharge plan with:Patient        Facility/Agency, name:  [address] [place] [address] [address]       [facility]   Phone number for report to facility: [phone]     RN, name: Gus Kuhn RN     Note: Discharging nurse to complete DANA, reconcile AVS, and place final copy with patient's discharge packet. RN to ensure that written prescriptions for  Level II medications are sent with patient to the facility as per protocol.   IMAN Mckeon

## 2020-12-24 NOTE — DISCHARGE SUMMARY
Hospital Medicine Discharge Summary    Patient ID: Shanique Brennan      Patient's PCP: Leon Christiansen MD    Admit Date: 12/4/2020     Discharge Date: 12/24/2020      Admitting Physician: Adelita Beltran MD     Discharge Physician: Daniel Lion MD     Discharge Diagnoses: Active Hospital Problems    Diagnosis    Anasarca [R60.1]    [facility] [K74.60, R18.8]    Acute on chronic diastolic CHF (congestive heart failure) ([facility]) [I50.33]    Morbid obesity ([facility] [E66.01]    Fluid overload [E87.70]    CHF (congestive heart failure) (HCC) [I50.9]    CAD (coronary artery disease) [I25.10]    Acute kidney injury superimposed on chronic kidney disease ([facility] [N17.9, N18.9]    Essential hypertension [I10]    Type 2 diabetes mellitus, with long-term current use of insulin ([facility]) [E11.9, Z79.4]    Stage 3 chronic kidney disease [N18.30]       The patient was seen and examined on day of discharge and this discharge summary is in conjunction with any daily progress note from day of discharge. Hospital Course:   History Of Present Illness:     71 y.o. female, with past medical history of CHF, morbid obesity, diabetes, hypertension, CAD, hyperlipidemia and atrial fibrillation, who presented to [facility] with bilateral leg edema and weight gain. History obtained from the patient and review of EMR. The patient is a resident of [address] [place] [address]. She stated over the last 4 months she has gained roughly 74 pounds. She stated most of her weight is in her abdomen and bilateral legs. Patient stated she has a history of Bessy Scriver as well as CKD stage III. She stated she sees  from nephrology at [facility] in [facility]. Per EMR, in 2018 the patient had 8.5 L removed through paracentesis while admitted at [facility]. In the emergency room patient had a chest x-ray that revealed vascular congestion.   She also had a CT abdomen pelvis that revealed findings of diffuse volume overload including abdominal and pelvic ascites, anasarca and findings consistent of congestive heart failure in the lower lungs. The patient was given 40 mg of IV Lasix in the emergency room. She will be admitted for further evaluation. Nephrology has been consulted. The patient denied any other associated symptoms as well as any aggravating and/or alleviating factors. At the time of this assessment, the patient was resting comfortably in bed. She currently denies any chest pain, back pain, abdominal pain, shortness of breath, numbness, tingling, N/V/C/D, fever and/or chills.        Acute on chronic diastolic CHF  Edema also complicated by cirrhosis. Continued furosemide drip and spironolactone.  Metolazone also added by nephrology.  Combination diuretics appeared to be effective. Remains off Norvasc, most likely permanently. Cardiology consulted, opinion appreciated.  There is a consideration for ischemic evaluation -defer to cards.  Cardiology rec outpt follow up  Patient admits to past noncompliance.  Did not want to go to bathroom often and either skips or refuses diuretics frequently. Overall, stable with continued diuresis and negative fluid balance.     Bradycardia  -Not on negative chronotropic medications  -Continued to monitor on telemetry. Current heart rate is low 50s     Acute kidney injury  Creatinine monitored, elevated as expected with aggressive diuresis.  Not critical  Nephrology input appreciated  Monitoring daily basic metabolic panel     Paroxysmal atrial fibrillation  Heart rate is controlled and stable  On Eliquis for stroke prophylaxis  Cardiology planning event monitor to assess arrhythmia burden.     Diabetes mellitus type 2  Current blood sugar readings are acceptable.  Continue with same dose of insulin.     Chronic normocytic anemia  Iron studies were consistent with iron deficiency.   -given total of 1 g IV Venofer infusion.  Orders written  No signs HGB 7.7 12/23/2020    HCT 24.2 12/23/2020     12/23/2020       Renal:    Lab Results   Component Value Date     12/24/2020    K 3.8 12/24/2020    K 4.4 12/04/2020    CL 94 12/24/2020    CO2 28 12/24/2020    BUN 53 12/24/2020    CREATININE 2.2 12/24/2020    CALCIUM 10.7 12/24/2020    PHOS 2.9 12/24/2020         Significant Diagnostic Studies    Radiology:   CT ABDOMEN PELVIS WO CONTRAST Additional Contrast? None   Final Result   1. Findings of diffuse volume overload including abdominal and pelvic   ascites, anasarca and findings of congestive heart failure in the lower lungs. 2. Hepatic morphologic features of cirrhosis. No gross hepatic lesion   evident. 3. Splenomegaly, typically associated with portal venous hypertension but   nonspecific. 4. Mild right upper quadrant lymph node enlargement is very likely reactive. XR CHEST PORTABLE   Final Result   Vascular congestion. No acute pulmonary disease. Stable enlargement of cardiac silhouette. Consults:     IP CONSULT TO HOSPITALIST  IP CONSULT TO SPIRITUAL SERVICES  IP CONSULT TO DIETITIAN  IP CONSULT TO DIETITIAN  IP CONSULT TO CARDIOLOGY  IP CONSULT TO NEPHROLOGY    Disposition:  home     Condition at Discharge: Stable    Discharge Instructions/Follow-up:  Cards as outpt, nephro as outpt    Code Status:  FULL    Activity: activity as tolerated    Diet: low sodium, 1500ml fluid restrict      Discharge Medications:     Discharge Medication List as of 12/24/2020  3:30 PM           Details   aspirin 81 MG EC tablet Take 1 tablet by mouth daily, Disp-30 tablet, R-0NO PRINT      spironolactone (ALDACTONE) 50 MG tablet Take 1 tablet by mouth daily, Disp-30 tablet, R-0NO PRINT      torsemide (DEMADEX) 20 MG tablet Take 2 tablets by mouth 2 times daily, Disp-30 tablet, R-1NO PRINT      miconazole (MICOTIN) 2 % powder Apply to skin folds. Substituted for Nystatin (MICOSTATIN) powder.  Apply topically 2 times daily until resolved., Disp-45 g, R-0, NO PRINT      carboxymethylcellulose PF (THERATEARS) 1 % GEL ophthalmic gel Place 1 drop into both eyes as needed for Dry Eyes, Disp-1 Bottle, R-0NO PRINT              Details   pantoprazole (PROTONIX) 40 MG tablet Take 1 tablet by mouth daily, Disp-30 tablet, R-0NO PRINT      tiZANidine (ZANAFLEX) 4 MG tablet Take 1 tablet by mouth 2 times daily, Disp-10 tablet, R-0NO PRINT              Details   apixaban (ELIQUIS) 5 MG TABS tablet Take by mouth 2 times dailyHistorical Med      atorvastatin (LIPITOR) 20 MG tablet Take 20 mg by mouth dailyHistorical Med      diphenhydrAMINE (BENADRYL) 25 MG capsule Take 25 mg by mouth every 6 hours as needed for ItchingHistorical Med      vitamin D (CHOLECALCIFEROL) 125 MCG (5000 UT) CAPS capsule Take 50,000 Units by mouth dailyHistorical Med      Sodium Phosphates (FLEET) 7-19 GM/118ML Place 1 enema rectally once as neededHistorical Med      fluticasone (FLONASE) 50 MCG/ACT nasal spray 1 spray by Each Nostril route dailyHistorical Med      glycerin, pediatric, 1 g SUPP Place 1 suppository rectally onceHistorical Med      polyethylene glycol (MIRALAX) 17 g packet Take 17 g by mouth daily as needed for ConstipationHistorical Med      insulin glargine (LANTUS) 100 UNIT/ML injection vial Inject into the skin nightly 24 units in am 15 units at bedtimeHistorical Med      levothyroxine (SYNTHROID) 125 MCG tablet Take 125 mcg by mouth DailyHistorical Med      meclizine (ANTIVERT) 25 MG tablet Take 25 mg by mouth 2 times daily Historical Med      magnesium hydroxide (MILK OF MAGNESIA CONCENTRATE) 2400 MG/10ML SUSP Take 2,400 mg by mouth once as needed, Oral, ONCE PRN, Historical Med      insulin aspart (NOVOLOG) 100 UNIT/ML injection cartridge Inject into the skin 3 times daily (before meals) Sliding scaleHistorical Med      ondansetron (ZOFRAN) 4 MG tablet Take 4 mg by mouth every 8 hours as needed for Nausea or VomitingHistorical Med      senna-docusate (PERICOLACE) 8.6-50 MG per tablet Take 1 tablet by mouth dailyHistorical Med      Dulaglutide (TRULICITY) 1.5 CV/0.1AW SOPN Inject 1.5 mg into the skin once a weekHistorical Med      sertraline (ZOLOFT) 100 MG tablet Take 125 mg by mouth nightlyHistorical Med             Time Spent on discharge is more than 30 minutes in the examination, evaluation, counseling and review of medications and discharge plan. Signed:    Jaclyn Mosher MD   12/25/2020      Thank you Christen MD Rekha for the opportunity to be involved in this patient's care. If you have any questions or concerns please feel free to contact me at [phone].

## 2020-12-26 ENCOUNTER — CARE COORDINATION (OUTPATIENT)
Dept: CASE MANAGEMENT | Age: 70
End: 2020-12-26

## 2020-12-26 NOTE — CARE COORDINATION
Tim 45 Transitions Initial Follow Up Call- COVID risk follow up call        Call within 2 business days of discharge: Yes    Patient: Lenka Pereyra Patient : 1950   MRN: <[id number]>  Reason for Admission: Jhoan, anasarca   Discharge Date: 20 RARS: Readmission Risk Score: 37      Last Discharge [address]       Complaint Diagnosis Description Type Department Provider    20 Leg Swelling Acute kidney injury superimposed on chronic kidney disease ([facility] [address] . .. ED to Hosp-Admission (Discharged) (ADMITTED) Jerica Davies MD; [place] TATUM Sep... Spoke with: facility nurse [address] [facility]    Call to facility nurse who states pt is doing well. Denies signs of SOB, fever/ chills, cough   Confirms they have meds ordered at DC   Denies needs  Writer informs this is final (CTC) phone call- v/u. Encouraged call writer/ CTC or providers if questions or concerns- v/u. Episode closed  (pt at LTC/ ECF)      Non-face-to-face services provided:  Obtained and reviewed discharge summary and/or continuity of care documents  Assessment and support for treatment adherence and medication management-confirms new and changed meds    Care Transitions 24 Hour Call    Do you have any ongoing symptoms?: No  Do you have a copy of your discharge instructions?: Yes  Do you have all of your prescriptions and are they filled?: Yes  Have you been contacted by a [facility] Pharmacist?: No  Have you scheduled your follow up appointment?: No  Were you discharged with any Home Care or Post Acute Services: Yes  Post Acute Services:  Other (Comment: lives in LTC/ ECF )  Do you feel like you have everything you need to keep you well at home?: Yes  Care Transitions Interventions         Follow Up  Future Appointments   Date Time Provider Linda Mccoy   2021 10:30 AM DANIELLE Braun - COREY Perez [facility] FRANCISCA Delvalle RN

## 2021-01-22 ENCOUNTER — TELEPHONE (OUTPATIENT)
Dept: CARDIOLOGY CLINIC | Age: 71
End: 2021-01-22

## 2021-01-22 ENCOUNTER — OFFICE VISIT (OUTPATIENT)
Dept: CARDIOLOGY CLINIC | Age: 71
End: 2021-01-22
Payer: COMMERCIAL

## 2021-01-22 VITALS
WEIGHT: 249.8 LBS | SYSTOLIC BLOOD PRESSURE: 110 MMHG | OXYGEN SATURATION: 98 % | DIASTOLIC BLOOD PRESSURE: 60 MMHG | HEART RATE: 65 BPM | BODY MASS INDEX: 41.62 KG/M2 | HEIGHT: 65 IN

## 2021-01-22 DIAGNOSIS — I50.32 CHRONIC DIASTOLIC (CONGESTIVE) HEART FAILURE (HCC): ICD-10-CM

## 2021-01-22 DIAGNOSIS — I48.0 PAF (PAROXYSMAL ATRIAL FIBRILLATION) (HCC): ICD-10-CM

## 2021-01-22 DIAGNOSIS — E78.5 DYSLIPIDEMIA: ICD-10-CM

## 2021-01-22 DIAGNOSIS — I10 ESSENTIAL HYPERTENSION: ICD-10-CM

## 2021-01-22 DIAGNOSIS — I25.10 CORONARY ARTERY DISEASE INVOLVING NATIVE CORONARY ARTERY OF NATIVE HEART WITHOUT ANGINA PECTORIS: Primary | ICD-10-CM

## 2021-01-22 PROCEDURE — G8484 FLU IMMUNIZE NO ADMIN: HCPCS | Performed by: NURSE PRACTITIONER

## 2021-01-22 PROCEDURE — 1111F DSCHRG MED/CURRENT MED MERGE: CPT | Performed by: NURSE PRACTITIONER

## 2021-01-22 PROCEDURE — G8400 PT W/DXA NO RESULTS DOC: HCPCS | Performed by: NURSE PRACTITIONER

## 2021-01-22 PROCEDURE — 1123F ACP DISCUSS/DSCN MKR DOCD: CPT | Performed by: NURSE PRACTITIONER

## 2021-01-22 PROCEDURE — 4040F PNEUMOC VAC/ADMIN/RCVD: CPT | Performed by: NURSE PRACTITIONER

## 2021-01-22 PROCEDURE — G8417 CALC BMI ABV UP PARAM F/U: HCPCS | Performed by: NURSE PRACTITIONER

## 2021-01-22 PROCEDURE — 1090F PRES/ABSN URINE INCON ASSESS: CPT | Performed by: NURSE PRACTITIONER

## 2021-01-22 PROCEDURE — 1036F TOBACCO NON-USER: CPT | Performed by: NURSE PRACTITIONER

## 2021-01-22 PROCEDURE — G8427 DOCREV CUR MEDS BY ELIG CLIN: HCPCS | Performed by: NURSE PRACTITIONER

## 2021-01-22 PROCEDURE — 99214 OFFICE O/P EST MOD 30 MIN: CPT | Performed by: NURSE PRACTITIONER

## 2021-01-22 PROCEDURE — 3017F COLORECTAL CA SCREEN DOC REV: CPT | Performed by: NURSE PRACTITIONER

## 2021-01-22 RX ORDER — ASPIRIN 81 MG/1
81 TABLET ORAL DAILY
Qty: 30 TABLET | Refills: 0
Start: 2021-01-22 | End: 2022-04-20

## 2021-01-22 NOTE — TELEPHONE ENCOUNTER
1-22-21 Pt informed me at checkout that she wanted me to contact [facility] to schedule her Lexiscan stress test and her followup with [facility] for April 2021. I called [facility] at [phone], they stated they think the patient schedules her own appointments and then my call was transferred, not sure to where, and no one picked up call. Will try to reach out again to [facility] at another time.

## 2021-01-22 NOTE — PROGRESS NOTES
[facility]   Cardiology Note              Date:  January 22, 2021  Patientname: Franck Hill  YOB: 1950    Primary Care physician: Dang Duran MD    HISTORY OF PRESENT ILLNESS: Franck Hill is a 72 y.o. female with a history of CAD, CHF, PAF, HTN, HLD, CVA. In 1995 she had CABG. Previously followed by [facility] but lost to follow up. She was admitted 12/4/2020 from [facility] for shortness of breath. Echo showed EF 55%, severe pulmonary HTN. Troponin elevated. Treated for CHF. Plan for stress test in follow up. Today she presents for hospital follow up for CHF. She is in a wheelchair and spends a majority of time in bed. She has some shortness of breath with exertion. She has no chest pain, palpitations, dizziness. She lives at [place] [address] and nurses report she is not compliant with diet/torsemide. Past Medical History:   has a past medical history of Anxiety, Arthritis, Atrial fibrillation ([address] CAD (coronary artery disease), CHF (congestive heart failure) ([address] Chronic pain syndrome, Cirrhosis ([address] Depression, Diabetes mellitus ([address] GERD (gastroesophageal reflux disease), Hyperlipidemia, Hypertension, Kidney disease, Thyroid disease, and Vitamin D deficiency. Past Surgical History:   has a past surgical history that includes Coronary Artery Bypass Graft Maze Procedure (1995); Cholecystectomy; and Breast reduction surgery. Home Medications:    Prior to Admission medications    Medication Sig Start Date End Date Taking?  Authorizing Provider   spironolactone (ALDACTONE) 50 MG tablet Take 1 tablet by mouth daily 12/25/20  Yes Mary Mtz MD   torsemide [facility]) 20 MG tablet Take 2 tablets by mouth 2 times daily 12/24/20  Yes Mary Mtz MD   pantoprazole (PROTONIX) 40 MG tablet Take 1 tablet by mouth daily 12/25/20  Yes Mary Mtz MD miconazole (MICOTIN) 2 % powder Apply to skin folds. Substituted for Nystatin (MICOSTATIN) powder. Apply topically 2 times daily until resolved.  12/24/20  Yes Stu Guerrero MD   tiZANidine (ZANAFLEX) 4 MG tablet Take 1 tablet by mouth 2 times daily 12/24/20  Yes Stu Guerrero MD   carboxymethylcellulose PF (THERATEARS) 1 % GEL ophthalmic gel Place 1 drop into both eyes as needed for Dry Eyes 12/24/20  Yes Stu Guerrero MD   apixaban (ELIQUIS) 5 MG TABS tablet Take by mouth 2 times daily   Yes Historical Provider, MD   atorvastatin (LIPITOR) 20 MG tablet Take 20 mg by mouth daily   Yes Historical Provider, MD   diphenhydrAMINE (BENADRYL) 25 MG capsule Take 25 mg by mouth every 6 hours as needed for Itching   Yes Historical Provider, MD   vitamin D (CHOLECALCIFEROL) 125 MCG (5000 UT) CAPS capsule Take 50,000 Units by mouth daily   Yes Historical Provider, MD   Sodium Phosphates (FLEET) 7-19 GM/118ML Place 1 enema rectally once as needed   Yes Historical Provider, MD   fluticasone (FLONASE) 50 MCG/ACT nasal spray 1 spray by Each Nostril route daily   Yes Historical Provider, MD   glycerin, pediatric, 1 g SUPP Place 1 suppository rectally once   Yes Historical Provider, MD   polyethylene glycol (MIRALAX) 17 g packet Take 17 g by mouth daily as needed for Constipation   Yes Historical Provider, MD   insulin glargine (LANTUS) 100 UNIT/ML injection vial Inject into the skin nightly 24 units in am 15 units at bedtime   Yes Historical Provider, MD   levothyroxine (SYNTHROID) 125 MCG tablet Take 125 mcg by mouth Daily   Yes Historical Provider, MD   meclizine (ANTIVERT) 25 MG tablet Take 25 mg by mouth 2 times daily    Yes Historical Provider, MD   insulin aspart (NOVOLOG) 100 UNIT/ML injection cartridge Inject into the skin 3 times daily (before meals) Sliding scale   Yes Historical Provider, MD ondansetron (ZOFRAN) 4 MG tablet Take 4 mg by mouth every 8 hours as needed for Nausea or Vomiting   Yes Historical Provider, MD   senna-docusate (Ximena Haste) 8.6-50 MG per tablet Take 1 tablet by mouth daily   Yes Historical Provider, MD   sertraline (ZOLOFT) 100 MG tablet Take 125 mg by mouth nightly   Yes Historical Provider, MD   aspirin 81 MG EC tablet Take 1 tablet by mouth daily  Patient not taking: Reported on 1/22/2021 12/25/20   Siva Garcia MD   magnesium hydroxide (MILK OF MAGNESIA CONCENTRATE) 2400 MG/10ML SUSP Take 2,400 mg by mouth once as needed    Historical Provider, MD   Dulaglutide (TRULICITY) 1.5 TJ/5.2XZ SOPN Inject 1.5 mg into the skin once a week    Historical Provider, MD     Allergies:  Ceclor [cefaclor] and Lisinopril    Social History:   reports that she has never smoked. She has never used smokeless tobacco. She reports that she does not drink alcohol or use drugs. Family History: family history is not on file. OBJECTIVE:    Vital signs:    /60   Pulse 65   Ht 5' 5\" (1.651 m)   Wt 259 lb (117.5 kg)   SpO2 98%   BMI 43.10 kg/m²      Physical Exam:  Constitutional:  Comfortable and alert, NAD, appears older than stated age, obese  Eyes: PERRL, sclera nonicteric  Neck:  Supple, no masses, no thyroidmegaly, JVP 6  Skin:  Warm and dry; no rash or lesions  Heart:  Regular, normal apex, S1 and S2 normal, no M/G/R  Lungs:  Normal respiratory effort; clear; no wheezing/rhonchi/rales  Abdomen: soft, non tender, + bowel sounds  Extremities: 1+ BLE edema  Neuro: alert and oriented, moves legs and arms equally, odd mood and affect    Data Reviewed:      Echo 12/19/2020:   Normal left ventricular systolic function with ejection fraction of 55%. Abnormal (paradoxical) septal motion is present. Compared to previous study   from 12-7-2020 no changes noted in left ventricular function. The right ventricle is moderately enlarged.  Right ventricular systolic DM: hgb a1c 6.6  CKD  Anemia  Obesity  History of CVA    Plan:   1. Start aspirin 81 mg daily for CAD  2. Stress test as previously recommended for elevated troponin in hospital and for risk stratifcation  3. Check BMP and BNP  4. Continue torsemide, spironolactone, statin, eliquis; not on beta blocker due to previous bradycardia   5. Cardiac monitor in follow up due to AF  6. Follow up with Dr. Kaylynn Longoria  7.  Follow up in 3 months    DANIELLE Branch-CNP  [facility]  [phone]

## 2021-01-22 NOTE — LETTER
Pt scheduled to have VV/Telephone visit with DEBBY BROWN. Attempted to contact pt by phone with number provided in chart at [phone], call could not be completed or has been disconnected according to automated message. I also attempted to call contacts that are listed in pt chart, Shaye Encinas at [phone], call to him could not be completed either. I attempted to call the second person listed under pt contacts, Donita Sabillon at [phone]. Call could not be complete according to automated message as well. [address]   Cardiology Note              Date:  January 22, 2021  Patientname: Caroline Mansfield  YOB: 1950    Primary Care physician: Franklin Weldon MD    HISTORY OF PRESENT ILLNESS: Caroline Mansfield is a 72 y.o. female with a history of CAD, CHF, PAF, HTN, HLD, CVA. In 1995 she had CABG. [facility] but lost to follow up. She was admitted 12/4/2020 from [facility] for shortness of breath. Echo showed EF 55%, severe pulmonary HTN. Troponin elevated. Treated for CHF. Plan for stress test in follow up. Today she presents for hospital follow up for CHF. She is in a wheelchair and spends a majority of time in bed. She has some shortness of breath with exertion. She has no chest pain, palpitations, dizziness. She lives at [place] [address] and nurses report she is not compliant with diet/torsemide. Past Medical History:   has a past medical history of Anxiety, Arthritis, Atrial fibrillation ([address] CAD (coronary artery disease), CHF (congestive heart failure) ([address] Chronic pain syndrome, Cirrhosis ([address] Depression, Diabetes mellitus ([address] GERD (gastroesophageal reflux disease), Hyperlipidemia, Hypertension, Kidney disease, Thyroid disease, and Vitamin D deficiency. Past Surgical History:   has a past surgical history that includes Coronary Artery Bypass Graft Maze Procedure (1995); Cholecystectomy; and Breast reduction surgery. Home Medications:    Prior to Admission medications    Medication Sig Start Date End Date Taking? Authorizing Provider   spironolactone (ALDACTONE) 50 MG tablet Take 1 tablet by mouth daily 12/25/20  Yes Lorin Galan MD   torsemide [facility]) 20 MG tablet Take 2 tablets by mouth 2 times daily 12/24/20  Yes Lorin Galan MD   pantoprazole (PROTONIX) 40 MG tablet Take 1 tablet by mouth daily 12/25/20  Yes Lorin Galan MD   miconazole (MICOTIN) 2 % powder Apply to skin folds. Substituted for Nystatin (MICOSTATIN) powder. Apply topically 2 times daily until resolved.  12/24/20  Yes Lorin Galan MD   tiZANidine (ZANAFLEX) 4 MG tablet Take 1 tablet by mouth 2 times daily 12/24/20  Yes Lorin Galan MD   carboxymethylcellulose PF (THERATEARS) 1 % GEL ophthalmic gel Place 1 drop into both eyes as needed for Dry Eyes 12/24/20  Yes Lorin Galan MD   apixaban (ELIQUIS) 5 MG TABS tablet Take by mouth 2 times daily   Yes Historical Provider, MD   atorvastatin (LIPITOR) 20 MG tablet Take 20 mg by mouth daily   Yes Historical Provider, MD   diphenhydrAMINE (BENADRYL) 25 MG capsule Take 25 mg by mouth every 6 hours as needed for Itching   Yes Historical Provider, MD   vitamin D (CHOLECALCIFEROL) 125 MCG (5000 UT) CAPS capsule Take 50,000 Units by mouth daily   Yes Historical Provider, MD   Sodium Phosphates (FLEET) 7-19 GM/118ML Place 1 enema rectally once as needed   Yes Historical Provider, MD   fluticasone (FLONASE) 50 MCG/ACT nasal spray 1 spray by Each Nostril route daily   Yes Historical Provider, MD   glycerin pediatric, 1 g SUPP Place 1 suppository rectally once   Yes Historical Provider, MD   polyethylene glycol (MIRALAX) 17 g packet Take 17 g by mouth daily as needed for Constipation   Yes Historical Provider, MD   insulin glargine (LANTUS) 100 UNIT/ML injection vial Inject into the skin nightly 24 units in am 15 units at bedtime   Yes Historical Provider, MD levothyroxine (SYNTHROID) 125 MCG tablet Take 125 mcg by mouth Daily   Yes Historical Provider, MD   meclizine (ANTIVERT) 25 MG tablet Take 25 mg by mouth 2 times daily    Yes Historical Provider, MD   insulin aspart (NOVOLOG) 100 UNIT/ML injection cartridge Inject into the skin 3 times daily (before meals) Sliding scale   Yes Historical Provider, MD   ondansetron (ZOFRAN) 4 MG tablet Take 4 mg by mouth every 8 hours as needed for Nausea or Vomiting   Yes Historical Provider, MD   senna-docusate (Murlene Ricks) 8.6-50 MG per tablet Take 1 tablet by mouth daily   Yes Historical Provider, MD   sertraline (ZOLOFT) 100 MG tablet Take 125 mg by mouth nightly   Yes Historical Provider, MD   aspirin 81 MG EC tablet Take 1 tablet by mouth daily  Patient not taking: Reported on 1/22/2021 12/25/20   Anna Leavitt MD   magnesium hydroxide (MILK OF MAGNESIA CONCENTRATE) 2400 MG/10ML SUSP Take 2,400 mg by mouth once as needed    Historical Provider, MD   Dulaglutide (TRULICITY) 1.5 ZP/6.1BS SOPN Inject 1.5 mg into the skin once a week    Historical Provider, MD     Allergies:  Ceclor [cefaclor] and Lisinopril    Social History:   reports that she has never smoked. She has never used smokeless tobacco. She reports that she does not drink alcohol or use drugs. Family History: family history is not on file.     OBJECTIVE:    Vital signs:    /60   Pulse 65   Ht 5' 5\" (1.651 m)   Wt 259 lb (117.5 kg)   SpO2 98%   BMI 43.10 kg/m²      Physical Exam:  Constitutional:  Comfortable and alert, NAD, appears older than stated age, obese  Eyes: PERRL, sclera nonicteric  Neck:  Supple, no masses, no thyroidmegaly, JVP 6  Skin:  Warm and dry; no rash or lesions  Heart:  Regular, normal apex, S1 and S2 normal, no M/G/R  Lungs:  Normal respiratory effort; clear; no wheezing/rhonchi/rales  Abdomen: soft, non tender, + bowel sounds  Extremities: 1+ BLE edema Assessment:   Chronic diastolic CHF, right heart failure: likely still fluid overloaded              - dry weight felt to be about 220 lbs (249 lbs today)  Paroxysmal atrial fibrillation: stable              - [id number] score > 2 on eliquis  Bradycardia: noted in hospital 12/2020  CAD: s/p CABG x3 1995  Pulmonary HTN: severe on echo 12/7/2020  TR: moderate  HTN: stable  HLD: controlled, LDL 36, statin  DM: hgb a1c 6.6  CKD  Anemia  Obesity  History of CVA    Plan:   1. Start aspirin 81 mg daily for CAD  2. Stress test as previously recommended for elevated troponin in hospital and for risk stratifcation  3. Check BMP and BNP  4. Continue torsemide, spironolactone, statin, eliquis; not on beta blocker due to previous bradycardia   5. Cardiac monitor in follow up due to AF  6. Follow up with Dr. Sofia Conway  7.  Follow up in 3 months    DANIELLE Tirado-CNP  [facility]  [phone]

## 2021-01-22 NOTE — PROGRESS NOTES
Pt scheduled to have VV/Telephone visit with DEBBY BROWN. Attempted to contact pt by phone with number provided in chart at [phone], call could not be completed or has been disconnected according to automated message. I also attempted to call contacts that are listed in pt chart, Erick Harlan at [phone], call to him could not be completed either. I attempted to call the second person listed under pt contacts, General Ren at [phone]. Call could not be complete according to automated message as well.

## 2021-01-27 NOTE — TELEPHONE ENCOUNTER
Nursing home contacted [address] and pt is scheduled for stress test on 2-3-21 at [facility]

## 2021-08-03 NOTE — PROGRESS NOTES
Caller: Julissa Manriquez    Relationship: Emergency Contact    Best call back number: [phone]    Medication needed:   Requested Prescriptions     Pending Prescriptions Disp Refills   • HYDROcodone-acetaminophen (NORCO) 5-325 MG per tablet 60 tablet 0     Sig: Take 1 tablet by mouth Every 8 (Eight) Hours As Needed for Moderate Pain . For pain       When do you need the refill by: 08/03/2021    What additional details did the patient provide when requesting the medication: PATIENT TOOK LAST PILL LAST NIGHT     Does the patient have less than a 3 day supply:  [x] Yes  [] No    What is the patient's preferred pharmacy: [place] [address] [address] [facility] [address] [facility] [phone] [facility] [phone] [facility]            Hospitalist Progress Note      PCP: Bynum Harada, MD    Date of Admission: 12/4/2020    Chief Complaint: Bilateral leg edema and weight gain      Subjective:  2.6 L UOP. Refusing standing weights. She says she feels better, breathing more easily, slept well for the first time in awhile. Awaiting labs. Medications:  Reviewed    Infusion Medications    dextrose      furosemide (LASIX) 1mg/ml infusion 10 mg/hr (12/06/20 0413)     Scheduled Medications    amLODIPine  10 mg Oral Daily    apixaban  5 mg Oral BID    atorvastatin  20 mg Oral Daily    insulin glargine  15 Units Subcutaneous Nightly    levothyroxine  125 mcg Oral Daily    pantoprazole  40 mg Oral Daily    sertraline  125 mg Oral Nightly    sodium chloride flush  10 mL Intravenous 2 times per day    insulin lispro  0-12 Units Subcutaneous TID WC    insulin lispro  0-6 Units Subcutaneous Nightly    tiZANidine  4 mg Oral BID     PRN Meds: sodium chloride flush, acetaminophen **OR** acetaminophen, polyethylene glycol, promethazine **OR** ondansetron, glucose, dextrose, glucagon (rDNA), dextrose, perflutren lipid microspheres, traMADol, carboxymethylcellulose PF, sodium chloride flush      Intake/Output Summary (Last 24 hours) at 12/6/2020 0904  Last data filed at 12/6/2020 1253  Gross per 24 hour   Intake 1159 ml   Output 2575 ml   Net -1416 ml       Physical Exam Performed:    BP (!) 133/55   Pulse 81   Temp 98.3 °F (36.8 °C) (Oral)   Resp 18   Ht 5' 5\" (1.651 m)   Wt (!) 360 lb 10.8 oz (163.6 kg)   SpO2 92%   BMI 60.02 kg/m²     General appearance: No apparent distress, appears stated age and cooperative. HEENT: Pupils equal, round, and reactive to light. Conjunctivae/corneas clear. Neck: Supple, with full range of motion. No jugular venous distention. Trachea midline. Respiratory:  Normal respiratory effort. Clear to auscultation, bilaterally without Wheezes/Rhonchi. Faint rales.    Cardiovascular: Regular rate and rhythm with normal S1/S2 without murmurs, rubs or gallops. Abdomen: Soft, non-tender, non-distended with normal bowel sounds. Musculoskeletal: No clubbing, cyanosis. Pitting anasarca. Full range of motion without deformity. Skin: Skin color, texture, turgor normal.  No rashes or lesions. Neurologic:  Neurovascularly intact without any focal sensory/motor deficits. Cranial nerves: II-XII intact, grossly non-focal.  Psychiatric: Alert and oriented, thought content appropriate, normal insight  Capillary Refill: Brisk,< 3 seconds   Peripheral Pulses: +2 palpable, equal bilaterally       Labs:   Recent Labs     12/04/20 2018   WBC 3.8*   HGB 6.9*   HCT 22.8*        Recent Labs     12/04/20 2018 12/05/20  0340    135*   K 4.4 3.8    104   CO2 21 20*   BUN 50* 48*   CREATININE 2.0* 1.8*   CALCIUM 9.6 9.8     Recent Labs     12/04/20 2018   AST 24   ALT 8*   BILITOT 0.4   ALKPHOS 108     Recent Labs     12/04/20 2018   INR 2.03*     Recent Labs     12/04/20 2018 12/05/20  0340   TROPONINI 0.05* 0.04*       Urinalysis:      Lab Results   Component Value Date    NITRU Negative 12/05/2020    WBCUA 3-5 12/05/2020    BACTERIA 2+ 08/20/2020    RBCUA 5-10 12/05/2020    BLOODU SMALL 12/05/2020    SPECGRAV 1.025 12/05/2020    GLUCOSEU Negative 12/05/2020       Radiology:  CT ABDOMEN PELVIS WO CONTRAST Additional Contrast? None   Final Result   1. Findings of diffuse volume overload including abdominal and pelvic   ascites, anasarca and findings of congestive heart failure in the lower lungs. 2. Hepatic morphologic features of cirrhosis. No gross hepatic lesion   evident. 3. Splenomegaly, typically associated with portal venous hypertension but   nonspecific. 4. Mild right upper quadrant lymph node enlargement is very likely reactive. XR CHEST PORTABLE   Final Result   Vascular congestion. No acute pulmonary disease. Stable enlargement of cardiac silhouette. Assessment/Plan:    Active Hospital Problems    Diagnosis    Morbid obesity ([facility] [E66.01]    Fluid overload [E87.70]    CHF (congestive heart failure) (HCC) [I50.9]    CAD (coronary artery disease) [I25.10]    Acute kidney injury superimposed on chronic kidney disease (HCC) [N17.9, N18.9]    Essential hypertension [I10]    Type 2 diabetes mellitus, with long-term current use of insulin (HCC) [E11.9, Z79.4]    Stage 3 chronic kidney disease [N18.30]       \"70 y.o. female, with past medical history of CHF, morbid obesity, diabetes, hypertension, CAD, hyperlipidemia and atrial fibrillation, who presented to [facility] with bilateral leg edema and weight gain. The patient is a resident of [address] [place] [address]. She stated over the last 4 months she has gained roughly 74 pounds. She stated most of her weight is in her abdomen and bilateral legs. Patient stated she has a history of Sue Law as well as CKD stage III. She stated she sees  from nephrology at [facility] in [facility]. Per EMR, in 2018 the patient had 8.5 L removed through paracentesis while admitted at [facility]. In the emergency room patient had a chest x-ray that revealed vascular congestion. She also had a CT abdomen pelvis that revealed findings of diffuse volume overload including abdominal and pelvic ascites, anasarca and findings consistent of congestive heart failure in the lower lungs. \"       Acute on chronic diastolic CHF  - furosemide gtt (increased rate), added spironolactone. She will need to be more compliant with her outpatient diuretic regimen - she admits to refusing meds because she couldn't make it to the bathroom in time. Staff there will need to focus on helping her to the bathroom quickly when she asks. - at the time of last discharge on 8/23/20, she weighed 242 lbs on a standing scale.   I reviewed the last couple years of her admissions and her claims that she usually diureses from 5 lbs down to 190 do not seem to be accurate.    - F/u TTE.  - no ARB/ARNI due to DIONNE, consider as outpatient  - she is on amlodipine. - add metoprolol when she is closer to euvolemic    DIONNE on CKD3  - due to cardiorenal syndrome. Baseline Cr perhaps about 1.3. Monitor. pAfib  - apixaban    DM2  - insulin regimen    Chronic normocytic anemia  - 1u pRBCs on 12/5    HLD  - statin    Morbid obesity  With Body mass index is 24.1 kg/m². Complicating assessment and treatment. Placing patient at risk for multiple co-morbidities as well as early death and contributing to the patient's presentation. Counseled on weight loss      DVT Prophylaxis: anticoagulation as above  Diet: DIET LOW SODIUM 2 GM; 2000 ml  Code Status: Full Code    PT/OT Eval Status: not indicated    Dispo - when adequately diuresed. Perhaps 12/11. She lives at [facility].        Sarah Barraza MD

## 2022-01-02 ENCOUNTER — APPOINTMENT (OUTPATIENT)
Dept: GENERAL RADIOLOGY | Age: 72
End: 2022-01-02
Payer: COMMERCIAL

## 2022-01-02 ENCOUNTER — HOSPITAL ENCOUNTER (EMERGENCY)
Age: 72
Discharge: HOME | End: 2022-01-03
Attending: EMERGENCY MEDICINE
Payer: COMMERCIAL

## 2022-01-02 DIAGNOSIS — S50.11XA CONTUSION OF RIGHT FOREARM, INITIAL ENCOUNTER: ICD-10-CM

## 2022-01-02 DIAGNOSIS — S81.812A LACERATION OF LEFT LOWER EXTREMITY, INITIAL ENCOUNTER: Primary | ICD-10-CM

## 2022-01-02 DIAGNOSIS — Z23 NEED FOR DIPHTHERIA-TETANUS-PERTUSSIS (TDAP) VACCINE: ICD-10-CM

## 2022-01-02 DIAGNOSIS — S63.502A WRIST SPRAIN, LEFT, INITIAL ENCOUNTER: ICD-10-CM
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
## 2022-01-02 PROCEDURE — 73090 X-RAY EXAM OF FOREARM: CPT

## 2022-01-02 PROCEDURE — 2500000003 HC RX 250 WO HCPCS: Performed by: PHYSICIAN ASSISTANT

## 2022-01-02 PROCEDURE — 6370000000 HC RX 637 (ALT 250 FOR IP): Performed by: PHYSICIAN ASSISTANT

## 2022-01-02 PROCEDURE — 6360000002 HC RX W HCPCS: Performed by: PHYSICIAN ASSISTANT

## 2022-01-02 PROCEDURE — 12002 RPR S/N/AX/GEN/TRNK2.6-7.5CM: CPT

## 2022-01-02 PROCEDURE — 90715 TDAP VACCINE 7 YRS/> IM: CPT | Performed by: PHYSICIAN ASSISTANT

## 2022-01-02 PROCEDURE — 90471 IMMUNIZATION ADMIN: CPT | Performed by: PHYSICIAN ASSISTANT

## 2022-01-02 PROCEDURE — 73590 X-RAY EXAM OF LOWER LEG: CPT

## 2022-01-02 PROCEDURE — 99284 EMERGENCY DEPT VISIT MOD MDM: CPT

## 2022-01-02 PROCEDURE — 73110 X-RAY EXAM OF WRIST: CPT

## 2022-01-02 RX ORDER — LIDOCAINE HYDROCHLORIDE AND EPINEPHRINE 10; 10 MG/ML; UG/ML
20 INJECTION, SOLUTION INFILTRATION; PERINEURAL ONCE
Status: COMPLETED | OUTPATIENT
Start: 2022-01-02 | End: 2022-01-02

## 2022-01-02 RX ORDER — ACETAMINOPHEN 500 MG
1000 TABLET ORAL ONCE
Status: COMPLETED | OUTPATIENT
Start: 2022-01-02 | End: 2022-01-02

## 2022-01-02 RX ADMIN — ACETAMINOPHEN 1000 MG: 500 TABLET ORAL at 23:44

## 2022-01-02 RX ADMIN — LIDOCAINE HYDROCHLORIDE,EPINEPHRINE BITARTRATE 20 ML: 10; .01 INJECTION, SOLUTION INFILTRATION; PERINEURAL at 23:29

## 2022-01-02 RX ADMIN — TETANUS TOXOID, REDUCED DIPHTHERIA TOXOID AND ACELLULAR PERTUSSIS VACCINE, ADSORBED 0.5 ML: 5; 2.5; 8; 8; 2.5 SUSPENSION INTRAMUSCULAR at 23:25

## 2022-01-02 ASSESSMENT — PAIN DESCRIPTION - LOCATION: LOCATION: LEG

## 2022-01-02 ASSESSMENT — PAIN DESCRIPTION - PAIN TYPE: TYPE: ACUTE PAIN

## 2022-01-02 ASSESSMENT — PAIN SCALES - GENERAL
PAINLEVEL_OUTOF10: 8
PAINLEVEL_OUTOF10: 0

## 2022-01-02 ASSESSMENT — PAIN DESCRIPTION - ORIENTATION: ORIENTATION: LEFT

## 2022-01-03 VITALS
RESPIRATION RATE: 18 BRPM | HEART RATE: 73 BPM | DIASTOLIC BLOOD PRESSURE: 67 MMHG | OXYGEN SATURATION: 96 % | HEIGHT: 65 IN | BODY MASS INDEX: 43.32 KG/M2 | WEIGHT: 260 LBS | TEMPERATURE: 98.7 F | SYSTOLIC BLOOD PRESSURE: 142 MMHG

## 2022-01-03 ASSESSMENT — PAIN SCALES - GENERAL: PAINLEVEL_OUTOF10: 0

## 2022-01-03 NOTE — ED NOTES
Bed: 10  Expected date:   Expected time:   Means of arrival:   Comments:  KALIN Sahni RN  01/02/22 5040

## 2022-01-03 NOTE — ED NOTES
WC completed. Wrapped w/ nonadherent, gauze, gauze wrap and coban per verbal order from [place], [place].      Mj Olivares RN  01/03/22 7667

## 2022-01-03 NOTE — ED PROVIDER NOTES
[address] [facility]  ED  EMERGENCY DEPARTMENT ENCOUNTER        Pt Name: Elva Padgett  MRN: [id number]  [place] 1950  Date of evaluation: 1/2/2022  Provider: Belen Thrasher PA-C  PCP: Floyd Espinoza MD  Note Started: 11:40 PM EST       JILLIAN. I have evaluated this patient. My supervising physician was available for consultation. Cha Arshad MD      CHIEF COMPLAINT       Chief Complaint   Patient presents with    Laceration     from fall whle transferring from wheelchair, lac to L leg, on elliquis        HISTORY OF PRESENT ILLNESS   (Location, Timing/Onset, Context/Setting, Quality, Duration, Modifying Factors, Severity, Associated Signs and Symptoms)  Note limiting factors. Chief Complaint: Fall from wheelchair causing puncture laceration lateral aspect of the left lower leg. Elva Padgett is a 80 y.o. female who presents with the above complaint. Apparently being assisted from wheelchair and resulted in a slip from chair and puncture laceration lateral aspect of the left lower leg. No active bleeding. Minor seepage at this time. Also complaining of pain about the left wrist and right forearm. I will image these areas. Patient tetanus shot greater than 5 years. She consents to update. Patient did not strike head. No headache or neck pain complaint. No chest pain complaint. Nursing Notes were all reviewed and agreed with or any disagreements were addressed in the HPI. REVIEW OF SYSTEMS    (2-9 systems for level 4, 10 or more for level 5)     Review of Systems    Positives and Pertinent negatives as per HPI. Except as noted above in the ROS, all other systems were reviewed and negative.        PAST MEDICAL HISTORY     Past Medical History:   Diagnosis Date    Anxiety     Arthritis     Atrial fibrillation ([facility]     CAD (coronary artery disease)     CHF (congestive heart failure) (HCC)     Chronic pain syndrome     Cirrhosis ([facility]     Depression     Diabetes mellitus ([facility]     GERD (gastroesophageal reflux disease)     Hyperlipidemia     Hypertension     Kidney disease     Thyroid disease     Vitamin D deficiency          SURGICAL HISTORY     Past Surgical History:   Procedure Laterality Date    BREAST REDUCTION SURGERY      CHOLECYSTECTOMY      CORONARY ARTERY BYPASS GRAFT MAZE PROCEDURE  1995    triple bypass         CURRENTMEDICATIONS       Previous Medications    APIXABAN (ELIQUIS) 5 MG TABS TABLET    Take by mouth 2 times daily    ASPIRIN 81 MG EC TABLET    Take 1 tablet by mouth daily    ATORVASTATIN (LIPITOR) 20 MG TABLET    Take 20 mg by mouth daily    CARBOXYMETHYLCELLULOSE PF (THERATEARS) 1 % GEL OPHTHALMIC GEL    Place 1 drop into both eyes as needed for Dry Eyes    DIPHENHYDRAMINE (BENADRYL) 25 MG CAPSULE    Take 25 mg by mouth every 6 hours as needed for Itching    DULAGLUTIDE (TRULICITY) 1.5 SP/4.8LY SOPN    Inject 1.5 mg into the skin once a week    FLUTICASONE (FLONASE) 50 MCG/ACT NASAL SPRAY    1 spray by Each Nostril route daily    GLYCERIN, PEDIATRIC, 1 G SUPP    Place 1 suppository rectally once    INSULIN ASPART (NOVOLOG) 100 UNIT/ML INJECTION CARTRIDGE    Inject into the skin 3 times daily (before meals) Sliding scale    INSULIN GLARGINE (LANTUS) 100 UNIT/ML INJECTION VIAL    Inject into the skin nightly 24 units in am 15 units at bedtime    LEVOTHYROXINE (SYNTHROID) 125 MCG TABLET    Take 125 mcg by mouth Daily    MAGNESIUM HYDROXIDE (MILK OF MAGNESIA CONCENTRATE) 2400 MG/10ML SUSP    Take 2,400 mg by mouth once as needed    MECLIZINE (ANTIVERT) 25 MG TABLET    Take 25 mg by mouth 2 times daily     MICONAZOLE (MICOTIN) 2 % POWDER    Apply to skin folds. Substituted for Nystatin (MICOSTATIN) powder. Apply topically 2 times daily until resolved.     ONDANSETRON (ZOFRAN) 4 MG TABLET    Take 4 mg by mouth every 8 hours as needed for Nausea or Vomiting    PANTOPRAZOLE (PROTONIX) 40 MG TABLET    Take 1 tablet by mouth daily POLYETHYLENE GLYCOL (MIRALAX) 17 G PACKET    Take 17 g by mouth daily as needed for Constipation    SENNA-DOCUSATE (PERICOLACE) 8.6-50 MG PER TABLET    Take 1 tablet by mouth daily    SERTRALINE (ZOLOFT) 100 MG TABLET    Take 125 mg by mouth nightly    SODIUM PHOSPHATES (FLEET) 7-19 GM/118ML    Place 1 enema rectally once as needed    SPIRONOLACTONE (ALDACTONE) 50 MG TABLET    Take 1 tablet by mouth daily    TIZANIDINE (ZANAFLEX) 4 MG TABLET    Take 1 tablet by mouth 2 times daily    TORSEMIDE (DEMADEX) 20 MG TABLET    Take 2 tablets by mouth 2 times daily    VITAMIN D (CHOLECALCIFEROL) 125 MCG (5000 UT) CAPS CAPSULE    Take 50,000 Units by mouth daily         ALLERGIES     Ceclor [cefaclor] and Lisinopril    FAMILYHISTORY     History reviewed. No pertinent family history. SOCIAL HISTORY       Social History     Tobacco Use    Smoking status: Never Smoker    Smokeless tobacco: Never Used   Substance Use Topics    Alcohol use: Never    Drug use: Never       SCREENINGS    [place] Coma Scale  Eye Opening: Spontaneous  Best Verbal Response: Oriented  Best Motor Response: Obeys commands  [place] Coma Scale Score: 15        PHYSICAL EXAM    (up to 7 for level 4, 8 or more for level 5)     ED Triage Vitals [01/02/22 2133]   BP Temp Temp Source Pulse Resp SpO2 Height Weight   (!) 152/96 98.7 °F (37.1 °C) Oral 96 18 97 % -- 150 lb (68 kg)       Physical Exam  Vitals and nursing note reviewed. Constitutional:       Appearance: Normal appearance. She is well-developed. She is obese. HENT:      Head: Normocephalic and atraumatic. Right Ear: External ear normal.      Left Ear: External ear normal.   Eyes:      General: No scleral icterus. Right eye: No discharge. Left eye: No discharge. Conjunctiva/sclera: Conjunctivae normal.   Cardiovascular:      Rate and Rhythm: Normal rate and regular rhythm. Heart sounds: Normal heart sounds.    Pulmonary:      Effort: Pulmonary effort is normal. Breath sounds: Normal breath sounds. Musculoskeletal:         General: Tenderness and signs of injury present. Normal range of motion. Cervical back: Normal range of motion and neck supple. Right lower leg: Edema present. Left lower leg: Edema present. Comments: Patient with old bruising noted right forearm. Patient with some discomfort about the left wrist with range of motion. No deformity or concern swelling. Laceration lateral aspect of the mid and proximal aspect left lower leg measures 3 cm. Skin:     General: Skin is warm and dry. Neurological:      General: No focal deficit present. Mental Status: She is alert and oriented to person, place, and time. Mental status is at baseline. Psychiatric:         Mood and Affect: Mood normal.         Behavior: Behavior normal.         Thought Content: Thought content normal.         Judgment: Judgment normal.                   DIAGNOSTIC RESULTS   LABS:    Labs Reviewed - No data to display    When ordered only abnormal lab results are displayed. All other labs were within normal range or not returned as of this dictation. EKG: When ordered, EKG's are interpreted by the Emergency Department Physician in the absence of a cardiologist.  Please see their note for interpretation of EKG. RADIOLOGY:   Non-plain film images such as CT, Ultrasound and MRI are read by the radiologist. Plain radiographic images are visualized and preliminarily interpreted by the ED Provider with the below findings:        Interpretation per the Radiologist below, if available at the time of this note:    XR RADIUS ULNA RIGHT (2 VIEWS)   Final Result   No acute osseous abnormality of the wrist or forearm. XR WRIST LEFT (MIN 3 VIEWS)   Final Result   No acute osseous abnormality of the wrist or forearm. XR TIBIA FIBULA LEFT (2 VIEWS)   Final Result   No acute osseous abnormality. Degenerative changes in the knee.            XR RADIUS ULNA RIGHT (2 VIEWS)    Result Date: 1/2/2022  EXAMINATION: TWO XRAY VIEWS OF THE RIGHT FOREARM; 3 XRAY VIEWS OF THE LEFT WRIST 1/2/2022 10:28 pm COMPARISON: None. HISTORY: ORDERING SYSTEM PROVIDED HISTORY: fall TECHNOLOGIST PROVIDED HISTORY: Reason for exam:->fall Reason for Exam: PAIN S/P FALL FINDINGS: Forearm: No acute fractures identified. The joints of the elbow appear anatomically aligned. No joint effusion. No soft tissue swelling. No radiopaque foreign body. Atherosclerotic calcifications are noted. Wrist: No fracture. The joints of the wrist are anatomically aligned. Mild degenerative changes of the 1st CMC and triscaphe joints. No focal soft tissue swelling. Vascular calcifications are noted. No radiopaque foreign body. No acute osseous abnormality of the wrist or forearm. XR WRIST LEFT (MIN 3 VIEWS)    Result Date: 1/2/2022  EXAMINATION: TWO XRAY VIEWS OF THE RIGHT FOREARM; 3 XRAY VIEWS OF THE LEFT WRIST 1/2/2022 10:28 pm COMPARISON: None. HISTORY: ORDERING SYSTEM PROVIDED HISTORY: fall TECHNOLOGIST PROVIDED HISTORY: Reason for exam:->fall Reason for Exam: PAIN S/P FALL FINDINGS: Forearm: No acute fractures identified. The joints of the elbow appear anatomically aligned. No joint effusion. No soft tissue swelling. No radiopaque foreign body. Atherosclerotic calcifications are noted. Wrist: No fracture. The joints of the wrist are anatomically aligned. Mild degenerative changes of the 1st CMC and triscaphe joints. No focal soft tissue swelling. Vascular calcifications are noted. No radiopaque foreign body. No acute osseous abnormality of the wrist or forearm. XR TIBIA FIBULA LEFT (2 VIEWS)    Result Date: 1/2/2022  EXAMINATION: 2 XRAY VIEWS OF THE LEFT TIBIA AND FIBULA 1/2/2022 10:28 pm COMPARISON: None.  HISTORY: ORDERING SYSTEM PROVIDED HISTORY: fall laceration TECHNOLOGIST PROVIDED HISTORY: Reason for exam:->fall laceration Reason for Exam: pain s/p fall FINDINGS: Frontal and lateral views. Diffuse soft tissue edema. No definite radiopaque foreign body. Medial distal lower leg surgical clips. Atherosclerosis. No acute fracture. Degenerative changes in the knee. The ankle mortise is congruent. No aggressive skeletal lesion. No acute osseous abnormality. Degenerative changes in the knee. PROCEDURES     I did use 1% likely with epinephrine to anesthetize the 3 cm transverse/oblique laceration lateral aspect of the proximal left lower leg. Once anesthesia was noted it was draped in sterile fashion cleansed with chlorhexidine rinse and irrigated with copious amounts of saline. I did place 2 stitches to close the defect. Minor seepage. Patient will extremity edema. Adaptic, bulky gauze and gentle compression dressing applied. Procedures    CRITICAL CARE TIME   N/A    CONSULTS:  None      EMERGENCY DEPARTMENT COURSE and DIFFERENTIAL DIAGNOSIS/MDM:   Vitals:    Vitals:    01/02/22 2133 01/03/22 0105   BP: (!) 152/96 (!) 152/60   Pulse: 96    Resp: 18    Temp: 98.7 °F (37.1 °C)    TempSrc: Oral    SpO2: 97% 94%   Weight: 150 lb (68 kg)        Patient was given the following medications:  Medications   tetanus-diphth-acell pertussis (BOOSTRIX) injection 0.5 mL (0.5 mLs IntraMUSCular Given 1/2/22 6331)   lidocaine-EPINEPHrine 1 %-1:331486 injection 20 mL (20 mLs IntraDERmal Given 1/2/22 4462)   acetaminophen (TYLENOL) tablet 1,000 mg (1,000 mg Oral Given 1/2/22 3579)           Patient sustaining injury from mechanism on wheelchair causing a laceration lateral aspect of the proximal left lower leg. Primary closure tonight. No foreign material in the wound. X-ray was negative for osseous abnormality or foreign material.  Patient also with complaint of right forearm pain and left wrist pain. X-rays are negative. Patient given Tylenol 1000 mg p.o. for pain control. Recommend continuation Tylenol 1000 mg 3 times daily for primary pain control.   Tetanus shot was updated tonight. Patient is on anticoagulant. Did not strike head or neck. No complaint of headache or neck pain. Bleeding was controlled prior to closure of the wound. Patient is awaiting suture movement approximately 12 days. I did suggest as she has no extremity edema. Patient did express understanding of her diagnosis and the treatment plan. FINAL IMPRESSION      1. Laceration of left lower extremity, initial encounter    2. Fall from wheelchair, initial encounter    3. Need for diphtheria-tetanus-pertussis (Tdap) vaccine    4. Contusion of right forearm, initial encounter    5. Wrist sprain, left, initial encounter          DISPOSITION/PLAN   DISPOSITION Decision To Discharge 01/02/2022 11:37:43 PM      PATIENT REFERRED TO:  Clyde Enamorado MD  [address] [place] [address]  [phone]    In 12 days  For suture removal    [facility]  ED  [facility] [address]  [phone]  Go to   If symptoms worsen      DISCHARGE MEDICATIONS:  New Prescriptions    No medications on file       DISCONTINUED MEDICATIONS:  Discontinued Medications    No medications on file              (Please note that portions of this note were completed with a voice recognition program.  Efforts were made to edit the dictations but occasionally words are mis-transcribed. )    Ria John PA-C (electronically signed)           Ria John PA-C  01/03/22 0122

## 2022-01-03 NOTE — ED NOTES
Completed wound care on pt's left leg laceration. Laceration was cleaned with chlorhexidine, gauze pads, and normal saline. Laceration was appropriately scrubbed out and irrigated with approx 180ml of normal saline. Pt tolerated well.      Meti Lemu  01/02/22 8406

## 2022-01-03 NOTE — ED NOTES
Report by telephone w/ VI Ybarra from [facility].      Donya Ravi RN  01/03/22 8254

## 2022-01-03 NOTE — ED NOTES
[address] Fernanda Frias for d/c. Transported by [employer]. Stable, by stretcher.       Adeola Olivas RN  01/03/22 3284

## 2022-04-20 RX ORDER — ACETAMINOPHEN 500 MG
1000 TABLET ORAL EVERY 8 HOURS PRN
COMMUNITY

## 2022-04-20 RX ORDER — FERROUS SULFATE 325(65) MG
325 TABLET ORAL DAILY
COMMUNITY

## 2022-04-20 RX ORDER — TETRAHYDROZOLINE HCL 0.05 %
1 DROPS OPHTHALMIC (EYE) EVERY 4 HOURS PRN
COMMUNITY

## 2022-04-20 RX ORDER — NYSTATIN 100000 U/G
CREAM TOPICAL PRN
COMMUNITY

## 2022-04-21 ENCOUNTER — HOSPITAL ENCOUNTER (OUTPATIENT)
Age: 72
Setting detail: OUTPATIENT SURGERY
Discharge: OTHER INSTITUTION | End: 2022-04-21
Attending: INTERNAL MEDICINE | Admitting: INTERNAL MEDICINE
Payer: COMMERCIAL

## 2022-04-21 ENCOUNTER — ANESTHESIA EVENT (OUTPATIENT)
Dept: ENDOSCOPY | Age: 72
End: 2022-04-21
Payer: COMMERCIAL

## 2022-04-21 ENCOUNTER — ANESTHESIA (OUTPATIENT)
Dept: ENDOSCOPY | Age: 72
End: 2022-04-21
Payer: COMMERCIAL

## 2022-04-21 VITALS — SYSTOLIC BLOOD PRESSURE: 137 MMHG | DIASTOLIC BLOOD PRESSURE: 54 MMHG | OXYGEN SATURATION: 98 %

## 2022-04-21 VITALS
BODY MASS INDEX: 41.65 KG/M2 | SYSTOLIC BLOOD PRESSURE: 162 MMHG | HEART RATE: 88 BPM | OXYGEN SATURATION: 98 % | RESPIRATION RATE: 14 BRPM | DIASTOLIC BLOOD PRESSURE: 63 MMHG | HEIGHT: 65 IN | WEIGHT: 250 LBS | TEMPERATURE: 96.8 F

## 2022-04-21 DIAGNOSIS — Z86.010 HISTORY OF COLONIC POLYPS: ICD-10-CM

## 2022-04-21 LAB
GLUCOSE BLD-MCNC: 76 MG/DL (ref 70–99)
GLUCOSE BLD-MCNC: 79 MG/DL (ref 70–99)
PERFORMED ON: NORMAL
PERFORMED ON: NORMAL

## 2022-04-21 PROCEDURE — 2709999900 HC NON-CHARGEABLE SUPPLY: Performed by: INTERNAL MEDICINE

## 2022-04-21 PROCEDURE — 6360000002 HC RX W HCPCS: Performed by: NURSE ANESTHETIST, CERTIFIED REGISTERED

## 2022-04-21 PROCEDURE — 3700000000 HC ANESTHESIA ATTENDED CARE: Performed by: INTERNAL MEDICINE

## 2022-04-21 PROCEDURE — 3609010600 HC COLONOSCOPY POLYPECTOMY SNARE/COLD BIOPSY: Performed by: INTERNAL MEDICINE

## 2022-04-21 PROCEDURE — 2500000003 HC RX 250 WO HCPCS: Performed by: NURSE ANESTHETIST, CERTIFIED REGISTERED

## 2022-04-21 PROCEDURE — 88305 TISSUE EXAM BY PATHOLOGIST: CPT

## 2022-04-21 PROCEDURE — 2580000003 HC RX 258: Performed by: ANESTHESIOLOGY

## 2022-04-21 PROCEDURE — 7100000010 HC PHASE II RECOVERY - FIRST 15 MIN: Performed by: INTERNAL MEDICINE

## 2022-04-21 PROCEDURE — 3700000001 HC ADD 15 MINUTES (ANESTHESIA): Performed by: INTERNAL MEDICINE

## 2022-04-21 PROCEDURE — 7100000011 HC PHASE II RECOVERY - ADDTL 15 MIN: Performed by: INTERNAL MEDICINE

## 2022-04-21 RX ORDER — SODIUM CHLORIDE 0.9 % (FLUSH) 0.9 %
5-40 SYRINGE (ML) INJECTION PRN
Status: DISCONTINUED | OUTPATIENT
Start: 2022-04-21 | End: 2022-04-21 | Stop reason: HOSPADM

## 2022-04-21 RX ORDER — PROPOFOL 10 MG/ML
INJECTION, EMULSION INTRAVENOUS PRN
Status: DISCONTINUED | OUTPATIENT
Start: 2022-04-21 | End: 2022-04-21 | Stop reason: SDUPTHER

## 2022-04-21 RX ORDER — SODIUM CHLORIDE, SODIUM LACTATE, POTASSIUM CHLORIDE, CALCIUM CHLORIDE 600; 310; 30; 20 MG/100ML; MG/100ML; MG/100ML; MG/100ML
INJECTION, SOLUTION INTRAVENOUS ONCE
Status: DISCONTINUED | OUTPATIENT
Start: 2022-04-21 | End: 2022-04-21 | Stop reason: HOSPADM

## 2022-04-21 RX ORDER — LIDOCAINE HYDROCHLORIDE 10 MG/ML
0.1 INJECTION, SOLUTION EPIDURAL; INFILTRATION; INTRACAUDAL; PERINEURAL
Status: DISCONTINUED | OUTPATIENT
Start: 2022-04-21 | End: 2022-04-21 | Stop reason: HOSPADM

## 2022-04-21 RX ORDER — LIDOCAINE HYDROCHLORIDE 10 MG/ML
0.3 INJECTION, SOLUTION EPIDURAL; INFILTRATION; INTRACAUDAL; PERINEURAL
Status: DISCONTINUED | OUTPATIENT
Start: 2022-04-21 | End: 2022-04-21 | Stop reason: HOSPADM

## 2022-04-21 RX ORDER — LIDOCAINE HYDROCHLORIDE 10 MG/ML
INJECTION, SOLUTION INFILTRATION; PERINEURAL PRN
Status: DISCONTINUED | OUTPATIENT
Start: 2022-04-21 | End: 2022-04-21 | Stop reason: SDUPTHER

## 2022-04-21 RX ORDER — SODIUM CHLORIDE 9 MG/ML
INJECTION, SOLUTION INTRAVENOUS PRN
Status: DISCONTINUED | OUTPATIENT
Start: 2022-04-21 | End: 2022-04-21 | Stop reason: HOSPADM

## 2022-04-21 RX ORDER — SODIUM CHLORIDE, SODIUM LACTATE, POTASSIUM CHLORIDE, CALCIUM CHLORIDE 600; 310; 30; 20 MG/100ML; MG/100ML; MG/100ML; MG/100ML
INJECTION, SOLUTION INTRAVENOUS CONTINUOUS
Status: DISCONTINUED | OUTPATIENT
Start: 2022-04-21 | End: 2022-04-21 | Stop reason: HOSPADM

## 2022-04-21 RX ORDER — SODIUM CHLORIDE 0.9 % (FLUSH) 0.9 %
5-40 SYRINGE (ML) INJECTION EVERY 12 HOURS SCHEDULED
Status: DISCONTINUED | OUTPATIENT
Start: 2022-04-21 | End: 2022-04-21 | Stop reason: HOSPADM

## 2022-04-21 RX ADMIN — PROPOFOL 60 MG: 10 INJECTION, EMULSION INTRAVENOUS at 11:08

## 2022-04-21 RX ADMIN — PROPOFOL 20 MG: 10 INJECTION, EMULSION INTRAVENOUS at 11:13

## 2022-04-21 RX ADMIN — PROPOFOL 20 MG: 10 INJECTION, EMULSION INTRAVENOUS at 11:16

## 2022-04-21 RX ADMIN — PROPOFOL 10 MG: 10 INJECTION, EMULSION INTRAVENOUS at 11:14

## 2022-04-21 RX ADMIN — PROPOFOL 20 MG: 10 INJECTION, EMULSION INTRAVENOUS at 11:11

## 2022-04-21 RX ADMIN — LIDOCAINE HYDROCHLORIDE 50 MG: 10 INJECTION, SOLUTION INFILTRATION; PERINEURAL at 11:08

## 2022-04-21 RX ADMIN — SODIUM CHLORIDE, POTASSIUM CHLORIDE, SODIUM LACTATE AND CALCIUM CHLORIDE: 600; 310; 30; 20 INJECTION, SOLUTION INTRAVENOUS at 10:49

## 2022-04-21 ASSESSMENT — PAIN - FUNCTIONAL ASSESSMENT: PAIN_FUNCTIONAL_ASSESSMENT: NONE - DENIES PAIN

## 2022-04-21 ASSESSMENT — PAIN SCALES - GENERAL
PAINLEVEL_OUTOF10: 0

## 2022-04-21 NOTE — H&P
Pre-sedation Assessment    History and Physical / Pre-Sedation Assessment  Patient:  Jay Rodriguez   :   1950     Intended Procedure: colonoscopy      HPI: 79 yo w Fe def. Anemia, black stool, wt loss and family h/o colon CA w h/o large polyp 3 yrs ago. No current facility-administered medications on file prior to encounter.      Current Outpatient Medications on File Prior to Encounter   Medication Sig Dispense Refill    ferrous sulfate (IRON 325) 325 (65 Fe) MG tablet Take 325 mg by mouth daily      nystatin (MYCOSTATIN) 286232 UNIT/GM cream Apply topically as needed for Dry Skin      sodium chloride (OCEAN, BABY AYR) 0.65 % nasal spray 1 spray by Nasal route daily      acetaminophen (TYLENOL) 500 MG tablet Take 1,000 mg by mouth every 8 hours as needed for Pain      tetrahydrozoline 0.05 % ophthalmic solution Place 1 drop into both eyes every 4 hours as needed      spironolactone (ALDACTONE) 50 MG tablet Take 1 tablet by mouth daily 30 tablet 0    pantoprazole (PROTONIX) 40 MG tablet Take 1 tablet by mouth daily 30 tablet 0    tiZANidine (ZANAFLEX) 4 MG tablet Take 1 tablet by mouth 2 times daily (Patient taking differently: Take 4 mg by mouth daily ) 10 tablet 0    carboxymethylcellulose PF (THERATEARS) 1 % GEL ophthalmic gel Place 1 drop into both eyes as needed for Dry Eyes (Patient taking differently: Place 1 drop into both eyes daily ) 1 Bottle 0    apixaban (ELIQUIS) 5 MG TABS tablet Take by mouth 2 times daily      diphenhydrAMINE (BENADRYL) 25 MG capsule Take 25 mg by mouth every 6 hours as needed for Itching      vitamin D (CHOLECALCIFEROL) 125 MCG (5000 UT) CAPS capsule Take 50,000 Units by mouth once a week       Sodium Phosphates (FLEET) 7-19 GM/118ML Place 1 enema rectally daily as needed       fluticasone (FLONASE) 50 MCG/ACT nasal spray 1 spray by Each Nostril route daily      polyethylene glycol (MIRALAX) 17 g packet Take 17 g by mouth daily as needed for Constipation CHOLECYSTECTOMY      CORONARY ARTERY BYPASS GRAFT MAZE PROCEDURE  1995    triple bypass       Nurses notes reviewed and agreed. Medications reviewed  Allergies: Allergies   Allergen Reactions    Ceclor [Cefaclor]      Sore throat    Lisinopril Other (See Comments)     cough           Physical Exam:  Vital Signs: BP (!) 141/55   Pulse 89   Temp 96.8 °F (36 °C) (Temporal)   Resp 18   Ht 5' 5\" (1.651 m)   Wt 250 lb (113.4 kg)   SpO2 95%   BMI 41.60 kg/m²  Body mass index is 41.6 kg/m². Airway:Normal  Cardiac:Normal  Pulmonary:Normal  Abdomen:Normal  Specific to procedure: none      Pre-Procedure Assessment/Plan:  ASA 3 - Patient with moderate systemic disease with functional limitations  Mallampati II  Level of Sedation Plan:Deep sedation    Post Procedure plan: Return to same level of care    I assessed the patient and find that the patient is in satisfactory condition to proceed with the planned procedure and sedation plan. I have explained the risk, benefits, and alternatives to the procedure. The patient understands and agrees to proceed.   Yes    Adán Winston MD       (O) [phone]          Adán Winston MD  10:45 AM 4/21/2022

## 2022-04-21 NOTE — PROGRESS NOTES
Transport to  patient now via stretcher. Patient awake and oriented. No voiced discomforts. Anxious to get back to [facility] home to watch tv show.

## 2022-04-21 NOTE — PROGRESS NOTES
Received report from Mireya Constantino [facility]. Patient awake and oriented, no requests, denies pain or nausea. Off monitor waiting for transport ride back to nursing home. Mireya Constantino stated she called report to nursing home.

## 2022-04-21 NOTE — OP NOTE
Colonoscopy Note    Patient:   Kimberlee Pete    YOB: 1950    Facility:   [facility] [Outpatient]  Referring/PCP: Vitkor Groves MD  Procedure:   Colonoscopy --diagnostic  Date:     4/21/2022  Endoscopist:  Miranda Wolf MD, MD    Preoperative Diagnosis: Fe def anemia w previous adenomatous polyp and family history of colon cancer. Postoperative Diagnosis: Flat polyp in cecum removed    Anesthesia: MAC  Estimated blood loss: < 5 ml  Complications:  None    Description of Procedure:  Informed consent was obtained from the patient after explanation of the procedure including indications, description of the procedure,  benefits and possible risks and complications of the procedure, and alternatives. Questions were answered. The patient's history was reviewed and a directed physical examination was performed prior to the procedure. Patient was monitored throughout the procedure with pulse oximetry and periodic assessment of vital signs. Patient was sedated as noted above. The Nursing staff and I performed a time out. With the patient initially in the left lateral decubitus position, a digital rectal examination was performed and revealed negative without mass, lesions or tenderness. The Olympus video colonoscope was placed in the patient's rectum and advanced without difficulty  to the cecum, which was identified by the ileocecal valve. Photographs were taken. The prep was fair. Examination of the mucosa was performed during both introduction and withdrawal of the colonoscope. Retroflexed view of the rectum was performed. Withdrawal time was 8 minutes. Findings:     1. Flat 1.2 cm cecal bottom polyp removed piecemeal w cold-snare  2. Fair prep and small hemorrhoids     Recommendations:  Await pathology. Repeat colonoscopy in 1 years. with 2-day prep. Hold Eliquis for 3 more days.     Miranda Wolf MD       (P) [phone]          Miranda Wolf MD, MD

## 2022-04-21 NOTE — PROGRESS NOTES
Pt admitted to preop via transportion service  from [facility] stretcher. Pt alert and oriented. States able to sign own consents. Report given from Iraj (face to face)  from [facility] ([phone] ) contact info. Pt from [facility]. Pt able to state the planned procedure for today and gave consent for procedure. Pt has her own glasses and upper denture present. Pt has depend diaper in place.

## 2022-04-21 NOTE — ANESTHESIA POSTPROCEDURE EVALUATION
Department of Anesthesiology  Postprocedure Note    Patient: Kimberlee Pete  MRN: [id number]  YOB: 1950  Date of evaluation: 4/21/2022  Time:  1:02 PM     Procedure Summary     Date: 04/21/22 Room / Location: [facility]    Anesthesia Start: 2752 Anesthesia Stop: 1128    Procedure: COLONOSCOPY POLYPECTOMY SNARE/COLD BIOPSY (N/A ) Diagnosis:       History of colonic polyps      (HISTORY OF COLON POLYPS)    Surgeons: Miranda Wolf MD Responsible Provider: Cris Thomas MD    Anesthesia Type: MAC ASA Status: 4          Anesthesia Type: MAC    Simon Phase I: Simon Score: 10    Simon Phase II: Simon Score: 10    Last vitals: Reviewed and per EMR flowsheets.        Anesthesia Post Evaluation    Patient location during evaluation: PACU  Patient participation: complete - patient participated  Level of consciousness: awake and alert  Pain score: 0  Airway patency: patent  Nausea & Vomiting: no nausea and no vomiting  Complications: no  Cardiovascular status: blood pressure returned to baseline  Respiratory status: acceptable  Hydration status: stable

## 2022-04-21 NOTE — ANESTHESIA PRE PROCEDURE
Department of Anesthesiology  Preprocedure Note       Name:  Betzy Contreras   Age:  80 y.o.  :  1950                                          MRN:  [id number]         Date:  2022      Surgeon: Dory George):  Krys Yap MD    Procedure: Procedure(s):  COLONOSCOPY    Medications prior to admission:   Prior to Admission medications    Medication Sig Start Date End Date Taking?  Authorizing Provider   ferrous sulfate (IRON 325) 325 (65 Fe) MG tablet Take 325 mg by mouth daily   Yes Historical Provider, MD   nystatin (MYCOSTATIN) 908708 UNIT/GM cream Apply topically as needed for Dry Skin   Yes Historical Provider, MD   sodium chloride (OCEAN, BABY AYR) 0.65 % nasal spray 1 spray by Nasal route daily   Yes Historical Provider, MD   acetaminophen (TYLENOL) 500 MG tablet Take 1,000 mg by mouth every 8 hours as needed for Pain   Yes Historical Provider, MD   tetrahydrozoline 0.05 % ophthalmic solution Place 1 drop into both eyes every 4 hours as needed   Yes Historical Provider, MD   spironolactone (ALDACTONE) 50 MG tablet Take 1 tablet by mouth daily  Patient not taking: Reported on 20   Nikki Madsen MD   pantoprazole (PROTONIX) 40 MG tablet Take 1 tablet by mouth daily 20   Nikki Madsen MD   tiZANidine (ZANAFLEX) 4 MG tablet Take 1 tablet by mouth 2 times daily  Patient taking differently: Take 4 mg by mouth daily  20   Nikki Madsen MD   carboxymethylcellulose PF (THERATEARS) 1 % GEL ophthalmic gel Place 1 drop into both eyes as needed for Dry Eyes  Patient taking differently: Place 1 drop into both eyes daily  20   Nikki Madsen MD   apixaban (ELIQUIS) 5 MG TABS tablet Take by mouth 2 times daily    Historical Provider, MD   diphenhydrAMINE (BENADRYL) 25 MG capsule Take 25 mg by mouth every 6 hours as needed for Itching    Historical Provider, MD   vitamin D (CHOLECALCIFEROL) 125 MCG (5000 UT) CAPS capsule Take 50,000 Units by mouth once a week Historical Provider, MD   Sodium Phosphates (FLEET) 7-19 GM/118ML Place 1 enema rectally daily as needed   Patient not taking: Reported on 4/21/2022    Historical Provider, MD   fluticasone (FLONASE) 50 MCG/ACT nasal spray 1 spray by Each Nostril route daily    Historical Provider, MD   polyethylene glycol (MIRALAX) 17 g packet Take 17 g by mouth daily as needed for Constipation    Historical Provider, MD   insulin glargine (LANTUS) 100 UNIT/ML injection vial Inject 16-24 Units into the skin in the morning and at bedtime 24 units in am 16 units at bedtime    Historical Provider, MD   levothyroxine (SYNTHROID) 125 MCG tablet Take 150 mcg by mouth Daily     Historical Provider, MD   insulin aspart (NOVOLOG) 100 UNIT/ML injection cartridge Inject into the skin 4 times daily (before meals and nightly) Sliding scale     Historical Provider, MD   ondansetron (ZOFRAN) 4 MG tablet Take 4 mg by mouth every 6 hours as needed for Nausea or Vomiting     Historical Provider, MD   Dulaglutide (TRULICITY) 1.5 WE/3.1TY SOPN Inject 1.5 mg into the skin once a week    Historical Provider, MD   sertraline (ZOLOFT) 100 MG tablet Take 100 mg by mouth nightly     Historical Provider, MD       Current medications:    Current Facility-Administered Medications   Medication Dose Route Frequency Provider Last Rate Last Admin    lactated ringers infusion   IntraVENous Once Michelle Chavez MD        lidocaine PF 1 % injection 0.1 mL  0.1 mL IntraDERmal Once PRN Michelle Chavez MD        lidocaine PF 1 % injection 0.3 mL  0.3 mL IntraDERmal Once PRN Yair Rodriges MD        lactated ringers infusion   IntraVENous Continuous Yair Rodriges  mL/hr at 04/21/22 1049 New Bag at 04/21/22 1049    sodium chloride flush 0.9 % injection 5-40 mL  5-40 mL IntraVENous 2 times per day Yair Rodriges MD        sodium chloride flush 0.9 % injection 5-40 mL  5-40 mL IntraVENous PRN Yair Rodriges MD        0.9 % sodium chloride infusion IntraVENous PRN Pamela Manuel MD           Allergies:     Allergies   Allergen Reactions    Ceclor [Cefaclor]      Sore throat    Lisinopril Other (See Comments)     cough       Problem List:    Patient Active Problem List   Diagnosis Code    Hyperkalemia E87.5    Acute kidney injury superimposed on chronic kidney disease (HCC) N17.9, N18.9    Stage 3 chronic kidney disease N18.30    Atrial fibrillation with slow ventricular response ([facility]) I48.91    DIONNE (acute kidney injury) ([facility] N17.9    Type 2 diabetes mellitus, with long-term current use of insulin ([facility]) E11.9, Z79.4    Essential hypertension I10    GERD (gastroesophageal reflux disease) K21.9    Dementia ([facility] F03.90    Acquired hypothyroidism E03.9    Morbid obesity ([facility] E66.01    CHF (congestive heart failure) ([facility]) I50.9    CAD (coronary artery disease) I25.10    Fluid overload E87.70    Acute on chronic diastolic CHF (congestive heart failure) ([facility]) I50.33    Anasarca R60.1    Cirrhosis of liver with ascites ([facility]) K74.60, R18.8       Past Medical History:        Diagnosis Date    Anxiety     Arthritis     Atrial fibrillation (HCC)     CAD (coronary artery disease)     CHF (congestive heart failure) ([facility])     Chronic pain syndrome     Cir[facility]osis [facility]     Depression     Diabetes mellitus (HCC)     GERD (gastroesophageal reflux disease)     Hyperlipidemia     Hypertension     Kidney disease     stage 3    Pulmonary hypertension ([facility]     Thyroid disease     hypothyroidism    Vitamin D deficiency        Past Surgical History:        Procedure Laterality Date    BREAST REDUCTION SURGERY      CHOLECYSTECTOMY      CORONARY ARTERY BYPASS GRAFT MAZE PROCEDURE  1995    triple bypass       Social History:    Social History     Tobacco Use    Smoking status: Never Smoker    Smokeless tobacco: Never Used   Substance Use Topics    Alcohol use: Never                                Counseling given: Not Answered      Vital Signs (Current):   Vitals:    04/20/22 1550 04/21/22 1036   BP:  (!) 141/55   Pulse:  89   Resp:  18   Temp:  96.8 °F (36 °C)   TempSrc:  Temporal   SpO2:  95%   Weight: 268 lb (121.6 kg) 250 lb (113.4 kg)   Height: 5' 1\" (1.549 m) 5' 5\" (1.651 m)                                              BP Readings from Last 3 Encounters:   04/21/22 (!) 141/55   01/03/22 (!) 142/67   01/22/21 110/60       NPO Status:                                                                                 BMI:   Wt Readings from Last 3 Encounters:   04/21/22 250 lb (113.4 kg)   01/03/22 260 lb (117.9 kg)   01/22/21 249 lb 12.8 oz (113.3 kg)     Body mass index is 41.6 kg/m².     CBC:   Lab Results   Component Value Date    WBC 4.5 12/23/2020    RBC 2.92 12/23/2020    HGB 7.7 12/23/2020    HCT 24.2 12/23/2020    MCV 82.8 12/23/2020    RDW 19.6 12/23/2020     12/23/2020       CMP:   Lab Results   Component Value Date     12/24/2020    K 3.8 12/24/2020    K 4.4 12/04/2020    CL 94 12/24/2020    CO2 28 12/24/2020    BUN 53 12/24/2020    CREATININE 2.2 12/24/2020    GFRAA 27 12/24/2020    AGRATIO 0.9 12/04/2020    LABGLOM 22 12/24/2020    GLUCOSE 198 12/24/2020    PROT 6.5 12/04/2020    CALCIUM 10.7 12/24/2020    BILITOT 0.4 12/04/2020    ALKPHOS 108 12/04/2020    AST 24 12/04/2020    ALT 8 12/04/2020       POC Tests:   Recent Labs     04/21/22  1034   POCGLU 79       Coags:   Lab Results   Component Value Date    PROTIME 23.7 12/04/2020    INR 2.03 12/04/2020       HCG (If Applicable): No results found for: PREGTESTUR, PREGSERUM, HCG, HCGQUANT     ABGs: No results found for: PHART, PO2ART, LPX9GWY, DDI1AIY, BEART, J6NKUAAF     Type & Screen (If Applicable):  No results found for: LABABO, LABRH    Drug/Infectious Status (If Applicable):  No results found for: HIV, HEPCAB    COVID-19 Screening (If Applicable):   Lab Results   Component Value Date    COVID19 Not Detected 12/24/2020    COVID19 NOT DETECTED 08/21/2020           Anesthesia Evaluation  Patient summary reviewed and Nursing notes reviewed  Airway: Mallampati: II  TM distance: >3 FB   Neck ROM: full  Mouth opening: > = 3 FB Dental:    (+) upper dentures      Pulmonary:Negative Pulmonary ROS   (+) decreased breath sounds,                             Cardiovascular:    (+) hypertension:, CAD:, CABG/stent: no interval change, CHF:,         Rhythm: regular  Rate: normal                    Neuro/Psych:   (+) psychiatric history:            GI/Hepatic/Renal:   (+) GERD:, liver disease:, morbid obesity          Endo/Other:    (+) DiabetesType II DM, , hypothyroidism::., .                 Abdominal:   (+) obese,           Vascular: negative vascular ROS. Other Findings:             Anesthesia Plan      MAC     ASA 4       Induction: intravenous. Anesthetic plan and risks discussed with patient. Plan discussed with CRNA.                   Beryl Olvera MD   4/21/2022

## 2022-05-26 ENCOUNTER — APPOINTMENT (OUTPATIENT)
Dept: GENERAL RADIOLOGY | Age: 72
End: 2022-05-26
Payer: COMMERCIAL

## 2022-05-26 ENCOUNTER — HOSPITAL ENCOUNTER (EMERGENCY)
Age: 72
Discharge: NURSING, REHAB OR LONG-TERM CARE | End: 2022-05-27
Payer: COMMERCIAL

## 2022-05-26 DIAGNOSIS — R05.9 COUGH: Primary | ICD-10-CM

## 2022-05-26 PROCEDURE — 71045 X-RAY EXAM CHEST 1 VIEW: CPT

## 2022-05-26 PROCEDURE — 99284 EMERGENCY DEPT VISIT MOD MDM: CPT

## 2022-05-26 PROCEDURE — 80053 COMPREHEN METABOLIC PANEL: CPT

## 2022-05-26 PROCEDURE — 83880 ASSAY OF NATRIURETIC PEPTIDE: CPT

## 2022-05-26 PROCEDURE — 83605 ASSAY OF LACTIC ACID: CPT

## 2022-05-26 PROCEDURE — 87635 SARS-COV-2 COVID-19 AMP PRB: CPT

## 2022-05-26 PROCEDURE — 85025 COMPLETE CBC W/AUTO DIFF WBC: CPT

## 2022-05-26 PROCEDURE — 87804 INFLUENZA ASSAY W/OPTIC: CPT

## 2022-05-27 VITALS
RESPIRATION RATE: 16 BRPM | HEIGHT: 65 IN | BODY MASS INDEX: 43.49 KG/M2 | TEMPERATURE: 99.9 F | OXYGEN SATURATION: 92 % | HEART RATE: 110 BPM | DIASTOLIC BLOOD PRESSURE: 75 MMHG | WEIGHT: 261 LBS | SYSTOLIC BLOOD PRESSURE: 164 MMHG

## 2022-05-27 LAB
A/G RATIO: 0.7 (ref 1.1–2.2)
ALBUMIN SERPL-MCNC: 3.2 G/DL (ref 3.4–5)
ALP BLD-CCNC: 157 U/L (ref 40–129)
ALT SERPL-CCNC: 13 U/L (ref 10–40)
ANION GAP SERPL CALCULATED.3IONS-SCNC: 14 MMOL/L (ref 3–16)
AST SERPL-CCNC: 17 U/L (ref 15–37)
BASOPHILS ABSOLUTE: 0.1 K/UL (ref 0–0.2)
BASOPHILS RELATIVE PERCENT: 1 %
BILIRUB SERPL-MCNC: 1.5 MG/DL (ref 0–1)
BUN BLDV-MCNC: 34 MG/DL (ref 7–20)
CALCIUM SERPL-MCNC: 10.7 MG/DL (ref 8.3–10.6)
CHLORIDE BLD-SCNC: 100 MMOL/L (ref 99–110)
CO2: 19 MMOL/L (ref 21–32)
CREAT SERPL-MCNC: 1.3 MG/DL (ref 0.6–1.2)
EOSINOPHILS ABSOLUTE: 0.2 K/UL (ref 0–0.6)
EOSINOPHILS RELATIVE PERCENT: 3 %
GFR AFRICAN AMERICAN: 49
GFR NON-AFRICAN AMERICAN: 40
GLUCOSE BLD-MCNC: 128 MG/DL (ref 70–99)
HCT VFR BLD CALC: 37.4 % (ref 36–48)
HEMOGLOBIN: 12.2 G/DL (ref 12–16)
LACTIC ACID: 1 MMOL/L (ref 0.4–2)
LYMPHOCYTES ABSOLUTE: 0.9 K/UL (ref 1–5.1)
LYMPHOCYTES RELATIVE PERCENT: 14 %
MCH RBC QN AUTO: 30.3 PG (ref 26–34)
MCHC RBC AUTO-ENTMCNC: 32.7 G/DL (ref 31–36)
MCV RBC AUTO: 92.7 FL (ref 80–100)
METAMYELOCYTES RELATIVE PERCENT: 1 %
MONOCYTES ABSOLUTE: 0.7 K/UL (ref 0–1.3)
MONOCYTES RELATIVE PERCENT: 10 %
NEUTROPHILS ABSOLUTE: 4.8 K/UL (ref 1.7–7.7)
NEUTROPHILS RELATIVE PERCENT: 71 %
PDW BLD-RTO: 14.8 % (ref 12.4–15.4)
PLATELET # BLD: 150 K/UL (ref 135–450)
PLATELET SLIDE REVIEW: ABNORMAL
PMV BLD AUTO: 8.2 FL (ref 5–10.5)
POTASSIUM SERPL-SCNC: 4.2 MMOL/L (ref 3.5–5.1)
PRO-BNP: 1475 PG/ML (ref 0–124)
RAPID INFLUENZA  B AGN: NEGATIVE
RAPID INFLUENZA A AGN: NEGATIVE
RBC # BLD: 4.03 M/UL (ref 4–5.2)
RBC # BLD: NORMAL 10*6/UL
SARS-COV-2, NAAT: NOT DETECTED
SLIDE REVIEW: ABNORMAL
SODIUM BLD-SCNC: 133 MMOL/L (ref 136–145)
TOTAL PROTEIN: 7.7 G/DL (ref 6.4–8.2)
WBC # BLD: 6.6 K/UL (ref 4–11)

## 2022-05-27 NOTE — ED PROVIDER NOTES
[address] [place] [address]  EMERGENCY DEPARTMENT ENCOUNTER      This patient was not seen and evaluated by the attending physician. Pt Name: Julia Ta  MRN: [id number]  [place] 1950  Date of evaluation: 5/26/2022  Provider: DANIELLE Billingsley - CNP-C  PCP: Michael Torres MD      History provided by the patient. CHIEFCOMPLAINT:     Chief Complaint   Patient presents with    Cough     Productive cough for a few days        HISTORY OF PRESENT ILLNESS:      Julia Ta is a 80 y.o. female who presents to [address] [place] [address]  ED with complaints of cough. Patient presented to the emerged from today with complaints of productive cough for the last few days that she does generally does not feel good. Denies fevers, does have a history of congestive heart failure. She is here for further evaluation. LOCATION:-  QUALITY:-  SEVERITY:-  DURATION:-  MODIFYING FACTORS:-    Nursing Notes were reviewed     REVIEW OF SYSTEMS:     Review of Systems  All systems, a total of 10, are reviewed and negative except for those that were just noted in history present illness.         PAST MEDICAL HISTORY:     Past Medical History:   Diagnosis Date    Anxiety     Arthritis     Atrial fibrillation ([address]     CAD (coronary artery disease)     CHF (congestive heart failure) (HCC)     Chronic pain syndrome     Cirrhosis ([facility]     Depression     Diabetes mellitus ([address]     GERD (gastroesophageal reflux disease)     Hyperlipidemia     Hypertension     Kidney disease     stage 3    Pulmonary hypertension ([facility]     Thyroid disease     hypothyroidism    Vitamin D deficiency          SURGICAL HISTORY:      Past Surgical History:   Procedure Laterality Date    BREAST REDUCTION SURGERY      CHOLECYSTECTOMY      COLONOSCOPY N/A 4/21/2022    COLONOSCOPY POLYPECTOMY SNARE/COLD BIOPSY performed by Cm Potter MD at [address] [place] [address] triple bypass         CURRENT MEDICATIONS:       Discharge Medication List as of 5/27/2022  2:00 AM      CONTINUE these medications which have NOT CHANGED    Details   ferrous sulfate (IRON 325) 325 (65 Fe) MG tablet Take 325 mg by mouth dailyHistorical Med      nystatin (MYCOSTATIN) 353797 UNIT/GM cream Apply topically as needed for Dry Skin, Topical, PRN, Historical Med      sodium chloride (OCEAN, BABY AYR) 0.65 % nasal spray 1 spray by Nasal route dailyHistorical Med      acetaminophen (TYLENOL) 500 MG tablet Take 1,000 mg by mouth every 8 hours as needed for PainHistorical Med      tetrahydrozoline 0.05 % ophthalmic solution Place 1 drop into both eyes every 4 hours as neededHistorical Med      spironolactone (ALDACTONE) 50 MG tablet Take 1 tablet by mouth daily, Disp-30 tablet, R-0NO PRINT      pantoprazole (PROTONIX) 40 MG tablet Take 1 tablet by mouth daily, Disp-30 tablet, R-0NO PRINT      tiZANidine (ZANAFLEX) 4 MG tablet Take 1 tablet by mouth 2 times daily, Disp-10 tablet, R-0NO PRINT      carboxymethylcellulose PF (THERATEARS) 1 % GEL ophthalmic gel Place 1 drop into both eyes as needed for Dry Eyes, Disp-1 Bottle, R-0NO PRINT      apixaban (ELIQUIS) 5 MG TABS tablet Take by mouth 2 times dailyHistorical Med      diphenhydrAMINE (BENADRYL) 25 MG capsule Take 25 mg by mouth every 6 hours as needed for ItchingHistorical Med      vitamin D (CHOLECALCIFEROL) 125 MCG (5000 UT) CAPS capsule Take 50,000 Units by mouth once a week Historical Med      Sodium Phosphates (FLEET) 7-19 GM/118ML Place 1 enema rectally daily as needed Historical Med      fluticasone (FLONASE) 50 MCG/ACT nasal spray 1 spray by Each Nostril route dailyHistorical Med      polyethylene glycol (MIRALAX) 17 g packet Take 17 g by mouth daily as needed for ConstipationHistorical Med      insulin glargine (LANTUS) 100 UNIT/ML injection vial Inject 16-24 Units into the skin in the morning and at bedtime 24 units in am 16 units at bedtimeHistorical Med      levothyroxine (SYNTHROID) 125 MCG tablet Take 150 mcg by mouth Daily Historical Med      insulin aspart (NOVOLOG) 100 UNIT/ML injection cartridge Inject into the skin 4 times daily (before meals and nightly) Sliding scale Historical Med      ondansetron (ZOFRAN) 4 MG tablet Take 4 mg by mouth every 6 hours as needed for Nausea or Vomiting Historical Med      Dulaglutide (TRULICITY) 1.5 YG/9.7RF SOPN Inject 1.5 mg into the skin once a weekHistorical Med      sertraline (ZOLOFT) 100 MG tablet Take 100 mg by mouth nightly Historical Med               ALLERGIES:    Ceclor [cefaclor] and Lisinopril    FAMILY HISTORY:     History reviewed. No pertinent family history. SOCIAL HISTORY:     Social History     Socioeconomic History    Marital status:      Spouse name: None    Number of children: None    Years of education: None    Highest education level: None   Occupational History    None   Tobacco Use    Smoking status: Never Smoker    Smokeless tobacco: Never Used   Vaping Use    Vaping Use: Never used   Substance and Sexual Activity    Alcohol use: Never    Drug use: Never    Sexual activity: None   Other Topics Concern    None   Social History Narrative    None     Social Determinants of Health     Financial Resource Strain:     Difficulty of Paying Living Expenses: Not on file   Food Insecurity:     Worried About Running Out of Food in the Last Year: Not on file    Jaret of Food in the Last Year: Not on file   Transportation Needs:     Lack of Transportation (Medical): Not on file    Lack of Transportation (Non-Medical):  Not on file   Physical Activity:     Days of Exercise per Week: Not on file    Minutes of Exercise per Session: Not on file   Stress:     Feeling of Stress : Not on file   Social Connections:     Frequency of Communication with Friends and Family: Not on file    Frequency of Social Gatherings with Friends and Family: Not on file   Creston Sicard Attends Druze Services: Not on file    Active Member of Clubs or Organizations: Not on file    Attends Club or Organization Meetings: Not on file    Marital Status: Not on file   Intimate Partner Violence:     Fear of Current or Ex-Partner: Not on file    Emotionally Abused: Not on file    Physically Abused: Not on file    Sexually Abused: Not on file   Housing Stability:     Unable to Pay for Housing in the Last Year: Not on file    Number of [place] in the Last Year: Not on file    Unstable Housing in the Last Year: Not on file       SCREENINGS:    Carleen Coma Scale  Eye Opening: Spontaneous  Best Verbal Response: Oriented  Best Motor Response: Obeys commands  [place] Coma Scale Score: 15        PHYSICAL EXAM:       ED Triage Vitals [05/26/22 2207]   BP Temp Temp Source Heart Rate Resp SpO2 Height Weight   119/83 99.9 °F (37.7 °C) Oral (!) 110 16 95 % 5' 5\" (1.651 m) 261 lb (118.4 kg)       Physical Exam    CONSTITUTIONAL: Awake and alert. Cooperative. Well-developed. Well-nourished. Vitals:    05/26/22 2237 05/26/22 2307 05/26/22 2337 05/27/22 0016   BP: 121/74 (!) 141/66 (!) 155/115 (!) 164/75   Pulse:       Resp:       Temp:       TempSrc:       SpO2: 90% 92%  92%   Weight:       Height:         HENT: Normocephalic. Atraumatic. External ears normal, without discharge. TMs clear bilaterally. Nonasal discharge. Oropharynx clear, no erythema. Mucous membranes moist.  EYES: Conjunctiva non-injected, nolid abnormalities noted. No scleral icterus. PERRL. EOM's grossly intact. Anterior chambers clear. NECK: Supple. Normal ROM. No meningismus. No thyroid tenderness or swelling noted. CARDIOVASCULAR: RRR. No Murmer. No carotid bruits. PULMONARY/CHEST WALL: Effort normal. No tachypnea. Lungs clear to ausculation. ABDOMEN: Normal BS. Soft. Nondistended. Obese. No tenderness to palpation. No guarding. No hernias noted. No splenomegaly. Back: Spine is midline. No ecchymosis.  No crepituson palpation. No obvious subluxation of vertebral column. No saddle anesthesia or evidence of cauda equina. /ANORECTAL: Not assessed  MUSKULOSKELETAL: Normal ROM. No acute deformities. 3+ pitting edema. No tenderness to palpate. SKIN: Warm and dry. NEUROLOGICAL:  GCS 15. CN II-XII grossly intact. Strength is 5/5 in all extremities and sensation is intact. PSYCHIATRIC: Normal affect, normal insight and judgement. Alert and oriented x 3.         DIAGNOSTIC RESULTS:     LABS:    Results for orders placed or performed during the hospital encounter of 05/26/22   Rapid influenza A/B antigens    Specimen: Nasopharyngeal   Result Value Ref Range    Rapid Influenza A Ag Negative Negative    Rapid Influenza B Ag Negative Negative   SARS-CoV-2 NAAT (Rapid)    Specimen: Nasopharyngeal Swab   Result Value Ref Range    SARS-CoV-2, NAAT Not Detected Not Detected   CBC with Auto Differential   Result Value Ref Range    WBC 6.6 4.0 - 11.0 K/uL    RBC 4.03 4.00 - 5.20 M/uL    Hemoglobin 12.2 12.0 - 16.0 g/dL    Hematocrit 37.4 36.0 - 48.0 %    MCV 92.7 80.0 - 100.0 fL    MCH 30.3 26.0 - 34.0 pg    MCHC 32.7 31.0 - 36.0 g/dL    RDW 14.8 12.4 - 15.4 %    Platelets 542 736 - 368 K/uL    MPV 8.2 5.0 - 10.5 fL    PLATELET SLIDE REVIEW Decreased     SLIDE REVIEW see below     Neutrophils % 71.0 %    Lymphocytes % 14.0 %    Monocytes % 10.0 %    Eosinophils % 3.0 %    Basophils % 1.0 %    Neutrophils Absolute 4.8 1.7 - 7.7 K/uL    Lymphocytes Absolute 0.9 (L) 1.0 - 5.1 K/uL    Monocytes Absolute 0.7 0.0 - 1.3 K/uL    Eosinophils Absolute 0.2 0.0 - 0.6 K/uL    Basophils Absolute 0.1 0.0 - 0.2 K/uL    Metamyelocytes Relative 1 (A) %    RBC Morphology Normal    Comprehensive Metabolic Panel   Result Value Ref Range    Sodium 133 (L) 136 - 145 mmol/L    Potassium 4.2 3.5 - 5.1 mmol/L    Chloride 100 99 - 110 mmol/L    CO2 19 (L) 21 - 32 mmol/L    Anion Gap 14 3 - 16    Glucose 128 (H) 70 - 99 mg/dL    BUN 34 (H) 7 - 20 mg/dL    CREATININE 1.3 (H) 0.6 - 1.2 mg/dL    GFR Non- 40 (A) >60    GFR  49 (A) >60    Calcium 10.7 (H) 8.3 - 10.6 mg/dL    Total Protein 7.7 6.4 - 8.2 g/dL    Albumin 3.2 (L) 3.4 - 5.0 g/dL    Albumin/Globulin Ratio 0.7 (L) 1.1 - 2.2    Total Bilirubin 1.5 (H) 0.0 - 1.0 mg/dL    Alkaline Phosphatase 157 (H) 40 - 129 U/L    ALT 13 10 - 40 U/L    AST 17 15 - 37 U/L   Lactic Acid   Result Value Ref Range    Lactic Acid 1.0 0.4 - 2.0 mmol/L   Brain Natriuretic Peptide   Result Value Ref Range    Pro-BNP 1,475 (H) 0 - 124 pg/mL         RADIOLOGY:  All x-ray studies are viewed/reviewed by me. Formal interpretations per the radiologist are as follows:      XR CHEST PORTABLE   Final Result   Cardiomegaly and pulmonary venous congestion without overt CHF. EKG:  See EKG interpretation by an attending physician. PROCEDURES:   N/A    CRITICAL CARE TIME:   N/A    CONSULTS:  None      EMERGENCY DEPARTMENT COURSE andDIFFERENTIAL DIAGNOSIS/MDM:   Vitals:    Vitals:    05/26/22 2237 05/26/22 2307 05/26/22 2337 05/27/22 0016   BP: 121/74 (!) 141/66 (!) 155/115 (!) 164/75   Pulse:       Resp:       Temp:       TempSrc:       SpO2: 90% 92%  92%   Weight:       Height:           Patient wasgiven the following medications:  Medications - No data to display      Patient was evaluated independently by myself with the attending physician available for consultation. patient presented to the ER with complaints of a cough, patient states she has had persistent cough for a few days. No fevers. Workup is reassuring. No leukocytosis. Renal function baseline with normal lactic acid. Chest xray showed no overt failure and BNP was not significantly elevated. On reexamination patient is eating ice cream and states she feels fine. She was discharged home in good condition. Patient laboratory studies, radiographic imaging, and assessment were all discussed with the patient and/orpatient family.   There was shared decision-making between myself as well as the patient and/or their surrogate and we are all in agreement with discharge. There was an opportunity for questions and all questions were answered tothe best of my ability and to the satisfaction of the patient and/or patient family. FINAL IMPRESSION:      1.  Cough          DISPOSITION/PLAN:   DISPOSITION Decision To Discharge      PATIENT REFERRED TO:  Michael Torres MD  [address] [facility] [address] [place] [address]  [phone]    Call   For follow up      DISCHARGE MEDICATIONS:  Discharge Medication List as of 5/27/2022  2:00 AM                     (Please note thatportions of this note were completed with a voice recognition program.  Efforts were made to edit the dictations, but occasionally words are mis-transcribed.)    DANIELLE Griffin - CNP-C (electronicallysigned)        DANIELLE Griffin CNP  05/28/22 9629

## 2022-05-27 NOTE — ED NOTES
Attempted to call report to facility. Once again did not get anyone to answer. Sending encounter summary as report d/t not answering.      Sydni Abdul RN  05/27/22 2152

## 2023-02-04 NOTE — PLAN OF CARE
Protect patient from fall injury by keeping bed in low position, use of non skid socks and bed alarm system. Keep belongings and call light with reach at all times and following fall protocol. Assess skin for increasing breakdown. Turn patient  every 2 hours. Keep heels off bed. Use every dry fabric in abdominal and leg folds. Pad mora tube with  pillow case to prevent pressure areas between thighs. Encourage low sodium diet and monitor I and o and any ordered fluid restriction. Keep HOB 30 decrees, assess respiratory status. Monitor blood glucose levels and amount of food taken in. pt accompanied by cousins presents with abnormal behavior. pt responds "I don't know" to all questions asked.  pt hyperverbal in triage, perseverating on actions staff members from a facility and family members who are misleading her. per cousin pt has not been taking her meds which they believe are for bipolar. states pt has been wandering, up since 4am, was driving and ran out of gas, may have eloped from psychiatric facility.

## (undated) DEVICE — TRAP SPEC POLYPR SGL CHMBR FN MESH SCRN

## (undated) DEVICE — SNARE ENDOSCP L240CM SHTH DIA24MM LOOP W10MM POLYP RND REINF

## (undated) DEVICE — ENDOSCOPIC KIT 2 12 FT OP4 DE2 GWN SYR

## (undated) DEVICE — ELECTRODE,ECG,STRESS,FOAM,3PK: Brand: MEDLINE

## (undated) DEVICE — CANNULA NSL AD TBNG L7FT PVC STR NONFLARED PRNG O2 DEL W STD